# Patient Record
Sex: FEMALE | Race: WHITE | HISPANIC OR LATINO | Employment: FULL TIME | URBAN - METROPOLITAN AREA
[De-identification: names, ages, dates, MRNs, and addresses within clinical notes are randomized per-mention and may not be internally consistent; named-entity substitution may affect disease eponyms.]

---

## 2020-07-16 ENCOUNTER — ANNUAL EXAM (OUTPATIENT)
Dept: OBGYN CLINIC | Facility: CLINIC | Age: 50
End: 2020-07-16
Payer: COMMERCIAL

## 2020-07-16 VITALS
TEMPERATURE: 97.5 F | WEIGHT: 95 LBS | SYSTOLIC BLOOD PRESSURE: 98 MMHG | DIASTOLIC BLOOD PRESSURE: 58 MMHG | HEIGHT: 61 IN | BODY MASS INDEX: 17.94 KG/M2

## 2020-07-16 DIAGNOSIS — Z01.419 ROUTINE GYNECOLOGICAL EXAMINATION: Primary | ICD-10-CM

## 2020-07-16 DIAGNOSIS — Z12.11 ENCOUNTER FOR SCREENING FOR MALIGNANT NEOPLASM OF COLON: ICD-10-CM

## 2020-07-16 DIAGNOSIS — Z11.51 SCREENING FOR HUMAN PAPILLOMAVIRUS: ICD-10-CM

## 2020-07-16 DIAGNOSIS — Z12.4 SCREENING FOR MALIGNANT NEOPLASM OF THE CERVIX: ICD-10-CM

## 2020-07-16 DIAGNOSIS — Z12.31 ENCOUNTER FOR SCREENING MAMMOGRAM FOR MALIGNANT NEOPLASM OF BREAST: ICD-10-CM

## 2020-07-16 PROCEDURE — 87624 HPV HI-RISK TYP POOLED RSLT: CPT | Performed by: OBSTETRICS & GYNECOLOGY

## 2020-07-16 PROCEDURE — G0145 SCR C/V CYTO,THINLAYER,RESCR: HCPCS | Performed by: OBSTETRICS & GYNECOLOGY

## 2020-07-16 PROCEDURE — 3008F BODY MASS INDEX DOCD: CPT | Performed by: OBSTETRICS & GYNECOLOGY

## 2020-07-16 PROCEDURE — S0610 ANNUAL GYNECOLOGICAL EXAMINA: HCPCS | Performed by: OBSTETRICS & GYNECOLOGY

## 2020-07-16 NOTE — PROGRESS NOTES
Wally Laughlin is a 48 y o  female who presents for annual well woman exam    Patient has no complain      Menstrual History:  OB History        4    Para   4    Term   4            AB        Living   4       SAB        TAB        Ectopic        Multiple        Live Births                      Patient's last menstrual period was 2020 (lmp unknown)  The following portions of the patient's history were reviewed and updated as appropriate: allergies, current medications, past family history, past medical history, past social history, past surgical history and problem list     Review of Systems  Review of Systems   Constitutional: Negative for activity change, appetite change, chills, fatigue and fever  Respiratory: Negative for cough and shortness of breath  Cardiovascular: Negative for chest pain, palpitations and leg swelling  Gastrointestinal: Negative for abdominal pain, constipation, diarrhea, nausea and vomiting  Genitourinary: Negative for difficulty urinating, dysuria, flank pain, frequency, hematuria, urgency and vaginal discharge  Neurological: Negative for dizziness and headaches  Psychiatric/Behavioral: Negative for confusion            Objective      BP 98/58   Temp 97 5 °F (36 4 °C) (Tympanic)   Ht 5' 1" (1 549 m)   Wt 43 1 kg (95 lb)   LMP 2020 (LMP Unknown)   BMI 17 95 kg/m²     Physical Exam  OBGyn Exam     General:   alert and oriented, in no acute distress, alert, appears stated age and cooperative   Heart: regular rate and rhythm, S1, S2 normal, no murmur, click, rub or gallop   Lungs: clear to auscultation bilaterally   Abdomen: soft, non-tender, without masses or organomegaly   Vulva: normal   Vagina: normal mucosa, normal discharge   Cervix: no bleeding following Pap, no cervical motion tenderness and no lesions   Uterus: normal size, mobile, non-tender   Adnexa:  Breast Exam:  normal adnexa and no mass, fullness, tenderness  breasts appear normal, no suspicious masses, no skin or nipple changes or axillary nodes  Assessment      @well woman@   49 yo female   Annual exam   Prior 4 C/S    Plan   Pap/hpv  Diet/exercise  Ca/vit D  mammo  Colonoscopy  Return to office for annual exam   All questions answered  There are no Patient Instructions on file for this visit

## 2020-07-17 ENCOUNTER — HOSPITAL ENCOUNTER (OUTPATIENT)
Dept: MAMMOGRAPHY | Facility: HOSPITAL | Age: 50
Discharge: HOME | End: 2020-07-17
Attending: OBSTETRICS & GYNECOLOGY
Payer: COMMERCIAL

## 2020-07-17 VITALS — WEIGHT: 95 LBS | HEIGHT: 61 IN | BODY MASS INDEX: 17.94 KG/M2

## 2020-07-17 DIAGNOSIS — Z12.31 ENCOUNTER FOR SCREENING MAMMOGRAM FOR MALIGNANT NEOPLASM OF BREAST: ICD-10-CM

## 2020-07-17 PROCEDURE — 77067 SCR MAMMO BI INCL CAD: CPT

## 2020-07-17 PROCEDURE — 77063 BREAST TOMOSYNTHESIS BI: CPT

## 2020-07-19 LAB
HPV HR 12 DNA CVX QL NAA+PROBE: NEGATIVE
HPV16 DNA CVX QL NAA+PROBE: NEGATIVE
HPV18 DNA CVX QL NAA+PROBE: NEGATIVE

## 2020-07-23 ENCOUNTER — OFFICE VISIT (OUTPATIENT)
Dept: SURGERY | Facility: CLINIC | Age: 50
End: 2020-07-23
Payer: COMMERCIAL

## 2020-07-23 ENCOUNTER — TELEPHONE (OUTPATIENT)
Dept: SURGERY | Facility: CLINIC | Age: 50
End: 2020-07-23

## 2020-07-23 VITALS
WEIGHT: 94 LBS | HEART RATE: 77 BPM | SYSTOLIC BLOOD PRESSURE: 100 MMHG | BODY MASS INDEX: 17.76 KG/M2 | TEMPERATURE: 97 F | DIASTOLIC BLOOD PRESSURE: 62 MMHG

## 2020-07-23 DIAGNOSIS — Z12.11 SCREENING FOR MALIGNANT NEOPLASM OF COLON: Primary | ICD-10-CM

## 2020-07-23 PROCEDURE — 99242 OFF/OP CONSLTJ NEW/EST SF 20: CPT | Performed by: SURGERY

## 2020-07-23 NOTE — PROGRESS NOTES
Assessment/Plan:  The patient is due for a screening colonoscopy  She will be scheduled for the same  She wants this to be done at the [facility]  I gave her prescription for GoLYTELY  I explained the procedure to her and she verbalizes understanding  Diagnoses and all orders for this visit:    Screening for malignant neoplasm of colon          Subjective:      Patient ID: Ishmael Norton is a 48 y o  female  54-year-old female patient came to my office because she is due for a screening colonoscopy  She has no complaints of abdominal pain, constipation, change of bowel habits  No complaints of blood in stools or melenic stools  She has no significant history of colon cancer in her family  The following portions of the patient's history were reviewed and updated as appropriate: allergies, current medications, past medical history, past social history, past surgical history and problem list     Review of Systems   Constitutional: Negative  HENT: Negative  Eyes: Negative  Respiratory: Negative  Cardiovascular: Negative  Gastrointestinal: Negative  Endocrine: Negative  Genitourinary: Negative  Musculoskeletal: Negative  Skin: Negative  Allergic/Immunologic: Negative  Neurological: Negative  Hematological: Negative  Psychiatric/Behavioral: Negative  Objective:      /62 (BP Location: Left arm, Patient Position: Sitting, Cuff Size: Adult)   Pulse 77   Temp (!) 97 °F (36 1 °C) (Tympanic)   Wt 42 6 kg (94 lb)   LMP 07/16/2020 (LMP Unknown) Comment: irregular  BMI 17 76 kg/m²          Physical Exam   Constitutional: She is oriented to person, place, and time  She appears well-developed and well-nourished  HENT:   Head: Normocephalic and atraumatic  Eyes: Pupils are equal, round, and reactive to light  Conjunctivae are normal    Neck: Normal range of motion  Neck supple     Cardiovascular: Normal rate, regular rhythm and normal heart sounds  Pulmonary/Chest: Effort normal and breath sounds normal    Abdominal: Soft  Bowel sounds are normal    Neurological: She is alert and oriented to person, place, and time  Skin: Skin is warm  Psychiatric: She has a normal mood and affect   Her behavior is normal  Judgment and thought content normal

## 2020-07-24 ENCOUNTER — OFFICE VISIT (OUTPATIENT)
Dept: FAMILY MEDICINE CLINIC | Facility: CLINIC | Age: 50
End: 2020-07-24
Payer: COMMERCIAL

## 2020-07-24 VITALS
TEMPERATURE: 97.9 F | BODY MASS INDEX: 18.24 KG/M2 | HEIGHT: 61 IN | OXYGEN SATURATION: 100 % | SYSTOLIC BLOOD PRESSURE: 90 MMHG | RESPIRATION RATE: 16 BRPM | DIASTOLIC BLOOD PRESSURE: 60 MMHG | HEART RATE: 78 BPM | WEIGHT: 96.6 LBS

## 2020-07-24 DIAGNOSIS — Z00.00 ANNUAL PHYSICAL EXAM: Primary | ICD-10-CM

## 2020-07-24 LAB
LAB AP GYN PRIMARY INTERPRETATION: NORMAL
LAB AP LMP: NORMAL
Lab: NORMAL

## 2020-07-24 PROCEDURE — 99396 PREV VISIT EST AGE 40-64: CPT | Performed by: FAMILY MEDICINE

## 2020-07-24 PROCEDURE — 3008F BODY MASS INDEX DOCD: CPT | Performed by: FAMILY MEDICINE

## 2020-07-24 NOTE — PROGRESS NOTES
Assessment/Plan:         Problem List Items Addressed This Visit        Other    Annual physical exam - Primary     Check labs had mammo gyn exam and scheduled for colonosocpy         Relevant Orders    CBC and differential    Comprehensive metabolic panel    Lipid panel    Urinalysis with microscopic            Subjective:  Pt here or physical   Patient ID: Joselin Arthur is a 48 y o  female  HPI    The following portions of the patient's history were reviewed and updated as appropriate:   She has a past medical history of Abnormal Pap smear of cervix, Anxiety and depression, Breast lump, and Migraine  ,  does not have any pertinent problems on file  ,   has a past surgical history that includes Breast lumpectomy (Right);  section; Breast excisional biopsy (Right, ); Mammo (historical) (2018); Dilation and curettage, diagnostic / therapeutic (); Multiple tooth extractions; and Tubal ligation ()  ,  family history includes Aneurysm in her mother; Hypertension in her mother; No Known Problems in her brother, daughter, sister, son, son, and son; Stroke in her brother  ,   reports that she has been smoking cigarettes  She has a 33 00 pack-year smoking history  She has never used smokeless tobacco  She reports that she drank alcohol  She reports that she does not use drugs  ,  has No Known Allergies     Current Outpatient Medications   Medication Sig Dispense Refill    ibuprofen (MOTRIN) 600 mg tablet Take by mouth every 6 (six) hours as needed for mild pain      polyethylene glycol (Golytely) 4000 mL solution Take 4,000 mL by mouth once for 1 dose 4000 mL 0     No current facility-administered medications for this visit  Review of Systems   Constitutional: Negative for appetite change, chills, fatigue and fever  Respiratory: Negative for cough, chest tightness and shortness of breath  Cardiovascular: Negative for chest pain, palpitations and leg swelling     Gastrointestinal: Negative for abdominal pain, constipation, diarrhea, nausea and vomiting  Genitourinary: Negative for difficulty urinating and frequency  Musculoskeletal: Negative for arthralgias, back pain and neck pain  Skin: Negative for rash  Neurological: Negative for dizziness, weakness, light-headedness, numbness and headaches  Hematological: Does not bruise/bleed easily  Psychiatric/Behavioral: Negative for dysphoric mood and sleep disturbance  The patient is not nervous/anxious  Objective:  Vitals:    07/24/20 0854   BP: 90/60   BP Location: Right arm   Patient Position: Sitting   Cuff Size: Adult   Pulse: 78   Resp: 16   Temp: 97 9 °F (36 6 °C)   SpO2: 100%   Weight: 43 8 kg (96 lb 9 6 oz)   Height: 5' 1" (1 549 m)     Body mass index is 18 25 kg/m²  Physical Exam   Constitutional: She is oriented to person, place, and time  She appears well-developed  No distress  HENT:   Head: Normocephalic  Right Ear: Tympanic membrane, external ear and ear canal normal    Left Ear: Tympanic membrane, external ear and ear canal normal    Nose: Nose normal    Mouth/Throat: Oropharynx is clear and moist  No oropharyngeal exudate  Eyes: Pupils are equal, round, and reactive to light  Conjunctivae, EOM and lids are normal    Neck: Normal range of motion  Neck supple  Carotid bruit is not present  No thyromegaly present  Cardiovascular: Normal rate, regular rhythm, normal heart sounds, intact distal pulses and normal pulses  Exam reveals no friction rub  No murmur heard  Pulmonary/Chest: Effort normal and breath sounds normal  No stridor  No respiratory distress  She has no wheezes  She has no rhonchi  She has no rales  Abdominal: Soft  Normal appearance and bowel sounds are normal  She exhibits no distension and no mass  There is no hepatosplenomegaly  There is no tenderness  There is no guarding  No hernia  Musculoskeletal: Normal range of motion  She exhibits no edema     Lymphadenopathy:     She has no cervical adenopathy  She has no axillary adenopathy  Right: No inguinal adenopathy present  Left: No inguinal adenopathy present  Neurological: She is alert and oriented to person, place, and time  She has normal strength  She displays no tremor and normal reflexes  No cranial nerve deficit or sensory deficit  She exhibits normal muscle tone  Coordination and gait normal    Skin: Skin is warm, dry and intact  No abnormal appearing moles   Psychiatric: She has a normal mood and affect  Her speech is normal and behavior is normal  Judgment and thought content normal    Vitals reviewed

## 2020-07-25 ENCOUNTER — APPOINTMENT (OUTPATIENT)
Dept: LAB | Facility: HOSPITAL | Age: 50
End: 2020-07-25
Payer: COMMERCIAL

## 2020-07-25 DIAGNOSIS — Z00.00 ANNUAL PHYSICAL EXAM: ICD-10-CM

## 2020-07-25 LAB
ALBUMIN SERPL BCP-MCNC: 3.8 G/DL (ref 3.5–5)
ALP SERPL-CCNC: 61 U/L (ref 46–116)
ALT SERPL W P-5'-P-CCNC: 19 U/L (ref 12–78)
ANION GAP SERPL CALCULATED.3IONS-SCNC: 4 MMOL/L (ref 4–13)
AST SERPL W P-5'-P-CCNC: 11 U/L (ref 5–45)
BACTERIA UR QL AUTO: ABNORMAL /HPF
BASOPHILS # BLD AUTO: 0.03 THOUSANDS/ΜL (ref 0–0.1)
BASOPHILS NFR BLD AUTO: 0 % (ref 0–1)
BILIRUB SERPL-MCNC: 0.67 MG/DL (ref 0.2–1)
BILIRUB UR QL STRIP: NEGATIVE
BUN SERPL-MCNC: 11 MG/DL (ref 5–25)
CALCIUM SERPL-MCNC: 9 MG/DL (ref 8.3–10.1)
CHLORIDE SERPL-SCNC: 108 MMOL/L (ref 100–108)
CHOLEST SERPL-MCNC: 207 MG/DL (ref 50–200)
CLARITY UR: ABNORMAL
CO2 SERPL-SCNC: 28 MMOL/L (ref 21–32)
COLOR UR: YELLOW
CREAT SERPL-MCNC: 0.72 MG/DL (ref 0.6–1.3)
EOSINOPHIL # BLD AUTO: 0.06 THOUSAND/ΜL (ref 0–0.61)
EOSINOPHIL NFR BLD AUTO: 1 % (ref 0–6)
ERYTHROCYTE [DISTWIDTH] IN BLOOD BY AUTOMATED COUNT: 14.6 % (ref 11.6–15.1)
GFR SERPL CREATININE-BSD FRML MDRD: 98 ML/MIN/1.73SQ M
GLUCOSE P FAST SERPL-MCNC: 83 MG/DL (ref 65–99)
GLUCOSE UR STRIP-MCNC: NEGATIVE MG/DL
HCT VFR BLD AUTO: 38.2 % (ref 34.8–46.1)
HDLC SERPL-MCNC: 55 MG/DL
HGB BLD-MCNC: 12.1 G/DL (ref 11.5–15.4)
HGB UR QL STRIP.AUTO: NEGATIVE
HYALINE CASTS #/AREA URNS LPF: ABNORMAL /LPF
IMM GRANULOCYTES # BLD AUTO: 0.03 THOUSAND/UL (ref 0–0.2)
IMM GRANULOCYTES NFR BLD AUTO: 0 % (ref 0–2)
KETONES UR STRIP-MCNC: NEGATIVE MG/DL
LDLC SERPL CALC-MCNC: 131 MG/DL (ref 0–100)
LEUKOCYTE ESTERASE UR QL STRIP: NEGATIVE
LYMPHOCYTES # BLD AUTO: 2.26 THOUSANDS/ΜL (ref 0.6–4.47)
LYMPHOCYTES NFR BLD AUTO: 31 % (ref 14–44)
MCH RBC QN AUTO: 29.2 PG (ref 26.8–34.3)
MCHC RBC AUTO-ENTMCNC: 31.7 G/DL (ref 31.4–37.4)
MCV RBC AUTO: 92 FL (ref 82–98)
MONOCYTES # BLD AUTO: 0.44 THOUSAND/ΜL (ref 0.17–1.22)
MONOCYTES NFR BLD AUTO: 6 % (ref 4–12)
NEUTROPHILS # BLD AUTO: 4.53 THOUSANDS/ΜL (ref 1.85–7.62)
NEUTS SEG NFR BLD AUTO: 62 % (ref 43–75)
NITRITE UR QL STRIP: POSITIVE
NON-SQ EPI CELLS URNS QL MICRO: ABNORMAL /HPF
NONHDLC SERPL-MCNC: 152 MG/DL
NRBC BLD AUTO-RTO: 0 /100 WBCS
PH UR STRIP.AUTO: 8 [PH]
PLATELET # BLD AUTO: 296 THOUSANDS/UL (ref 149–390)
PMV BLD AUTO: 9.6 FL (ref 8.9–12.7)
POTASSIUM SERPL-SCNC: 4.7 MMOL/L (ref 3.5–5.3)
PROT SERPL-MCNC: 7.4 G/DL (ref 6.4–8.2)
PROT UR STRIP-MCNC: NEGATIVE MG/DL
RBC # BLD AUTO: 4.14 MILLION/UL (ref 3.81–5.12)
RBC #/AREA URNS AUTO: ABNORMAL /HPF
SODIUM SERPL-SCNC: 140 MMOL/L (ref 136–145)
SP GR UR STRIP.AUTO: 1.01 (ref 1–1.03)
TRIGL SERPL-MCNC: 105 MG/DL
UROBILINOGEN UR QL STRIP.AUTO: 0.2 E.U./DL
WBC # BLD AUTO: 7.35 THOUSAND/UL (ref 4.31–10.16)
WBC #/AREA URNS AUTO: ABNORMAL /HPF

## 2020-07-25 PROCEDURE — 85025 COMPLETE CBC W/AUTO DIFF WBC: CPT

## 2020-07-25 PROCEDURE — 36415 COLL VENOUS BLD VENIPUNCTURE: CPT

## 2020-07-25 PROCEDURE — 80053 COMPREHEN METABOLIC PANEL: CPT

## 2020-07-25 PROCEDURE — 80061 LIPID PANEL: CPT

## 2020-07-25 PROCEDURE — 81001 URINALYSIS AUTO W/SCOPE: CPT | Performed by: FAMILY MEDICINE

## 2020-07-27 ENCOUNTER — PREP FOR PROCEDURE (OUTPATIENT)
Dept: SURGERY | Facility: CLINIC | Age: 50
End: 2020-07-27

## 2020-07-27 DIAGNOSIS — Z12.11 SCREENING FOR MALIGNANT NEOPLASM OF COLON: Primary | ICD-10-CM

## 2020-07-29 ENCOUNTER — TELEPHONE (OUTPATIENT)
Dept: FAMILY MEDICINE CLINIC | Facility: CLINIC | Age: 50
End: 2020-07-29

## 2020-07-29 DIAGNOSIS — E78.5 HYPERLIPIDEMIA, UNSPECIFIED HYPERLIPIDEMIA TYPE: Primary | ICD-10-CM

## 2020-08-04 ENCOUNTER — HOSPITAL ENCOUNTER (OUTPATIENT)
Dept: GASTROENTEROLOGY | Facility: HOSPITAL | Age: 50
Setting detail: OUTPATIENT SURGERY
Discharge: HOME | End: 2020-08-04
Attending: SURGERY | Admitting: SURGERY
Payer: COMMERCIAL

## 2020-08-04 ENCOUNTER — ANESTHESIA (OUTPATIENT)
Dept: GASTROENTEROLOGY | Facility: HOSPITAL | Age: 50
End: 2020-08-04

## 2020-08-04 ENCOUNTER — ANESTHESIA EVENT (OUTPATIENT)
Dept: GASTROENTEROLOGY | Facility: HOSPITAL | Age: 50
End: 2020-08-04

## 2020-08-04 VITALS
HEIGHT: 61 IN | RESPIRATION RATE: 16 BRPM | SYSTOLIC BLOOD PRESSURE: 114 MMHG | OXYGEN SATURATION: 100 % | TEMPERATURE: 97.1 F | HEART RATE: 74 BPM | DIASTOLIC BLOOD PRESSURE: 68 MMHG | BODY MASS INDEX: 18.12 KG/M2 | WEIGHT: 96 LBS

## 2020-08-04 DIAGNOSIS — Z12.11 SCREENING FOR MALIGNANT NEOPLASM OF COLON: ICD-10-CM

## 2020-08-04 PROBLEM — IMO0001 SMOKING: Status: ACTIVE | Noted: 2020-08-04

## 2020-08-04 PROBLEM — F41.9 ANXIETY: Status: ACTIVE | Noted: 2020-08-04

## 2020-08-04 PROBLEM — F32.A DEPRESSION: Status: ACTIVE | Noted: 2020-08-04

## 2020-08-04 PROBLEM — F17.200 SMOKING: Status: ACTIVE | Noted: 2020-08-04

## 2020-08-04 PROCEDURE — 45380 COLONOSCOPY AND BIOPSY: CPT | Performed by: SURGERY

## 2020-08-04 PROCEDURE — 88305 TISSUE EXAM BY PATHOLOGIST: CPT | Performed by: PATHOLOGY

## 2020-08-04 RX ORDER — PROPOFOL 10 MG/ML
INJECTION, EMULSION INTRAVENOUS AS NEEDED
Status: DISCONTINUED | OUTPATIENT
Start: 2020-08-04 | End: 2020-08-04

## 2020-08-04 RX ORDER — PROPOFOL 10 MG/ML
INJECTION, EMULSION INTRAVENOUS CONTINUOUS PRN
Status: DISCONTINUED | OUTPATIENT
Start: 2020-08-04 | End: 2020-08-04

## 2020-08-04 RX ORDER — ACETAMINOPHEN 325 MG/1
650 TABLET ORAL ONCE AS NEEDED
Status: COMPLETED | OUTPATIENT
Start: 2020-08-04 | End: 2020-08-04

## 2020-08-04 RX ORDER — LIDOCAINE HYDROCHLORIDE 10 MG/ML
INJECTION, SOLUTION EPIDURAL; INFILTRATION; INTRACAUDAL; PERINEURAL AS NEEDED
Status: DISCONTINUED | OUTPATIENT
Start: 2020-08-04 | End: 2020-08-04

## 2020-08-04 RX ORDER — SODIUM CHLORIDE 9 MG/ML
INJECTION, SOLUTION INTRAVENOUS CONTINUOUS PRN
Status: DISCONTINUED | OUTPATIENT
Start: 2020-08-04 | End: 2020-08-04

## 2020-08-04 RX ADMIN — PROPOFOL 140 MCG/KG/MIN: 10 INJECTION, EMULSION INTRAVENOUS at 09:29

## 2020-08-04 RX ADMIN — PROPOFOL 30 MG: 10 INJECTION, EMULSION INTRAVENOUS at 09:36

## 2020-08-04 RX ADMIN — PROPOFOL 50 MG: 10 INJECTION, EMULSION INTRAVENOUS at 09:29

## 2020-08-04 RX ADMIN — LIDOCAINE HYDROCHLORIDE 50 MG: 10 INJECTION, SOLUTION EPIDURAL; INFILTRATION; INTRACAUDAL; PERINEURAL at 09:29

## 2020-08-04 RX ADMIN — ACETAMINOPHEN 650 MG: 325 TABLET ORAL at 10:43

## 2020-08-04 RX ADMIN — SODIUM CHLORIDE: 9 INJECTION, SOLUTION INTRAVENOUS at 09:25

## 2020-08-04 RX ADMIN — PROPOFOL 20 MG: 10 INJECTION, EMULSION INTRAVENOUS at 09:31

## 2020-08-04 NOTE — ANESTHESIA PREPROCEDURE EVALUATION
Procedure:  COLONOSCOPY    Relevant Problems   NEURO/PSYCH   (+) Anxiety   (+) Depression      PULMONARY   (+) Smoking        Physical Exam    Airway    Mallampati score: II  TM Distance: >3 FB  Neck ROM: full     Dental   lower dentures and upper dentures,     Cardiovascular  Rhythm: regular, Rate: normal, Cardiovascular exam normal    Pulmonary  Pulmonary exam normal     Other Findings        Anesthesia Plan  ASA Score- 2     Anesthesia Type- IV sedation with anesthesia with ASA Monitors  Additional Monitors:   Airway Plan:           Plan Factors-Exercise tolerance (METS): >4 METS  Chart reviewed  Patient summary reviewed  Patient is a current smoker  Patient smoked on day of surgery  Induction- intravenous  Postoperative Plan-     Informed Consent- Anesthetic plan and risks discussed with patient  I personally reviewed this patient with the CRNA  Discussed and agreed on the Anesthesia Plan with the CRNA  Ludy Guerrero

## 2020-08-04 NOTE — ANESTHESIA POSTPROCEDURE EVALUATION
Post-Op Assessment Note    CV Status:  Stable  Pain Score: 0    Pain management: adequate     Mental Status:  Alert, sleepy and arousable   Hydration Status:  Euvolemic and stable   PONV Controlled:  Controlled   Airway Patency:  Patent      Post Op Vitals Reviewed: Yes      Staff: Anesthesiologist, CRNA         No complications documented      /61 (08/04/20 1011)    Temp     Pulse 78 (08/04/20 1011)   Resp 20 (08/04/20 1011)    SpO2 100 % (08/04/20 1011)

## 2020-08-11 ENCOUNTER — TELEPHONE (OUTPATIENT)
Dept: MAMMOGRAPHY | Facility: CLINIC | Age: 50
End: 2020-08-11

## 2020-08-17 ENCOUNTER — TELEPHONE (OUTPATIENT)
Dept: SURGERY | Facility: CLINIC | Age: 50
End: 2020-08-17

## 2020-08-17 NOTE — TELEPHONE ENCOUNTER
Left a message for the patient to call back   Dr Mercedez Munguia said he removed a polyp during the colonoscopy and that she will need to repeat a colonoscopy in 2 years

## 2020-08-20 ENCOUNTER — HOSPITAL ENCOUNTER (OUTPATIENT)
Dept: MAMMOGRAPHY | Facility: CLINIC | Age: 50
Discharge: HOME | End: 2020-08-20
Payer: COMMERCIAL

## 2020-08-20 VITALS — HEIGHT: 61 IN | WEIGHT: 96 LBS | BODY MASS INDEX: 18.12 KG/M2 | TEMPERATURE: 97.1 F

## 2020-08-20 DIAGNOSIS — R92.8 ABNORMAL MAMMOGRAM: ICD-10-CM

## 2020-08-20 PROCEDURE — 77065 DX MAMMO INCL CAD UNI: CPT

## 2020-08-21 NOTE — RESULT ENCOUNTER NOTE
Please notify patient  Dense breast with calcification   recommend patient to be seen by breast surgeon for evlalution as well Declined

## 2020-08-27 ENCOUNTER — TELEPHONE (OUTPATIENT)
Dept: MAMMOGRAPHY | Facility: CLINIC | Age: 50
End: 2020-08-27

## 2020-10-13 DIAGNOSIS — R92.8 ABNORMAL FINDING ON MAMMOGRAPHY: Primary | ICD-10-CM

## 2020-10-27 ENCOUNTER — TELEPHONE (OUTPATIENT)
Dept: OBGYN CLINIC | Facility: CLINIC | Age: 50
End: 2020-10-27

## 2020-10-28 DIAGNOSIS — N39.0 URINARY TRACT INFECTION WITHOUT HEMATURIA, SITE UNSPECIFIED: Primary | ICD-10-CM

## 2020-10-28 RX ORDER — NITROFURANTOIN 25; 75 MG/1; MG/1
100 CAPSULE ORAL 2 TIMES DAILY
Qty: 14 CAPSULE | Refills: 0 | Status: SHIPPED | OUTPATIENT
Start: 2020-10-28 | End: 2020-11-04

## 2021-03-18 ENCOUNTER — HOSPITAL ENCOUNTER (OUTPATIENT)
Dept: MAMMOGRAPHY | Facility: CLINIC | Age: 51
Discharge: HOME | End: 2021-03-18
Payer: COMMERCIAL

## 2021-03-18 VITALS — BODY MASS INDEX: 18.12 KG/M2 | WEIGHT: 96 LBS | HEIGHT: 61 IN

## 2021-03-18 DIAGNOSIS — R92.1 BREAST CALCIFICATIONS: ICD-10-CM

## 2021-03-18 DIAGNOSIS — R92.8 ABNORMAL FINDING ON MAMMOGRAPHY: ICD-10-CM

## 2021-03-18 PROCEDURE — 77065 DX MAMMO INCL CAD UNI: CPT

## 2021-03-24 DIAGNOSIS — R92.8 ABNORMAL MAMMOGRAM: Primary | ICD-10-CM

## 2021-03-31 ENCOUNTER — OFFICE VISIT (OUTPATIENT)
Dept: INTERNAL MEDICINE CLINIC | Facility: CLINIC | Age: 51
End: 2021-03-31

## 2021-03-31 VITALS
HEIGHT: 62 IN | HEART RATE: 83 BPM | DIASTOLIC BLOOD PRESSURE: 68 MMHG | SYSTOLIC BLOOD PRESSURE: 115 MMHG | TEMPERATURE: 97.3 F | WEIGHT: 93.8 LBS | BODY MASS INDEX: 17.26 KG/M2 | OXYGEN SATURATION: 96 %

## 2021-03-31 DIAGNOSIS — Z12.2 ENCOUNTER FOR SCREENING FOR LUNG CANCER: ICD-10-CM

## 2021-03-31 DIAGNOSIS — F17.219 CIGARETTE NICOTINE DEPENDENCE WITH NICOTINE-INDUCED DISORDER: ICD-10-CM

## 2021-03-31 DIAGNOSIS — Z13.31 DEPRESSION SCREENING: Primary | ICD-10-CM

## 2021-03-31 DIAGNOSIS — N39.0 URINARY TRACT INFECTION WITHOUT HEMATURIA, SITE UNSPECIFIED: ICD-10-CM

## 2021-03-31 DIAGNOSIS — Z00.00 ENCOUNTER FOR MEDICAL EXAMINATION TO ESTABLISH CARE: ICD-10-CM

## 2021-03-31 DIAGNOSIS — R63.4 WEIGHT LOSS: ICD-10-CM

## 2021-03-31 DIAGNOSIS — R92.8 ABNORMAL MAMMOGRAM OF RIGHT BREAST: ICD-10-CM

## 2021-03-31 DIAGNOSIS — Z00.00 HEALTHCARE MAINTENANCE: ICD-10-CM

## 2021-03-31 DIAGNOSIS — Z11.4 SCREENING FOR HIV (HUMAN IMMUNODEFICIENCY VIRUS): ICD-10-CM

## 2021-03-31 PROBLEM — D12.6 TUBULAR ADENOMA OF COLON: Status: ACTIVE | Noted: 2021-03-31

## 2021-03-31 PROCEDURE — 99244 OFF/OP CNSLTJ NEW/EST MOD 40: CPT | Performed by: INTERNAL MEDICINE

## 2021-03-31 PROCEDURE — 3725F SCREEN DEPRESSION PERFORMED: CPT | Performed by: INTERNAL MEDICINE

## 2021-03-31 PROCEDURE — 3008F BODY MASS INDEX DOCD: CPT | Performed by: INTERNAL MEDICINE

## 2021-03-31 RX ORDER — POLYETHYLENE GLYCOL 3350 17 G
2 POWDER IN PACKET (EA) ORAL AS NEEDED
Qty: 100 EACH | Refills: 1 | Status: SHIPPED | OUTPATIENT
Start: 2021-03-31 | End: 2021-04-16

## 2021-03-31 RX ORDER — SULFAMETHOXAZOLE AND TRIMETHOPRIM 800; 160 MG/1; MG/1
1 TABLET ORAL 2 TIMES DAILY
Qty: 6 TABLET | Refills: 0 | Status: SHIPPED | OUTPATIENT
Start: 2021-03-31 | End: 2021-04-03

## 2021-03-31 RX ORDER — BUPROPION HYDROCHLORIDE 150 MG/1
TABLET ORAL
Qty: 30 TABLET | Refills: 5 | Status: SHIPPED | OUTPATIENT
Start: 2021-03-31 | End: 2021-04-16

## 2021-03-31 NOTE — PROGRESS NOTES
INTERNAL MEDICINE OFFICE VISIT  [facility]  [address] [facility]    NAME: Cass Fulton  AGE: 46 y o  SEX: female    DATE OF ENCOUNTER: 3/31/2021    Assessment and Plan     1  Depression screening  PHQ-9 today is 7 ; patient denies any suicidal thoughts/plans, notes feeling good overall and decline need for antidepressant medication / psych therapy which was offered and explained to her  To follow up and readdress next visit  2  Encounter for medical examination to establish care  Physical exam completed - see below  3  Healthcare maintenance  Colonoscopy done 08/04/2020 remarkable for Tubular Adenoma Sigmoid Polyp, due in 5-10 years  HPV and PAP smear 07/16/2020 normal   Received information on COVID vaccine signup     4  Screening for HIV (human immunodeficiency virus)  - HIV 1/2 Antigen/Antibody (4th Generation) w Reflex [facility]; Future    5  Encounter for screening for lung cancer  Patient has unintentional weight loss about 16 lbs in past two years and reports two months night sweating; menopause symptoms are possible given noted irregular menses but need to rule out lung cancer given smoking hx, weight loss and night sweats    - CT lung low dose wo contrast ordered; patient to call and schedule     6  Urinary tract infection without hematuria, site unspecified  2 weeks foul smelling urine and right flank discomfort / mild pain noted; positive hx of UTIs  - sulfamethoxazole-trimethoprim (BACTRIM DS) 800-160 mg per tablet; Take 1 tablet by mouth 2 (two) times a day for 3 days  Dispense: 6 tablet; Refill: 0  - Patient to call and schedule earlier f/u appointment if no improvement     7  Abnormal mammogram of right breast  Right lower breast calcifications noted on recent mammogram 03/18/2021   - Mammogram ordered and to be scheduled in September 2021     8   Cigarette nicotine dependence with nicotine-induced disorder  33 pack year hx of smoking; current 1 pack / day, willing to smoke , notes trying Chantix in the past with side effect of nausea and vomiting; and tried Nicotine patches & gums before as well on another time trying to quit but was unsuccessful      - buPROPion (WELLBUTRIN XL) 150 mg 24 hr tablet; Take one tablet by mouth daily for the first three days followed by twice a day afterward  Dispense: 30 tablet; Refill: 5  - nicotine polacrilex (COMMIT) 2 MG lozenge; Apply 1 lozenge (2 mg total) to the mouth or throat as needed for smoking cessation  Dispense: 100 each; Refill: 1    9  Weight loss  Unintentional weight loss 16 lbs in 2 years, no change in diet or exercise, up to date for cervical cancer and breast cancer screening; had colorectal cancer screening 08/04/2020, due in 5-10 years  - CT lung low dose ordered   - TSH, 3rd generation with Free T4 reflex; Future  - HIV 1/2 Antigen/Antibody (4th Generation) w Reflex [facility]; Future    Orders Placed This Encounter   Procedures    HIV 1/2 Antigen/Antibody (4th Generation) w Reflex [facility]    TSH, 3rd generation with Free T4 reflex       Chief Complaint     Chief Complaint   Patient presents with   [facility] Patient Visit     Establish care       History of Present Illness     HPI   Kaley Matamoros is a 45 yo female w PMH remarkable for Nicotine dependence, Migraine, Breast lump s/p lumpectomy 2014, Anxiety and Depression, hx abnormal pap smear who presents today to establish care mainly and with concerns of unintended weight loss and foul smelling urine       Patient notes unintentional weight loss, no major change in diet or exercise, lost about 16 lbs in 2 years, noting poor dentition for which she went to the dentist and working on having dental fillings / treatments but also noting "eating well" as usual  The concern of need to rule out cancer and work up for the etiology and management of her unintentional weight loss explained to the patient as above, including CT lung and repeating mammogram in 6 months  Patient express interest in quitting smoking, failed quitting trials in the past, the above assessment and plan discussed with her in details and to follow up in 5 weeks for further assessment and management  Patient notes 2 weeks of foul smelling urine and right flank discomfort with positive hx of UTI past year; agree for empirical treatment with Bactrim and to call and update if no improvement for further management  The following portions of the patient's history were reviewed and updated as appropriate: allergies, current medications, past family history, past medical history, past social history, past surgical history and problem list     Review of Systems     Review of Systems   Constitutional: Positive for diaphoresis and unexpected weight change  Negative for appetite change, chills and fatigue  HENT: Positive for dental problem  Negative for mouth sores, rhinorrhea, sore throat and trouble swallowing  Eyes: Negative for visual disturbance  Wear prescription glasses    Respiratory: Negative for cough, chest tightness, shortness of breath and wheezing  Cardiovascular: Negative for chest pain, palpitations and leg swelling  Gastrointestinal: Negative for abdominal pain, blood in stool, diarrhea and nausea  Endocrine: Negative for cold intolerance, heat intolerance, polydipsia, polyphagia and polyuria  Genitourinary: Positive for dysuria and flank pain  Negative for difficulty urinating, dyspareunia and hematuria  Neurological: Positive for headaches  Negative for dizziness, seizures, weakness and numbness  Hx of migraine   Psychiatric/Behavioral: Negative for behavioral problems, dysphoric mood, hallucinations, self-injury and suicidal ideas  The patient is not nervous/anxious           Depression hx ; but denies suicidal / ideation thoughts        Active Problem List     Patient Active Problem List   Diagnosis    Annual physical exam    Smoking    Anxiety  Depression    Tubular adenoma of colon       Objective     /68 (BP Location: Left arm, Patient Position: Sitting, Cuff Size: Standard)   Pulse 83   Temp (!) 97 3 °F (36 3 °C) (Temporal)   Ht 5' 1 5" (1 562 m)   Wt 42 5 kg (93 lb 12 8 oz)   SpO2 96%   BMI 17 44 kg/m²     Physical Exam  Constitutional:       General: She is not in acute distress  Comments: Cachectic , underweight    HENT:      Head: Normocephalic and atraumatic  Nose: Nose normal       Mouth/Throat:      Mouth: Mucous membranes are moist    Eyes:      General: No scleral icterus  Extraocular Movements: Extraocular movements intact  Pupils: Pupils are equal, round, and reactive to light  Cardiovascular:      Rate and Rhythm: Normal rate and regular rhythm  Pulses: Normal pulses  Heart sounds: Normal heart sounds  No murmur  No gallop  Pulmonary:      Effort: Pulmonary effort is normal  No respiratory distress  Breath sounds: No wheezing  Abdominal:      General: Abdomen is flat  Bowel sounds are normal       Palpations: Abdomen is soft  Musculoskeletal:      Right lower leg: No edema  Left lower leg: No edema  Skin:     Capillary Refill: Capillary refill takes less than 2 seconds  Coloration: Skin is not jaundiced  Neurological:      General: No focal deficit present  Mental Status: She is alert and oriented to person, place, and time  Cranial Nerves: No cranial nerve deficit  Sensory: No sensory deficit  Motor: No weakness  Psychiatric:         Mood and Affect: Mood normal          Behavior: Behavior normal          Thought Content:  Thought content normal          Judgment: Judgment normal          Pertinent Laboratory/Diagnostic Studies:  CBC:   Lab Results   Component Value Date/Time    WBC 7 35 07/25/2020 09:09 AM    RBC 4 14 07/25/2020 09:09 AM    HGB 12 1 07/25/2020 09:09 AM    HGB 12 2 01/16/2015 12:45 AM    HCT 38 2 07/25/2020 09:09 AM    MCV 92 07/25/2020 09:09 AM    MCH 29 2 07/25/2020 09:09 AM    MCHC 31 7 07/25/2020 09:09 AM    RDW 14 6 07/25/2020 09:09 AM    MPV 9 6 07/25/2020 09:09 AM     07/25/2020 09:09 AM    NRBC 0 07/25/2020 09:09 AM    NEUTOPHILPCT 62 07/25/2020 09:09 AM    LYMPHOPCT 31 07/25/2020 09:09 AM    MONOPCT 6 07/25/2020 09:09 AM    EOSPCT 1 07/25/2020 09:09 AM    BASOPCT 0 07/25/2020 09:09 AM    NEUTROABS 4 53 07/25/2020 09:09 AM    LYMPHSABS 2 26 07/25/2020 09:09 AM    MONOSABS 0 44 07/25/2020 09:09 AM    EOSABS 0 06 07/25/2020 09:09 AM     Chemistry Profile:   Lab Results   Component Value Date/Time    K 4 7 07/25/2020 09:09 AM     07/25/2020 09:09 AM    CO2 28 07/25/2020 09:09 AM    ANIONGAP 19 01/16/2015 12:45 AM    BUN 11 07/25/2020 09:09 AM    CREATININE 0 72 07/25/2020 09:09 AM    GLUF 83 07/25/2020 09:09 AM    CALCIUM 9 0 07/25/2020 09:09 AM    AST 11 07/25/2020 09:09 AM    ALT 19 07/25/2020 09:09 AM    ALKPHOS 61 07/25/2020 09:09 AM    EGFR 98 07/25/2020 09:09 AM     Endocrine Studies:   Lab Results   Component Value Date/Time    TRIG 105 07/25/2020 09:09 AM    CHOLESTEROL 207 (H) 07/25/2020 09:09 AM    HDL 55 07/25/2020 09:09 AM    LDLCALC 131 (H) 07/25/2020 09:09 AM    NONHDLC 152 07/25/2020 09:09 AM     Health Maintenance: No results found for: PSA, HEPCAB    Current Medications     Current Outpatient Medications:     ibuprofen (MOTRIN) 600 mg tablet, Take by mouth every 6 (six) hours as needed for mild pain, Disp: , Rfl:     buPROPion (WELLBUTRIN XL) 150 mg 24 hr tablet, Take one tablet by mouth daily for the first three days followed by twice a day afterward, Disp: 30 tablet, Rfl: 5    nicotine polacrilex (COMMIT) 2 MG lozenge, Apply 1 lozenge (2 mg total) to the mouth or throat as needed for smoking cessation, Disp: 100 each, Rfl: 1    polyethylene glycol (Golytely) 4000 mL solution, Take 4,000 mL by mouth once for 1 dose (Patient not taking: Reported on 3/31/2021), Disp: 4000 mL, Rfl: 0   sulfamethoxazole-trimethoprim (BACTRIM DS) 800-160 mg per tablet, Take 1 tablet by mouth 2 (two) times a day for 3 days, Disp: 6 tablet, Rfl: 0    Health Maintenance     Health Maintenance   Topic Date Due    Depression Remission PHQ  Never done    HIV Screening  Never done    COVID-19 Vaccine (1) Never done    BMI: Followup Plan  Never done    Influenza Vaccine (1) 09/01/2020    Annual Physical  07/24/2021    BMI: Adult  03/18/2022    MAMMOGRAM  03/18/2022    Cervical Cancer Screening  07/16/2025    DTaP,Tdap,and Td Vaccines (2 - Td) 02/01/2029    Colorectal Cancer Screening  08/04/2030    Pneumococcal Vaccine: Pediatrics (0 to 5 Years) and At-Risk Patients (6 to 59 Years)  Completed    HIB Vaccine  Aged Out    Hepatitis B Vaccine  Aged Out    IPV Vaccine  Aged Out    Hepatitis A Vaccine  Aged Out    Meningococcal ACWY Vaccine  Aged Out    HPV Vaccine  Aged [employer] History   Administered Date(s) Administered    INFLUENZA 10/19/2015, 09/07/2018    Influenza, injectable, quadrivalent, preservative free 0 5 mL 09/07/2018    Pneumococcal Polysaccharide PPV23 09/07/2018    Tdap 02/01/2019       Ramon Schumacher, DO  Internal Medicine  PGY-1  3/31/2021 1:22 PM      BMI Counseling: Body mass index is 17 44 kg/m²  The BMI is below normal  Patient was advised to gain weight

## 2021-04-06 ENCOUNTER — NURSE TRIAGE (OUTPATIENT)
Dept: OTHER | Facility: OTHER | Age: 51
End: 2021-04-06

## 2021-04-06 ENCOUNTER — OFFICE VISIT (OUTPATIENT)
Dept: URGENT CARE | Age: 51
End: 2021-04-06
Payer: COMMERCIAL

## 2021-04-06 VITALS
RESPIRATION RATE: 18 BRPM | TEMPERATURE: 99.7 F | WEIGHT: 92 LBS | OXYGEN SATURATION: 97 % | BODY MASS INDEX: 17.37 KG/M2 | HEART RATE: 112 BPM | HEIGHT: 61 IN

## 2021-04-06 DIAGNOSIS — R51.9 ACUTE NONINTRACTABLE HEADACHE, UNSPECIFIED HEADACHE TYPE: ICD-10-CM

## 2021-04-06 DIAGNOSIS — R05.9 COUGH: ICD-10-CM

## 2021-04-06 DIAGNOSIS — R52 BODY ACHES: Primary | ICD-10-CM

## 2021-04-06 DIAGNOSIS — J02.9 SORE THROAT: ICD-10-CM

## 2021-04-06 PROCEDURE — U0003 INFECTIOUS AGENT DETECTION BY NUCLEIC ACID (DNA OR RNA); SEVERE ACUTE RESPIRATORY SYNDROME CORONAVIRUS 2 (SARS-COV-2) (CORONAVIRUS DISEASE [COVID-19]), AMPLIFIED PROBE TECHNIQUE, MAKING USE OF HIGH THROUGHPUT TECHNOLOGIES AS DESCRIBED BY CMS-2020-01-R: HCPCS | Performed by: PHYSICIAN ASSISTANT

## 2021-04-06 PROCEDURE — U0005 INFEC AGEN DETEC AMPLI PROBE: HCPCS | Performed by: PHYSICIAN ASSISTANT

## 2021-04-06 PROCEDURE — 99213 OFFICE O/P EST LOW 20 MIN: CPT | Performed by: PHYSICIAN ASSISTANT

## 2021-04-06 NOTE — PATIENT INSTRUCTIONS

## 2021-04-06 NOTE — TELEPHONE ENCOUNTER
Regarding:  Body aches and Sore Throat    ----- Message from Cynthia Mars sent at 4/6/2021  6:16 PM EDT -----  " I was fine this morning and around 1:00pm I started feeling very sick, I feel chilled and my whole body hurts, Coughing, Stuffy Nose and my Throat hurts a lot  "

## 2021-04-06 NOTE — TELEPHONE ENCOUNTER
Reason for Disposition   MILD difficulty breathing (e g , minimal/no SOB at rest, SOB with walking, pulse <100)    Answer Assessment - Initial Assessment Questions  Were you within 6 feet or less, for up to 15 minutes or more with a person that has a confirmed COVID-19 test?        Denies         What was the date of your exposure?        unknown         Are you experiencing any symptoms attributed to the virus?  (Assess for SOB, cough, fever, difficulty breathing)        Yes   Shortness of breath,  Cough, congestion, stuffy nose, sore throat, chilled and whole body hurts          HIGH RISK: Do you have any history heart or lung conditions, weakened immune system, diabetes, Asthma, CHF, HIV, COPD, Chemo, renal failure, sickle cell, etc?        Denies    Protocols used: CORONAVIRUS (COVID-19) DIAGNOSED OR SUSPECTED-ADULT-

## 2021-04-06 NOTE — LETTER
April 6, 2021     Patient: Trinidad Valdez   YOB: 1970   Date of Visit: 4/6/2021       To Whom It May Concern: It is my medical opinion that Trinidad Valdez should remain out of work until results of testing are available and negative             Sincerely,        Temo Boston PA-C    CC: No Recipients

## 2021-04-07 LAB — SARS-COV-2 RNA RESP QL NAA+PROBE: POSITIVE

## 2021-04-07 NOTE — PROGRESS NOTES
[address]        NAME: Edith Michael is a 46 y o  female  : 1970    MRN: [id number]  DATE: 2021  TIME: 8:48 PM    Assessment and Plan   Body aches [R52]  1  Body aches  [facility]ov[facility]   2  Sore throat  Novel Coronavirus (Covid-19),PCR [facility] - Office Collection   3  Acute nonintractable headache, unspecified headache type  Novel Coronavirus (Covid-19),PCR [facility] - Office Collection   4  Cough  Novel Coronavirus (Covid-19),PCR [facility] - Office Collection         Patient Instructions       Follow up with PCP in 3-5 days  Proceed to  ER if symptoms worsen  Chief Complaint     Chief Complaint   Patient presents with    Generalized Body Aches     since this afternoon   Sore Throat    Headache    Shortness of Breath         History of Present Illness         Patient for evaluation sore throat, body aches, cough, headache which started earlier today  Patient denies any known COVID positive exposure  She is not taking anything yet for her symptoms  Review of Systems   Review of Systems   Constitutional: Positive for chills  Negative for activity change, appetite change, fatigue and fever  HENT: Positive for sore throat  Negative for congestion, ear discharge, ear pain, rhinorrhea, sinus pressure, sinus pain and trouble swallowing  Eyes: Negative  Respiratory: Positive for cough and shortness of breath  Negative for chest tightness and wheezing  Cardiovascular: Negative  Gastrointestinal: Negative  Neurological: Positive for headaches  Negative for dizziness, tremors, seizures, syncope, facial asymmetry, speech difficulty, weakness, light-headedness and numbness           Current Medications       Current Outpatient Medications:     buPROPion (WELLBUTRIN XL) 150 mg 24 hr tablet, Take one tablet by mouth daily for the first three days followed by twice a day afterward, Disp: 30 tablet, Rfl: 5    ibuprofen (MOTRIN) 600 mg tablet, Take by mouth every 6 (six) hours as needed for mild pain, Disp: , Rfl:     nicotine polacrilex (COMMIT) 2 MG lozenge, Apply 1 lozenge (2 mg total) to the mouth or throat as needed for smoking cessation, Disp: 100 each, Rfl: 1    polyethylene glycol (Golytely) 4000 mL solution, Take 4,000 mL by mouth once for 1 dose (Patient not taking: Reported on 3/31/2021), Disp: 4000 mL, Rfl: 0    Current Allergies     Allergies as of 2021    (No Known Allergies)            The following portions of the patient's history were reviewed and updated as appropriate: allergies, current medications, past family history, past medical history, past social history, past surgical history and problem list      Past Medical History:   Diagnosis Date    Abnormal Pap smear of cervix     HPV and PAP smear 2020 normal     Anxiety     Anxiety and depression     no longer on meds    Breast lump     hx of right breast lump in lumpectomy in     Depression     Migraine     Nicotine dependence     currently smoker, 33 pack year hx       Past Surgical History:   Procedure Laterality Date    BREAST EXCISIONAL BIOPSY Right     excisional- done in [address] [place] [address] LUMPECTOMY Right     benign     SECTION      x's 4    DILATION AND CURETTAGE, DIAGNOSTIC / THERAPEUTIC      pre cancerous cells?     MAMMO (HISTORICAL)  2018    MULTIPLE TOOTH EXTRACTIONS      X2    TUBAL LIGATION         Family History   Problem Relation Age of Onset    No Known Problems Daughter     No Known Problems Son     No Known Problems Son     No Known Problems Son     Hypertension Mother     Aneurysm Mother     No Known Problems Father     No Known Problems Sister     No Known Problems Maternal Grandmother     No Known Problems Maternal Grandfather     No Known Problems Paternal Grandmother     No Known Problems Paternal Grandfather     Stroke Brother     No Known Problems Brother     No Known Problems Maternal Aunt          Medications have been verified  Objective   Pulse (!) 112   Temp 99 7 °F (37 6 °C) (Temporal)   Resp 18   Ht 5' 1" (1 549 m)   Wt 41 7 kg (92 lb)   SpO2 97%   BMI 17 38 kg/m²   No LMP recorded  Patient is premenopausal        Physical Exam     Physical Exam  Vitals signs and nursing note reviewed  Constitutional:       General: She is not in acute distress  Appearance: Normal appearance  She is well-developed  She is not ill-appearing, toxic-appearing or diaphoretic  HENT:      Head: Normocephalic and atraumatic  Right Ear: Tympanic membrane and ear canal normal       Left Ear: Tympanic membrane and ear canal normal       Nose: Nose normal  No congestion or rhinorrhea  Mouth/Throat:      Mouth: Mucous membranes are moist       Pharynx: Posterior oropharyngeal erythema present  No pharyngeal swelling, oropharyngeal exudate or uvula swelling  Tonsils: No tonsillar exudate or tonsillar abscesses  0 on the right  0 on the left  Eyes:      General:         Right eye: No discharge  Left eye: No discharge  Extraocular Movements: Extraocular movements intact  Conjunctiva/sclera: Conjunctivae normal       Pupils: Pupils are equal, round, and reactive to light  Neck:      Musculoskeletal: Normal range of motion  Cardiovascular:      Rate and Rhythm: Normal rate and regular rhythm  Heart sounds: Normal heart sounds  No murmur  Pulmonary:      Effort: Pulmonary effort is normal  No respiratory distress  Breath sounds: Normal breath sounds  No stridor  No wheezing, rhonchi or rales  Lymphadenopathy:      Cervical: No cervical adenopathy  Skin:     General: Skin is warm and dry  Findings: No rash  Neurological:      General: No focal deficit present  Mental Status: She is alert and oriented to person, place, and time     Psychiatric:         Mood and Affect: Mood normal          Behavior: Behavior normal          Thought Content:  Thought content normal          Judgment: Judgment normal

## 2021-04-08 ENCOUNTER — TELEMEDICINE (OUTPATIENT)
Dept: INTERNAL MEDICINE CLINIC | Facility: CLINIC | Age: 51
End: 2021-04-08

## 2021-04-08 DIAGNOSIS — R05.8 DRY COUGH: ICD-10-CM

## 2021-04-08 DIAGNOSIS — J02.8 VIRAL SORE THROAT: ICD-10-CM

## 2021-04-08 DIAGNOSIS — R52 BODY ACHES: ICD-10-CM

## 2021-04-08 DIAGNOSIS — U07.1 COVID-19 VIRUS INFECTION: Primary | ICD-10-CM

## 2021-04-08 DIAGNOSIS — R53.83 TIRED: ICD-10-CM

## 2021-04-08 DIAGNOSIS — B97.89 VIRAL SORE THROAT: ICD-10-CM

## 2021-04-08 PROCEDURE — 99212 OFFICE O/P EST SF 10 MIN: CPT | Performed by: PHYSICIAN ASSISTANT

## 2021-04-08 NOTE — PROGRESS NOTES
COVID-19 Outpatient Progress Note    Assessment/Plan:    Problem List Items Addressed This Visit        Other    COVID-19 virus infection - Primary      Other Visit Diagnoses     Dry cough        Body aches        Tired        Viral sore throat             Disposition:     I recommended continued isolation until at least 24 hours have passed since recovery defined as resolution of fever without the use of fever-reducing medications AND improvement in COVID symptoms AND 10 days have passed since onset of symptoms (or 10 days have passed since date of first positive viral diagnostic test for asymptomatic patients)  On your 1st virtual follow-up visit you are aware your test was positive for COVID-19  We did discuss symptoms and while you do note some improvement over the past 2 days you are still experiencing a mild dry cough, sore throat, body aches and admit feeling a bit more tired than usual   We did discuss you do not have a thermometer but you are not experiencing chills today but you did have them the previous 2 days  You confirm drinking lots of liquids  We also discussed that you may take Tylenol or Advil or ibuprofen as needed for headache, body aches and sore throat  Also encourage food as tolerated including soup or toast if that is all you are hungry for  We did discuss that you are to remain quarantine from others in the household as much as possible but if you are in common areas crossing pads to make sure everyone is wearing a mask  Please make sure to wipe down all common surfaces especially bathroom and kitchen after using  At this time you are reporting her  does have some symptoms and he has asthma and would ask that he contact his PCP today for further guidance  You report currently your adult sons do not have any symptoms but do live in the household and are remaining     Option is that if they are not tested 5 days after exposure they will need to quarantine for an additional 10 days after you are released or 7 days if they test negative  We reviewed guidelines of 10 days after onset of symptoms but also by day 10 as long as you do not have fever, severe cough or diarrhea for at least 24 hours we will then be able to release you from quarantine  We discussed based on onset of her symptoms this will be through Friday April 16th  We will get you scheduled for ongoing virtual visits to monitor your symptoms and make sure you are improving  We did discuss however that if at any time you feel you are getting worse, coughing fever shortness of breath or difficulty breathing to contact our office immediately however if it is overnight or on the weekend to go directly to the emergency room  Also report that you will be able to contact your daughter who does not live in her household if you need any medications food or other supplies she can pick them up and drop them off outside of your home  [address]    Your healthcare provider and/or public health staff have evaluated you and have determined that you do not need to remain in the hospital at this time   At this time you can be isolated at home where you will be monitored by staff from your local or state health department  You should carefully follow the prevention and isolation steps below until a healthcare provider or local or state health department says that you can return to your normal activities  Stay home except to get medical care    People who are mildly ill with COVID-19 are able to isolate at home during their illness  You should restrict activities outside your home, except for getting medical care  Do not go to work, school, or public areas  Avoid using public transportation, ride-sharing, or taxis  Separate yourself from other people and animals in your home    People: As much as possible, you should stay in a specific room and away from other people in your home   Also, you should use a separate bathroom, if available  Animals: You should restrict contact with pets and other animals while you are sick with COVID-19, just like you would around other people  Although there have not been reports of pets or other animals becoming sick with COVID-19, it is still recommended that people sick with COVID-19 limit contact with animals until more information is known about the virus  When possible, have another member of your household care for your animals while you are sick  If you are sick with COVID-19, avoid contact with your pet, including petting, snuggling, being kissed or licked, and sharing food  If you must care for your pet or be around animals while you are sick, wash your hands before and after you interact with pets and wear a facemask  See COVID-19 and Animals for more information  Call ahead before visiting your doctor    If you have a medical appointment, call the healthcare provider and tell them that you have or may have COVID-19  This will help the healthcare providers office take steps to keep other people from getting infected or exposed  Wear a facemask    You should wear a facemask when you are around other people (e g , sharing a room or vehicle) or pets and before you enter a healthcare providers office  If you are not able to wear a facemask (for example, because it causes trouble breathing), then people who live with you should not stay in the same room with you, or they should wear a facemask if they enter your room  Cover your coughs and sneezes    Cover your mouth and nose with a tissue when you cough or sneeze  Throw used tissues in a lined trash can  Immediately wash your hands with soap and water for at least 20 seconds or, if soap and water are not available, clean your hands with an alcohol-based hand  that contains at least 60% alcohol      Clean your hands often    Wash your hands often with soap and water for at least 20 seconds, especially after blowing your nose, coughing, or sneezing; going to the bathroom; and before eating or preparing food  If soap and water are not readily available, use an alcohol-based hand  with at least 60% alcohol, covering all surfaces of your hands and rubbing them together until they feel dry  Soap and water are the best option if hands are visibly dirty  Avoid touching your eyes, nose, and mouth with unwashed hands  Avoid sharing personal household items    You should not share dishes, drinking glasses, cups, eating utensils, towels, or bedding with other people or pets in your home  After using these items, they should be washed thoroughly with soap and water  Clean all high-touch surfaces everyday    High touch surfaces include counters, tabletops, doorknobs, bathroom fixtures, toilets, phones, keyboards, tablets, and bedside tables  Also, clean any surfaces that may have blood, stool, or body fluids on them  Use a household cleaning spray or wipe, according to the label instructions  Labels contain instructions for safe and effective use of the cleaning product including precautions you should take when applying the product, such as wearing gloves and making sure you have good ventilation during use of the product  Monitor your symptoms    Seek prompt medical attention if your illness is worsening (e g , difficulty breathing)  Before seeking care, call your healthcare provider and tell them that you have, or are being evaluated for, COVID-19  Put on a facemask before you enter the facility  These steps will help the healthcare providers office to keep other people in the office or waiting room from getting infected or exposed  Ask your healthcare provider to call the local or [facility] health department   Persons who are placed under active monitoring or facilitated self-monitoring should follow instructions provided by their local health department or occupational health professionals, as appropriate  If you have a medical emergency and need to call 911, notify the dispatch personnel that you have, or are being evaluated for COVID-19  If possible, put on a facemask before emergency medical services arrive  Discontinuing home isolation    Patients with confirmed COVID-19 should remain under home isolation precautions until the following conditions are met: They have had no fever for at least 24 hours (that is one full day of no fever without the use medicine that reduces fevers)  AND  other symptoms have improved (for example, when their cough or shortness of breath have improved)  AND  If had mild or moderate illness, at least 10 days have passed since their symptoms first appeared or if severe illness (needed oxygen) or immunosuppressed, at least 20 days have passed since symptoms first appeared  Patients with confirmed COVID-19 should also notify close contacts (including their workplace) and ask that they self-quarantine  Currently, close contact is defined as being within 6 feet for 15 minutes or more from the period 24 hours starting 48 hours before symptom onset to the time at which the patient went into isolation   Close contacts of patients diagnosed with COVID-19 should be instructed by the patient to self-quarantine for 14 days from the last time of their last contact with the patient  Source: [employer]    Discussed plasma donation program with patient and they are interested  [facility]ification of eligibility [facility] to [facility]  I have spent 17 minutes directly with the patient  Greater than 50% of this time was spent in counseling/coordination of care regarding: diagnostic results, risks and benefits of treatment options, instructions for management, patient and family education, importance of treatment compliance and risk factor reductions          Encounter provider Malia Zheng ARIELLE    Provider located at [address] [place] [address] [address]  [place] [id number] [phone]    Recent Visits  No visits were found meeting these conditions  Showing recent visits within past 7 days and meeting all other requirements     Today's Visits  Date Type Provider Dept   04/08/21 Telemedicine Florinda Yousif PA-C  [address] [facility] [address] today's visits and meeting all other requirements     Future Appointments  No visits were found meeting these conditions  Showing future appointments within next 150 days and meeting all other requirements      This virtual check-in was done via [employer] and patient was informed that this is a secure, HIPAA-compliant platform  She agrees to proceed  Patient agrees to participate in a virtual check in via telephone or video visit instead of presenting to the office to address urgent/immediate medical needs  Patient is aware this is a billable service  After connecting through [facility], the patient was identified by name and date of birth  Jeff Guidry was informed that this was a telemedicine visit and that the exam was being conducted confidentially over secure lines  My office door was closed  No one else was in the room  Jeff Guidry acknowledged consent and understanding of privacy and security of the telemedicine visit  I informed the patient that I have reviewed her record in Epic and presented the opportunity for her to ask any questions regarding the visit today  The patient agreed to participate  Subjective:   Jeff Guidry is a 46 y o  female who has been screened for COVID-19  Symptom change since last report: improving  Patient's symptoms include fatigue, sore throat, cough (improving and dry) and myalgias  Patient denies chills, anosmia, loss of taste, shortness of breath, chest tightness, nausea, vomiting and diarrhea       Pranayjavi Judson has been staying home and has isolated themselves in her home  She is taking care to not share personal items and is cleaning all surfaces that are touched often, like counters, tabletops, and doorknobs using household cleaning sprays or wipes  She is wearing a mask when she leaves her room  Date of symptom onset: 4/6/2021  Date of positive COVID-19 PCR: 4/6/2021    Patient is scheduled for 1st virtual follow-up after positive COVID test   Patient had presented to urgent care on April 6 with complaint of body aches, cough, sore throat and headache  Patient was tested for COVID and result is positive  Patient confirms symptoms started that day  Today mild dry cough  Chills yesterday none today    States general body aches less mostly legs achy and some neck pain  Lives with  and 2 children 29 and 24  States children no symptoms  does have symptoms but has not been tested  Boys work in same location as mother and reports that they are HR department knows that all 3 of them are under quarantine  Patient states that the option for her adult sons is that if they do not get tested 5 days after exposure then their 10 days of quarantine will start once she is released from quarantine  At this time based on her onset of symptoms she will be under quarantine through Friday April 16th and as long as meats guidelines which will be 10 days and discuss no fever no severe cough or diarrhea for at least 24 hours without medication she will be able to be released from isolation  That is the day that the adult sons will need to start there quarantine  Did discuss with patient however that she reports her  is having some symptoms and that he has underlying asthma and urged importance of contacting his PCP today for evaluation                    Lab Results   Component Value Date    SARSCOV2 Positive (A) 04/06/2021     Past Medical History:   Diagnosis Date    Abnormal Pap smear of cervix     HPV and PAP smear 07/16/2020 normal     Anxiety  Anxiety and depression     no longer on meds    Breast lump     hx of right breast lump in lumpectomy in     Depression     Migraine     Nicotine dependence     currently smoker, 33 pack year hx     Past Surgical History:   Procedure Laterality Date    BREAST EXCISIONAL BIOPSY Right 2014    excisional- done in [address] [place] [address] LUMPECTOMY Right 2014    benign     SECTION      x's 4    DILATION AND CURETTAGE, DIAGNOSTIC / THERAPEUTIC      pre cancerous cells?  MAMMO (HISTORICAL)  2018    MULTIPLE TOOTH EXTRACTIONS      X2    TUBAL LIGATION       Current Outpatient Medications   Medication Sig Dispense Refill    ibuprofen (MOTRIN) 600 mg tablet Take by mouth every 6 (six) hours as needed for mild pain      buPROPion (WELLBUTRIN XL) 150 mg 24 hr tablet Take one tablet by mouth daily for the first three days followed by twice a day afterward (Patient not taking: Reported on 2021) 30 tablet 5    nicotine polacrilex (COMMIT) 2 MG lozenge Apply 1 lozenge (2 mg total) to the mouth or throat as needed for smoking cessation (Patient not taking: Reported on 2021) 100 each 1    polyethylene glycol (Golytely) 4000 mL solution Take 4,000 mL by mouth once for 1 dose (Patient not taking: Reported on 3/31/2021) 4000 mL 0     No current facility-administered medications for this visit  No Known Allergies    Review of Systems   Constitutional: Positive for fatigue  Negative for chills  HENT: Positive for sore throat  Respiratory: Positive for cough (improving and dry)  Negative for chest tightness and shortness of breath  Gastrointestinal: Negative for diarrhea, nausea and vomiting  Musculoskeletal: Positive for myalgias  Objective:    Vitals:       Physical Exam  Nursing note reviewed  Constitutional:       General: She is not in acute distress  Appearance: She is not ill-appearing  HENT:      Head: Normocephalic     Eyes:      Conjunctiva/sclera: Conjunctivae normal    Neck:      Musculoskeletal: Neck supple  Pulmonary:      Effort: Pulmonary effort is normal  No respiratory distress  Neurological:      Mental Status: She is alert  Psychiatric:         Mood and Affect: Mood normal        VIRTUAL VISIT DISCLAIMER    Ashok Hussein acknowledges that she has consented to an online visit or consultation  She understands that the online visit is based solely on information provided by her, and that, in the absence of a face-to-face physical evaluation by the physician, the diagnosis she receives is both limited and provisional in terms of accuracy and completeness  This is not intended to replace a full medical face-to-face evaluation by the physician  Ashok Hussein understands and accepts these terms

## 2021-04-09 ENCOUNTER — TELEPHONE (OUTPATIENT)
Dept: INTERNAL MEDICINE CLINIC | Facility: CLINIC | Age: 51
End: 2021-04-09

## 2021-04-12 ENCOUNTER — TELEMEDICINE (OUTPATIENT)
Dept: INTERNAL MEDICINE CLINIC | Facility: CLINIC | Age: 51
End: 2021-04-12

## 2021-04-12 DIAGNOSIS — U07.1 COVID-19: Primary | ICD-10-CM

## 2021-04-12 PROCEDURE — 99212 OFFICE O/P EST SF 10 MIN: CPT | Performed by: INTERNAL MEDICINE

## 2021-04-12 NOTE — PROGRESS NOTES
COVID-19 Outpatient Progress Note    Assessment/Plan:    Problem List Items Addressed This Visit     None         Disposition:     I recommended continued isolation until at least 24 hours have passed since recovery defined as resolution of fever without the use of fever-reducing medications AND improvement in COVID symptoms AND 10 days have passed since onset of symptoms (or 10 days have passed since date of first positive viral diagnostic test for asymptomatic patients)  We discussed that pt can take additional tylenol daily up to 1000mg every 8 hours for the short term as well as use advil or other antiinflammatory  Return precautions were discussed in detail  Pt was advised that she should stop any nsaid or tylenol usage prior to discontinuing isolation as they may mask fevers and she should be without fevers for at least one day before she goes back to work  Work note provided to [address] for patient to return to work on 4/19 as long as she is feeling better and without fevers  I have spent 20 minutes directly with the patient  Greater than 50% of this time was spent in counseling/coordination of care regarding: diagnostic results, instructions for management and patient and family education  Encounter provider Aylin Steiner DO    Provider located at [id number] [place] [address] [id number] [phone]    Recent Visits  Date Type Provider Dept   04/09/21 Telephone [facility], [address] [facility] [address]   04/08/21 [facility], [address] [facility] recent visits within past 7 days and meeting all other requirements     Today's Visits  Date Type Provider Dept   04/12/21 Telemedicine [facility], [address] [place] [address] [facility] today's visits and meeting all other requirements     Future Appointments  No visits were found meeting these conditions     Showing future appointments within next 150 days and meeting all other requirements      This virtual check-in was done via [employer] and patient was informed that this is a secure, HIPAA-compliant platform  She agrees to proceed  Patient agrees to participate in a virtual check in via telephone or video visit instead of presenting to the office to address urgent/immediate medical needs  Patient is aware this is a billable service  After connecting through [place], the patient was identified by name and date of birth  Mari Cutler was informed that this was a telemedicine visit and that the exam was being conducted confidentially over secure lines  My office door was closed  No one else was in the room  Mari Cutler acknowledged consent and understanding of privacy and security of the telemedicine visit  I informed the patient that I have reviewed her record in Epic and presented the opportunity for her to ask any questions regarding the visit today  The patient agreed to participate  Subjective:   Mari Cutler is a 46 y o  female who has been screened for COVID-19  Symptom change since last report: improving  Patient's symptoms include loss of taste, cough, shortness of breath (slight SOB, improving) and myalgias  Patient denies fever, chills, congestion, anosmia, diarrhea and headaches  Prem Olguin has been staying home and has isolated themselves in her home  She is taking care to not share personal items and is cleaning all surfaces that are touched often, like counters, tabletops, and doorknobs using household cleaning sprays or wipes  She is wearing a mask when she leaves her room  Date of symptom onset: 4/6/2021  Date of positive COVID-19 PCR: 4/6/2021    Pt overall feels better but feels left neck pain going down to shoulder  Feels a little better but still hurts  Pt is taking tylenol 500mg two tablets once per day  Productive cough but getting better  Reports some SOB but getting better  Today sore throat seems much better  Reports she can smell a little bit  Visit was initially via video but with difficulty with sound, was switched after physical exam to telephone visit via [employer]  Lab Results   Component Value Date    SARSCOV2 Positive (A) 2021     Past Medical History:   Diagnosis Date    Abnormal Pap smear of cervix     HPV and PAP smear 2020 normal     Anxiety     Anxiety and depression     no longer on meds    Breast lump     hx of right breast lump in lumpectomy in     Depression     Migraine     Nicotine dependence     currently smoker, 33 pack year hx     Past Surgical History:   Procedure Laterality Date    BREAST EXCISIONAL BIOPSY Right     excisional- done in [address] [place] [address] LUMPECTOMY Right     benign     SECTION      x's 4    DILATION AND CURETTAGE, DIAGNOSTIC / THERAPEUTIC      pre cancerous cells?  MAMMO (HISTORICAL)  2018    MULTIPLE TOOTH EXTRACTIONS      X2    TUBAL LIGATION       Current Outpatient Medications   Medication Sig Dispense Refill    ibuprofen (MOTRIN) 600 mg tablet Take by mouth every 6 (six) hours as needed for mild pain      buPROPion (WELLBUTRIN XL) 150 mg 24 hr tablet Take one tablet by mouth daily for the first three days followed by twice a day afterward (Patient not taking: Reported on 2021) 30 tablet 5    nicotine polacrilex (COMMIT) 2 MG lozenge Apply 1 lozenge (2 mg total) to the mouth or throat as needed for smoking cessation (Patient not taking: Reported on 2021) 100 each 1    polyethylene glycol (Golytely) 4000 mL solution Take 4,000 mL by mouth once for 1 dose (Patient not taking: Reported on 3/31/2021) 4000 mL 0     No current facility-administered medications for this visit  No Known Allergies    Review of Systems   Constitutional: Negative for chills and fever  HENT: Negative for congestion and ear pain  Respiratory: Positive for cough and shortness of breath (slight SOB, improving)      Cardiovascular: Negative for chest pain, palpitations and leg swelling  Gastrointestinal: Negative for diarrhea  Genitourinary: Negative for difficulty urinating and dysuria  Musculoskeletal: Positive for myalgias  Neurological: Negative for dizziness, light-headedness and headaches  Objective:    Vitals:       Physical Exam  Constitutional:       Appearance: Normal appearance  She is well-developed  HENT:      Head: Normocephalic and atraumatic  Nose: Nose normal    Eyes:      General:         Right eye: No discharge  Left eye: No discharge  Neck:      Musculoskeletal: Normal range of motion  Pulmonary:      Effort: Pulmonary effort is normal    Skin:     General: Skin is dry  Neurological:      Mental Status: She is alert and oriented to person, place, and time  Psychiatric:         Mood and Affect: Mood normal          Speech: Speech normal          Behavior: Behavior normal        VIRTUAL VISIT DISCLAIMER    Jennifer Covarrubias acknowledges that she has consented to an online visit or consultation  She understands that the online visit is based solely on information provided by her, and that, in the absence of a face-to-face physical evaluation by the physician, the diagnosis she receives is both limited and provisional in terms of accuracy and completeness  This is not intended to replace a full medical face-to-face evaluation by the physician  Jennifer Covarrubias understands and accepts these terms

## 2021-04-12 NOTE — LETTER
April 12, 2021     Patient: Niya Baker   YOB: 1970   Date of Visit: 4/12/2021       To Whom it May Concern:    Niya Baker is under my professional care  She was seen in my office virtually on 4/12/2021  She may return to work on 4/19/2021  She does not need to repeat a COVID test prior to returning to work  If you have any questions or concerns, please don't hesitate to call           Sincerely,          Winnie Caban DO        CC: No Recipients

## 2021-04-13 ENCOUNTER — TELEMEDICINE (OUTPATIENT)
Dept: INTERNAL MEDICINE CLINIC | Facility: CLINIC | Age: 51
End: 2021-04-13

## 2021-04-13 DIAGNOSIS — R10.33 PERIUMBILICAL ABDOMINAL PAIN: Primary | ICD-10-CM

## 2021-04-13 DIAGNOSIS — Z23 ENCOUNTER FOR IMMUNIZATION: ICD-10-CM

## 2021-04-13 DIAGNOSIS — U07.1 COVID-19 VIRUS INFECTION: ICD-10-CM

## 2021-04-13 DIAGNOSIS — R11.2 NAUSEA AND VOMITING, INTRACTABILITY OF VOMITING NOT SPECIFIED, UNSPECIFIED VOMITING TYPE: ICD-10-CM

## 2021-04-13 DIAGNOSIS — G43.709 CHRONIC MIGRAINE WITHOUT AURA WITHOUT STATUS MIGRAINOSUS, NOT INTRACTABLE: ICD-10-CM

## 2021-04-13 PROCEDURE — 99214 OFFICE O/P EST MOD 30 MIN: CPT | Performed by: PHYSICIAN ASSISTANT

## 2021-04-13 RX ORDER — ONDANSETRON 8 MG/1
8 TABLET, ORALLY DISINTEGRATING ORAL EVERY 8 HOURS PRN
Qty: 30 TABLET | Refills: 0 | Status: SHIPPED | OUTPATIENT
Start: 2021-04-13 | End: 2021-04-16

## 2021-04-13 RX ORDER — DICYCLOMINE HCL 20 MG
20 TABLET ORAL EVERY 6 HOURS PRN
Qty: 30 TABLET | Refills: 0 | Status: SHIPPED | OUTPATIENT
Start: 2021-04-13 | End: 2021-04-16

## 2021-04-13 RX ORDER — ACETAMINOPHEN AND CODEINE PHOSPHATE 300; 30 MG/1; MG/1
1 TABLET ORAL EVERY 6 HOURS PRN
Qty: 8 TABLET | Refills: 0 | Status: SHIPPED | OUTPATIENT
Start: 2021-04-13 | End: 2021-04-16

## 2021-04-14 ENCOUNTER — TELEPHONE (OUTPATIENT)
Dept: INTERNAL MEDICINE CLINIC | Facility: CLINIC | Age: 51
End: 2021-04-14

## 2021-04-14 DIAGNOSIS — G43.719 INTRACTABLE CHRONIC MIGRAINE WITHOUT AURA AND WITHOUT STATUS MIGRAINOSUS: Primary | ICD-10-CM

## 2021-04-14 RX ORDER — KETOROLAC TROMETHAMINE 10 MG/1
10 TABLET, FILM COATED ORAL EVERY 6 HOURS PRN
Qty: 10 TABLET | Refills: 0 | Status: SHIPPED | OUTPATIENT
Start: 2021-04-14 | End: 2021-04-16

## 2021-04-14 NOTE — TELEPHONE ENCOUNTER
Patient called requesting another option as far as medication  Patient states her migraines are severe & the Tylenol with Codeine isn't working   Patient has a virtual appt tomorrow 04/15/2021

## 2021-04-14 NOTE — TELEPHONE ENCOUNTER
Please tell patient that I will add Toradol 10 mg oral pill that she can take every 6 hours as needed for the migraine headache  She can alternate between that and the Tylenol with codeine every 4-6 hours if needed  I did send it to pharmacy already  Please inform patient that to take the Toradol which is an anti-inflammatory, she needs to take it with food so it does not cause more stomach issues  I know she has a follow-up appointment with me virtually tomorrow however because of her COVID infection,  I cannot see her in person making it very difficult to treat further  If she feels as though her GI symptoms and her migraines are getting worse and severe in nature I would recommend she go to the emergency room for further treatment  She may benefit from a Toradol injection, IV fluids and IV medication more than what I could provide her

## 2021-04-15 ENCOUNTER — TELEMEDICINE (OUTPATIENT)
Dept: INTERNAL MEDICINE CLINIC | Facility: CLINIC | Age: 51
End: 2021-04-15

## 2021-04-15 DIAGNOSIS — U07.1 COVID-19 VIRUS INFECTION: Primary | ICD-10-CM

## 2021-04-15 DIAGNOSIS — IMO0002 CHRONIC MIGRAINE: ICD-10-CM

## 2021-04-15 DIAGNOSIS — M43.6 NECK STIFFNESS: ICD-10-CM

## 2021-04-15 PROCEDURE — 99214 OFFICE O/P EST MOD 30 MIN: CPT | Performed by: PHYSICIAN ASSISTANT

## 2021-04-15 RX ORDER — CYCLOBENZAPRINE HCL 5 MG
5 TABLET ORAL 3 TIMES DAILY PRN
Qty: 30 TABLET | Refills: 0 | Status: SHIPPED | OUTPATIENT
Start: 2021-04-15 | End: 2021-04-16

## 2021-04-15 NOTE — TELEPHONE ENCOUNTER
Inform patient about the medication ,per patient she try the medication and she feel is not working

## 2021-04-15 NOTE — PROGRESS NOTES
COVID-19 Outpatient Progress Note    Assessment/Plan:    Problem List Items Addressed This Visit        Other    COVID-19 virus infection - Primary      Other Visit Diagnoses     Neck stiffness        Relevant Medications    cyclobenzaprine (FLEXERIL) 5 mg tablet    Chronic migraine        Relevant Medications    cyclobenzaprine (FLEXERIL) 5 mg tablet         Disposition:     I recommended continued isolation until at least 24 hours have passed since recovery defined as resolution of fever without the use of fever-reducing medications AND improvement in COVID symptoms AND 10 days have passed since onset of symptoms (or 10 days have passed since date of first positive viral diagnostic test for asymptomatic patients)  Patient is a 22-year-old female presenting today for another COVID follow-up  Last telemedicine visit we were concerned regarding a persistent migraine which patient states that she has had migraine issues for years but I would did not see a diagnosis in her problem list and she states that she has not really talked about it with her resident PCP  Unfortunately I feel as though her COVID infection may have exacerbated her migraines which is making it very difficult to control  Patient states that she did not get any relief with the Tylenol with codeine I gave her for the migraine, which I prescribed to avoid NSAIDs secondary to her nausea and abdominal pain that she had as well  Fortunately patient states that the abdominal pain has significantly improved, has some minimal nausea though no vomiting  She states overall her headache has slightly lessened in severity but is persistent  I did prescribe Toradol yesterday after patient had called stating the Tylenol with codeine was not helping however patient states that she did not know this and her daughter never picked it up from the pharmacy      It does seem that she has some neck pain and stiffness as well so I do question a tension component so I will trial her in addition to the Toradol on Flexeril 5 mg t i d  P r n   I advised he continue warm compresses to her neck and should perform some gentle neck stretches to loosen the neck muscles as well  I did advised patient caution with Toradol use as it may cause GI side effects and encouraged her to take with food  From an abortive treatment standpoint I doubt Triptan work at this point being that she has had this persistent migraine headache for while now through the course of her COVID infection but can consider NSAIDs and the Flexeril are not beneficial   Also can consider steroid  Otherwise patient seems to be doing well  Patient with a positive COVID test on 04/06 and I believe it should be fine to complete a 10 day isolation with last day of isolation expected tomorrow, 4/16/21  We will plan a follow-up virtually with patient on Monday to check in regarding her headaches and discuss a plan to return back to work  Patient agrees with plan  She understands any worsening symptoms she needs to go to the ED especially over the weekend when our office is closed  She understands that she should start the 2 new medications for her migraines, plenty of rest, fluids for hydration, Zofran may be continued for nausea control if needed, moist heat or heating pad and stretches for her neck  I have spent 20 minutes directly with the patient  Greater than 50% of this time was spent in counseling/coordination of care regarding: diagnostic results, prognosis, risks and benefits of treatment options, instructions for management, patient and family education, importance of treatment compliance and impressions          Encounter provider Ranjan Roy PA-C    Provider located at [address] [place] [address] [address]  [address] [place] [id number] [phone]    Recent Visits  Date Type Provider Dept   04/14/21 Telephone [facility], DO [address]   04/13/21 Telemedicine Kriss Osullivan, [address] [place]   04/12/21 Telemedicine Winnie Levy, DO [address] [place]   04/09/21 Telephone [facility], [address]   04/08/21 Telemedicine Krysten Starr, [address] [facility] recent visits within past 7 days and meeting all other requirements     Today's Visits  Date Type Provider Dept   04/15/21 [address], [address] [facility] today's visits and meeting all other requirements     Future Appointments  No visits were found meeting these conditions  Showing future appointments within next 150 days and meeting all other requirements      This virtual check-in was done via [employer] and patient was informed that this is a secure, HIPAA-compliant platform  She agrees to proceed  Patient agrees to participate in a virtual check in via telephone or video visit instead of presenting to the office to address urgent/immediate medical needs  Patient is aware this is a billable service  After connecting through [facility], the patient was identified by name and date of birth  Shaila Frias was informed that this was a telemedicine visit and that the exam was being conducted confidentially over secure lines  My office door was closed  No one else was in the room  Shaila Frias acknowledged consent and understanding of privacy and security of the telemedicine visit  I informed the patient that I have reviewed her record in Epic and presented the opportunity for her to ask any questions regarding the visit today  The patient agreed to participate  Subjective:   Shaila Frias is a 46 y o  female who has been screened for COVID-19  Symptom change since last report: improving   Patient's symptoms include nasal congestion (slight, improved), cough (slight, dry), abdominal pain, nausea, myalgias (neck discomfort) and headache (states less severe than before but still has headache  )  Patient denies fever, chills, fatigue, rhinorrhea, sore throat, anosmia, loss of taste, shortness of breath, chest tightness, vomiting and diarrhea  Hannah Sanchez has been staying home and has isolated themselves in her home  She is taking care to not share personal items and is cleaning all surfaces that are touched often, like counters, tabletops, and doorknobs using household cleaning sprays or wipes  She is wearing a mask when she leaves her room  Date of symptom onset: 2021  Date of positive COVID-19 PCR: 2021    States H/A is constant  states "it is driving me crazy"  She tried tylenol with codeine for the headache pain and "it didn't do anything"  States last night headache got a little better  Does also have some neck pain, reports pain coming up from neck into her entire top of head  Stiff neck with bending cervical spine  Abdominal better is improved but not resolved  Still periumbilical   Still with some nausea, but no vomiting  Denies diarrhea or blood in stool  States she is a little constipated, but she dd not start bentyl for the abd pain/cramping  Lab Results   Component Value Date    SARSCOV2 Positive (A) 2021     Past Medical History:   Diagnosis Date    Abnormal Pap smear of cervix     HPV and PAP smear 2020 normal     Anxiety     Anxiety and depression     no longer on meds    Breast lump     hx of right breast lump in lumpectomy in     Depression     Migraine     Nicotine dependence     currently smoker, 33 pack year hx     Past Surgical History:   Procedure Laterality Date    BREAST EXCISIONAL BIOPSY Right     excisional- done in [address] [place] [address] LUMPECTOMY Right     benign     SECTION      x's 4    DILATION AND CURETTAGE, DIAGNOSTIC / THERAPEUTIC      pre cancerous cells?     MAMMO (HISTORICAL)  2018    MULTIPLE TOOTH EXTRACTIONS      X2    TUBAL LIGATION       Current Outpatient Medications Medication Sig Dispense Refill    acetaminophen-codeine (TYLENOL with CODEINE #3) 300-30 MG per tablet Take 1 tablet by mouth every 6 (six) hours as needed (migraine) 8 tablet 0    dicyclomine (BENTYL) 20 mg tablet Take 1 tablet (20 mg total) by mouth every 6 (six) hours as needed (abdominal pain) 30 tablet 0    ketorolac (TORADOL) 10 mg tablet Take 1 tablet (10 mg total) by mouth every 6 (six) hours as needed for severe pain (headache) 10 tablet 0    ondansetron (ZOFRAN-ODT) 8 mg disintegrating tablet Take 1 tablet (8 mg total) by mouth every 8 (eight) hours as needed for nausea or vomiting 30 tablet 0    buPROPion (WELLBUTRIN XL) 150 mg 24 hr tablet Take one tablet by mouth daily for the first three days followed by twice a day afterward (Patient not taking: Reported on 4/8/2021) 30 tablet 5    cyclobenzaprine (FLEXERIL) 5 mg tablet Take 1 tablet (5 mg total) by mouth 3 (three) times a day as needed for muscle spasms (neck stiffness, headache) 30 tablet 0    nicotine polacrilex (COMMIT) 2 MG lozenge Apply 1 lozenge (2 mg total) to the mouth or throat as needed for smoking cessation (Patient not taking: Reported on 4/8/2021) 100 each 1    polyethylene glycol (Golytely) 4000 mL solution Take 4,000 mL by mouth once for 1 dose (Patient not taking: Reported on 3/31/2021) 4000 mL 0     No current facility-administered medications for this visit  No Known Allergies    Review of Systems   Constitutional: Negative for chills, fatigue and fever  HENT: Positive for congestion (slight, improved)  Negative for rhinorrhea and sore throat  Respiratory: Positive for cough (slight, dry)  Negative for chest tightness and shortness of breath  Gastrointestinal: Positive for abdominal pain and nausea  Negative for diarrhea and vomiting  Musculoskeletal: Positive for myalgias (neck discomfort)  Neurological: Positive for headaches (states less severe than before but still has headache  )  Objective:    Vitals:       Physical Exam  Vitals signs reviewed  Constitutional:       General: She is not in acute distress  Appearance: She is ill-appearing (mild)  She is not toxic-appearing  HENT:      Head: Normocephalic and atraumatic  Right Ear: External ear normal       Left Ear: External ear normal    Eyes:      Conjunctiva/sclera: Conjunctivae normal    Neck:      Musculoskeletal: Pain with movement (mild with flexion and extension) present  Pulmonary:      Effort: Pulmonary effort is normal    Abdominal:      Comments: Unable to examine   Neurological:      Mental Status: She is alert and oriented to person, place, and time  Psychiatric:         Mood and Affect: Mood normal          Speech: Speech normal          Behavior: Behavior normal  Behavior is cooperative  VIRTUAL VISIT DISCLAIMER    Jorge Garcia acknowledges that she has consented to an online visit or consultation  She understands that the online visit is based solely on information provided by her, and that, in the absence of a face-to-face physical evaluation by the physician, the diagnosis she receives is both limited and provisional in terms of accuracy and completeness  This is not intended to replace a full medical face-to-face evaluation by the physician  Jorge Garcia understands and accepts these terms

## 2021-04-16 ENCOUNTER — HOSPITAL ENCOUNTER (EMERGENCY)
Facility: HOSPITAL | Age: 51
Discharge: HOME | End: 2021-04-16
Attending: EMERGENCY MEDICINE | Admitting: EMERGENCY MEDICINE
Payer: COMMERCIAL

## 2021-04-16 ENCOUNTER — TELEPHONE (OUTPATIENT)
Dept: INTERNAL MEDICINE CLINIC | Facility: CLINIC | Age: 51
End: 2021-04-16

## 2021-04-16 VITALS
OXYGEN SATURATION: 99 % | RESPIRATION RATE: 18 BRPM | BODY MASS INDEX: 17.37 KG/M2 | HEIGHT: 61 IN | SYSTOLIC BLOOD PRESSURE: 111 MMHG | WEIGHT: 92 LBS | HEART RATE: 86 BPM | TEMPERATURE: 98 F | DIASTOLIC BLOOD PRESSURE: 59 MMHG

## 2021-04-16 DIAGNOSIS — U07.1 COVID-19: ICD-10-CM

## 2021-04-16 DIAGNOSIS — R51.9 HEADACHE: Primary | ICD-10-CM

## 2021-04-16 PROCEDURE — 99284 EMERGENCY DEPT VISIT MOD MDM: CPT

## 2021-04-16 PROCEDURE — 99284 EMERGENCY DEPT VISIT MOD MDM: CPT | Performed by: EMERGENCY MEDICINE

## 2021-04-16 PROCEDURE — 96375 TX/PRO/DX INJ NEW DRUG ADDON: CPT

## 2021-04-16 PROCEDURE — 96374 THER/PROPH/DIAG INJ IV PUSH: CPT

## 2021-04-16 PROCEDURE — 96361 HYDRATE IV INFUSION ADD-ON: CPT

## 2021-04-16 RX ORDER — METOCLOPRAMIDE 10 MG/1
10 TABLET ORAL EVERY 6 HOURS PRN
Qty: 10 TABLET | Refills: 0 | Status: SHIPPED | OUTPATIENT
Start: 2021-04-16 | End: 2021-04-18

## 2021-04-16 RX ORDER — METOCLOPRAMIDE HYDROCHLORIDE 5 MG/ML
10 INJECTION INTRAMUSCULAR; INTRAVENOUS ONCE
Status: COMPLETED | OUTPATIENT
Start: 2021-04-16 | End: 2021-04-16

## 2021-04-16 RX ORDER — KETOROLAC TROMETHAMINE 30 MG/ML
15 INJECTION, SOLUTION INTRAMUSCULAR; INTRAVENOUS ONCE
Status: COMPLETED | OUTPATIENT
Start: 2021-04-16 | End: 2021-04-16

## 2021-04-16 RX ADMIN — SODIUM CHLORIDE 1000 ML: 0.9 INJECTION, SOLUTION INTRAVENOUS at 17:56

## 2021-04-16 RX ADMIN — KETOROLAC TROMETHAMINE 15 MG: 30 INJECTION, SOLUTION INTRAMUSCULAR at 17:56

## 2021-04-16 RX ADMIN — METOCLOPRAMIDE HYDROCHLORIDE 10 MG: 5 INJECTION INTRAMUSCULAR; INTRAVENOUS at 17:56

## 2021-04-16 NOTE — TELEPHONE ENCOUNTER
Patient had a virtual appt & is due "Return in about 4 days (around 4/19/2021) for Recheck - migraine H/A, neck pain and GI sxs, post covid " I called patient to schedule appt I left a voicemail for a call back

## 2021-04-16 NOTE — DISCHARGE INSTRUCTIONS
Tylenol and ibuprofen as needed for headache  You may also take prescribed reglan  Return to the emergency department for severe worsening of pain, persistent vomiting with inability to drink, fainting, numbness or weakness in your arms or legs, changes in your vision, persistent high fever, any other new or concerning symptoms

## 2021-04-17 NOTE — ED PROVIDER NOTES
History  Chief Complaint   Patient presents with    Headache     Patient is COVID positive since   Has been having headache since Monday (5 days ago)  HPI  47 yo female with PMH migraines presents to the ED with concern for headache x 5 days  Pt describes left sided posterior headache that has been constant and is associated with nausea, vomiting, photophobia, occasional dizziness/lightheadedness  Pt states these sxs are similar to her previous migraines, but in the past her migraines usually only last a few hours  No relief with tylenol or tylenol with codeine at home  Pt reports she has had to come to the ED a few times for headaches in the past and typically has relief with IV medications  Pt notes she recently tested positive for COVID on , but reports her symptoms have been improving and she only has some mild cough and congestion now  No recent fevers, chills, chest pain, SOB,numbness, weakness, neck pain/stiffness, abdominal pain  None       Past Medical History:   Diagnosis Date    Abnormal Pap smear of cervix     HPV and PAP smear 2020 normal     Anxiety     Anxiety and depression     no longer on meds    Breast lump     hx of right breast lump in lumpectomy in     Depression     Migraine     Nicotine dependence     currently smoker, 33 pack year hx       Past Surgical History:   Procedure Laterality Date    BREAST EXCISIONAL BIOPSY Right     excisional- done in [address] [place] [address] LUMPECTOMY Right     benign     SECTION      x's 4    DILATION AND CURETTAGE, DIAGNOSTIC / THERAPEUTIC      pre cancerous cells?     MAMMO (HISTORICAL)  2018    MULTIPLE TOOTH EXTRACTIONS      X2    TUBAL LIGATION         Family History   Problem Relation Age of Onset    No Known Problems Daughter     No Known Problems Son     No Known Problems Son     No Known Problems Son     Hypertension Mother     Aneurysm Mother     No Known Problems Father     No Known Problems Sister     No Known Problems Maternal Grandmother     No Known Problems Maternal Grandfather     No Known Problems Paternal Grandmother     No Known Problems Paternal Grandfather     Stroke Brother     No Known Problems Brother     No Known Problems Maternal Aunt      I have reviewed and agree with the history as documented  E-Cigarette/Vaping    E-Cigarette Use Current Some Day User      E-Cigarette/Vaping Substances    Nicotine Yes     THC No     CBD No     Flavoring No     Other No     Unknown No      Social History     Tobacco Use    Smoking status: Current Every Day Smoker     Packs/day: 1 00     Years: 33 00     Pack years: 33 00     Types: Cigarettes    Smokeless tobacco: Never Used   Substance Use Topics    Alcohol use: Not Currently     Frequency: Never    Drug use: Never        Review of Systems   Constitutional: Negative for chills and fever  HENT: Positive for congestion  Negative for rhinorrhea and sore throat  Eyes: Positive for photophobia  Respiratory: Positive for cough  Negative for chest tightness and shortness of breath  Cardiovascular: Negative for chest pain  Gastrointestinal: Positive for nausea and vomiting  Negative for abdominal pain and diarrhea  Genitourinary: Negative for dysuria and hematuria  Musculoskeletal: Negative for arthralgias and myalgias  Skin: Negative for rash  Neurological: Positive for dizziness, light-headedness and headaches  Negative for syncope, weakness and numbness  All other systems reviewed and are negative        Physical Exam  ED Triage Vitals   Temperature Pulse Respirations Blood Pressure SpO2   04/16/21 1731 04/16/21 1736 04/16/21 1731 04/16/21 1731 04/16/21 1736   98 °F (36 7 °C) 60 16 123/62 99 %      Temp Source Heart Rate Source Patient Position - Orthostatic VS BP Location FiO2 (%)   04/16/21 1731 04/16/21 1731 04/16/21 1731 04/16/21 1731 --   Oral Monitor Lying Right arm       Pain Score       04/16/21 1731 Worst Possible Pain             Orthostatic Vital Signs  Vitals:    04/16/21 1731 04/16/21 1736 04/16/21 1902   BP: 123/62  111/59   Pulse:  60 86   Patient Position - Orthostatic VS: Lying  Lying       Physical Exam  Vitals signs and nursing note reviewed  Constitutional:       General: She is not in acute distress  Appearance: She is well-developed  She is not diaphoretic  HENT:      Head: Normocephalic and atraumatic  Right Ear: External ear normal       Left Ear: External ear normal       Nose: Nose normal       Mouth/Throat:      Mouth: Mucous membranes are moist    Eyes:      Conjunctiva/sclera: Conjunctivae normal       Pupils: Pupils are equal, round, and reactive to light  Neck:      Musculoskeletal: Full passive range of motion without pain, normal range of motion and neck supple  No neck rigidity  Trachea: Trachea normal    Cardiovascular:      Rate and Rhythm: Normal rate and regular rhythm  Heart sounds: Normal heart sounds  No murmur  No friction rub  No gallop  Pulmonary:      Effort: Pulmonary effort is normal  No respiratory distress  Breath sounds: Normal breath sounds  No wheezing, rhonchi or rales  Abdominal:      General: Bowel sounds are normal  There is no distension  Palpations: Abdomen is soft  There is no mass  Tenderness: There is no abdominal tenderness  There is no guarding or rebound  Musculoskeletal: Normal range of motion  Lymphadenopathy:      Cervical: No cervical adenopathy  Skin:     General: Skin is warm and dry  Neurological:      General: No focal deficit present  Mental Status: She is alert and oriented to person, place, and time  Cranial Nerves: Cranial nerves are intact  No cranial nerve deficit  Sensory: Sensation is intact  No sensory deficit  Motor: Motor function is intact  No weakness  Coordination: Coordination is intact  Finger-Nose-Finger Test normal       Gait: Gait is intact           ED Medications  Medications   sodium chloride 0 9 % bolus 1,000 mL (0 mL Intravenous Stopped 4/16/21 1901)   ketorolac (TORADOL) injection 15 mg (15 mg Intravenous Given 4/16/21 1756)   metoclopramide (REGLAN) injection 10 mg (10 mg Intravenous Given 4/16/21 1756)       Diagnostic Studies  Results Reviewed     None                 No orders to display         Procedures  Procedures      ED Course  ED Course as of Apr 17 1059   Fri Apr 16, 2021   1925 Headache improved, pt ready to go home  Will discharge with strict return precautions and PCP follow up  SBIRT 20yo+      Most Recent Value   SBIRT (24 yo +)   In order to provide better care to our patients, we are screening all of our patients for alcohol and drug use  Would it be okay to ask you these screening questions? No Filed at: 04/16/2021 1755                MDM  45 yo female with PMH migraines presenting with headache similar to her previous migraines but lasting longer than usual  No focal neuro deficits or concerning features  Will treat with IV toradol, reglan, IV fluids, and reassess  If sxs improved pt can likely be discharged with strict return precautions and PCP follow up  Disposition  Final diagnoses:   Headache   COVID-19     Time reflects when diagnosis was documented in both MDM as applicable and the Disposition within this note     Time User Action Codes Description Comment    4/16/2021  7:26 PM Gisel Camejo [R51 9] Headache     4/16/2021  7:26 PM Cuong Rocha Add [U07 1] COVID-19       ED Disposition     ED Disposition Condition Date/Time Comment    Discharge Stable Fri Apr 16, 2021  7:26 PM Joselin Arthur discharge to home/self care              Follow-up Information     Follow up With Specialties Details Why [address] [facility] Schedule an appointment as soon as possible for a visit   Wendy  [address] [address] [place] [address]  [facility] [address], [address] [address], [place], [place], [place], [address] [phone]          Discharge Medication List as of 4/16/2021  7:29 PM      START taking these medications    Details   metoclopramide (REGLAN) 10 mg tablet Take 1 tablet (10 mg total) by mouth every 6 (six) hours as needed (nausea and vomiting), Starting Fri 4/16/2021, Normal           No discharge procedures on file  PDMP Review     None           ED Provider  Attending physically available and evaluated Jeff Guidry  MICHAEL managed the patient along with the ED Attending      Electronically Signed by         Luis Ramon MD  04/17/21 6851

## 2021-04-18 ENCOUNTER — APPOINTMENT (EMERGENCY)
Dept: RADIOLOGY | Facility: HOSPITAL | Age: 51
End: 2021-04-18
Payer: COMMERCIAL

## 2021-04-18 ENCOUNTER — HOSPITAL ENCOUNTER (EMERGENCY)
Facility: HOSPITAL | Age: 51
Discharge: HOME | End: 2021-04-18
Attending: EMERGENCY MEDICINE
Payer: COMMERCIAL

## 2021-04-18 VITALS
SYSTOLIC BLOOD PRESSURE: 144 MMHG | TEMPERATURE: 97.9 F | HEIGHT: 61 IN | HEART RATE: 60 BPM | BODY MASS INDEX: 17.38 KG/M2 | DIASTOLIC BLOOD PRESSURE: 80 MMHG | RESPIRATION RATE: 18 BRPM | OXYGEN SATURATION: 98 %

## 2021-04-18 DIAGNOSIS — R93.0 ABNORMAL CT SCAN OF HEAD: ICD-10-CM

## 2021-04-18 DIAGNOSIS — R51.9 HEADACHE: Primary | ICD-10-CM

## 2021-04-18 LAB
ANION GAP SERPL CALCULATED.3IONS-SCNC: 2 MMOL/L (ref 4–13)
BUN SERPL-MCNC: 9 MG/DL (ref 5–25)
CALCIUM SERPL-MCNC: 9.2 MG/DL (ref 8.3–10.1)
CHLORIDE SERPL-SCNC: 109 MMOL/L (ref 100–108)
CO2 SERPL-SCNC: 30 MMOL/L (ref 21–32)
CREAT SERPL-MCNC: 0.67 MG/DL (ref 0.6–1.3)
GFR SERPL CREATININE-BSD FRML MDRD: 102 ML/MIN/1.73SQ M
GLUCOSE SERPL-MCNC: 91 MG/DL (ref 65–140)
POTASSIUM SERPL-SCNC: 4 MMOL/L (ref 3.5–5.3)
SODIUM SERPL-SCNC: 141 MMOL/L (ref 136–145)

## 2021-04-18 PROCEDURE — 80048 BASIC METABOLIC PNL TOTAL CA: CPT | Performed by: EMERGENCY MEDICINE

## 2021-04-18 PROCEDURE — 70496 CT ANGIOGRAPHY HEAD: CPT

## 2021-04-18 PROCEDURE — 96374 THER/PROPH/DIAG INJ IV PUSH: CPT

## 2021-04-18 PROCEDURE — 99284 EMERGENCY DEPT VISIT MOD MDM: CPT

## 2021-04-18 PROCEDURE — 99284 EMERGENCY DEPT VISIT MOD MDM: CPT | Performed by: EMERGENCY MEDICINE

## 2021-04-18 PROCEDURE — 96375 TX/PRO/DX INJ NEW DRUG ADDON: CPT

## 2021-04-18 PROCEDURE — 36415 COLL VENOUS BLD VENIPUNCTURE: CPT | Performed by: EMERGENCY MEDICINE

## 2021-04-18 PROCEDURE — 96361 HYDRATE IV INFUSION ADD-ON: CPT

## 2021-04-18 RX ORDER — METOCLOPRAMIDE HYDROCHLORIDE 5 MG/ML
10 INJECTION INTRAMUSCULAR; INTRAVENOUS ONCE
Status: COMPLETED | OUTPATIENT
Start: 2021-04-18 | End: 2021-04-18

## 2021-04-18 RX ORDER — OLANZAPINE 5 MG/1
5 TABLET, ORALLY DISINTEGRATING ORAL ONCE
Status: COMPLETED | OUTPATIENT
Start: 2021-04-18 | End: 2021-04-18

## 2021-04-18 RX ORDER — KETOROLAC TROMETHAMINE 30 MG/ML
15 INJECTION, SOLUTION INTRAMUSCULAR; INTRAVENOUS ONCE
Status: COMPLETED | OUTPATIENT
Start: 2021-04-18 | End: 2021-04-18

## 2021-04-18 RX ADMIN — METOCLOPRAMIDE HYDROCHLORIDE 10 MG: 5 INJECTION INTRAMUSCULAR; INTRAVENOUS at 15:10

## 2021-04-18 RX ADMIN — SODIUM CHLORIDE 1000 ML: 0.9 INJECTION, SOLUTION INTRAVENOUS at 15:07

## 2021-04-18 RX ADMIN — KETOROLAC TROMETHAMINE 15 MG: 30 INJECTION, SOLUTION INTRAMUSCULAR at 15:07

## 2021-04-18 RX ADMIN — IOHEXOL 85 ML: 350 INJECTION, SOLUTION INTRAVENOUS at 16:26

## 2021-04-18 RX ADMIN — OLANZAPINE 5 MG: 5 TABLET, ORALLY DISINTEGRATING ORAL at 15:06

## 2021-04-18 NOTE — ED PROVIDER NOTES
History  Chief Complaint   Patient presents with    Headache - Recurrent or Known Dx Migraines     Pt is covid +, reports she has had an intermittient headache on the right side of her head since two Mondays ago  Patient was just seen for same headache, reports it initially went away but is now back  Patient reports sensitivty to light, reports nausea  Denies falls/head trauma      The patient is a 55-year-old female with past medical history of migraine headaches who presents the ED for evaluation of headache  She was seen in the emergency department 2 days ago for headache, states that after heavy medications she felt improved and her headache resolved  Headache at that time had started on Monday, gradual in onset  She states that after treatment on Friday, the headache returned yesterday  She states it was gradual in onset, right frontal in location, associated with nausea and photophobia  She states he feels similar to prior headaches but is worse  It was not maximal in onset, onset did not coincide with exertion  She denies any neck pain or stiffness, no vision changes, no weakness, numbness, no paresthesia  She denies any fever chills  COVID-19 infection earlier this month but denies any cough or shortness of breath, no other URI symptoms  She has tried Tylenol and Tylenol 3 at home, did not alleviate her symptoms        History provided by:  Patient   used: No    Headache - Recurrent or Known Dx Migraines  Pain location:  Frontal  Radiates to:  Does not radiate  Onset quality:  Gradual  Timing:  Constant  Progression:  Worsening  Chronicity:  Recurrent  Similar to prior headaches: yes    Relieved by:  Nothing  Worsened by:  Light  Ineffective treatments:  Acetaminophen  Associated symptoms: nausea and photophobia    Associated symptoms: no abdominal pain, no blurred vision, no cough, no dizziness, no eye pain, no fever, no focal weakness, no neck pain, no neck stiffness, no numbness, no paresthesias, no syncope, no vomiting and no weakness        None       Past Medical History:   Diagnosis Date    Abnormal Pap smear of cervix     HPV and PAP smear 2020 normal     Anxiety     Anxiety and depression     no longer on meds    Breast lump     hx of right breast lump in lumpectomy in     Depression     Migraine     Nicotine dependence     currently smoker, 33 pack year hx       Past Surgical History:   Procedure Laterality Date    BREAST EXCISIONAL BIOPSY Right 2014    excisional- done in [address] [place] [address] LUMPECTOMY Right 2014    benign     SECTION      x's 4    DILATION AND CURETTAGE, DIAGNOSTIC / THERAPEUTIC      pre cancerous cells?  MAMMO (HISTORICAL)  2018    MULTIPLE TOOTH EXTRACTIONS      X2    TUBAL LIGATION         Family History   Problem Relation Age of Onset    No Known Problems Daughter     No Known Problems Son     No Known Problems Son     No Known Problems Son     Hypertension Mother     Aneurysm Mother     No Known Problems Father     No Known Problems Sister     No Known Problems Maternal Grandmother     No Known Problems Maternal Grandfather     No Known Problems Paternal Grandmother     No Known Problems Paternal Grandfather     Stroke Brother     No Known Problems Brother     No Known Problems Maternal Aunt      I have reviewed and agree with the history as documented  E-Cigarette/Vaping    E-Cigarette Use Current Some Day User      E-Cigarette/Vaping Substances    Nicotine Yes     THC No     CBD No     Flavoring No     Other No     Unknown No      Social History     Tobacco Use    Smoking status: Current Every Day Smoker     Packs/day: 1 00     Years: 33 00     Pack years: 33 00     Types: Cigarettes    Smokeless tobacco: Never Used   Substance Use Topics    Alcohol use: Not Currently     Frequency: Never    Drug use: Never        Review of Systems   Constitutional: Negative    Negative for chills and fever    HENT: Negative  Eyes: Positive for photophobia  Negative for blurred vision, pain and visual disturbance  Respiratory: Negative  Negative for cough and shortness of breath  Cardiovascular: Negative  Negative for chest pain and syncope  Gastrointestinal: Positive for nausea  Negative for abdominal pain and vomiting  Endocrine: Negative  Genitourinary: Negative  Musculoskeletal: Negative  Negative for neck pain and neck stiffness  Skin: Negative  Allergic/Immunologic: Negative  Neurological: Positive for headaches  Negative for dizziness, focal weakness, syncope, facial asymmetry, weakness, numbness and paresthesias  Hematological: Negative  Psychiatric/Behavioral: Negative  Physical Exam  ED Triage Vitals [04/18/21 1234]   Temperature Pulse Respirations Blood Pressure SpO2   97 9 °F (36 6 °C) 75 18 111/78 98 %      Temp Source Heart Rate Source Patient Position - Orthostatic VS BP Location FiO2 (%)   Oral Monitor Sitting Right arm --      Pain Score       Worst Possible Pain             Orthostatic Vital Signs  Vitals:    04/18/21 1234 04/18/21 1510 04/18/21 1730   BP: 111/78 151/76 144/80   Pulse: 75 67 60   Patient Position - Orthostatic VS: Sitting Lying Lying       Physical Exam  Vitals signs reviewed  Constitutional:       Appearance: She is well-developed  She is not diaphoretic  Comments: Patient crying   HENT:      Head: Normocephalic and atraumatic  Right Ear: External ear normal       Left Ear: External ear normal       Nose: Nose normal    Eyes:      General: No scleral icterus  Extraocular Movements: Extraocular movements intact  Conjunctiva/sclera: Conjunctivae normal       Pupils: Pupils are equal, round, and reactive to light  Neck:      Musculoskeletal: Normal range of motion and neck supple  No neck rigidity  Vascular: No JVD  Trachea: No tracheal deviation     Cardiovascular:      Rate and Rhythm: Normal rate and regular rhythm  Heart sounds: Normal heart sounds  No murmur  Pulmonary:      Effort: Pulmonary effort is normal  No respiratory distress  Breath sounds: Normal breath sounds  Abdominal:      General: Abdomen is flat  Bowel sounds are normal  There is no distension  Palpations: Abdomen is soft  Tenderness: There is no abdominal tenderness  There is no guarding  Musculoskeletal: Normal range of motion  Skin:     General: Skin is warm and dry  Capillary Refill: Capillary refill takes less than 2 seconds  Neurological:      General: No focal deficit present  Mental Status: She is alert and oriented to person, place, and time  Motor: No abnormal muscle tone  Comments: Patient is alert and oriented to person, place, time, and situation  Speech is normal with no dysarthria, no aphasia  Cranial nerves II-XII intact  Sensation is intact bilaterally upper and lower extremities  Normal muscle tone, no clonus, no atrophy  5/5 muscle strength bilaterally upper extremities, 5/5 muscle strength bilaterally lower extremities  Psychiatric:         Mood and Affect: Affect is tearful           Behavior: Behavior normal          ED Medications  Medications   sodium chloride 0 9 % bolus 1,000 mL (0 mL Intravenous Stopped 4/18/21 1604)   OLANZapine (ZyPREXA ZYDIS) dispersible tablet 5 mg (5 mg Oral Given 4/18/21 1506)   ketorolac (TORADOL) injection 15 mg (15 mg Intravenous Given 4/18/21 1507)   metoclopramide (REGLAN) injection 10 mg (10 mg Intravenous Given 4/18/21 1510)   iohexol (OMNIPAQUE) 350 MG/ML injection (SINGLE-DOSE) 85 mL (85 mL Intravenous Given 4/18/21 1626)       Diagnostic Studies  Results Reviewed     Procedure Component Value Units Date/Time    Basic metabolic panel [[id number]]  (Abnormal) Collected: 04/18/21 1506    Lab Status: Final result Specimen: Blood from Arm, Right Updated: 04/18/21 1535     Sodium 141 mmol/L      Potassium 4 0 mmol/L      Chloride 109 mmol/L CO2 30 mmol/L      ANION GAP 2 mmol/L      BUN 9 mg/dL      Creatinine 0 67 mg/dL      Glucose 91 mg/dL      Calcium 9 2 mg/dL      eGFR 102 ml/min/1 73sq m     Narrative:      [place] guidelines for Chronic Kidney Disease (CKD):     Stage 1 with normal or high GFR (GFR > 90 mL/min/1 73 square meters)    Stage 2 Mild CKD (GFR = 60-89 mL/min/1 73 square meters)    Stage 3A Moderate CKD (GFR = 45-59 mL/min/1 73 square meters)    Stage 3B Moderate CKD (GFR = 30-44 mL/min/1 73 square meters)    Stage 4 Severe CKD (GFR = 15-29 mL/min/1 73 square meters)    Stage 5 End Stage CKD (GFR <15 mL/min/1 73 square meters)  Note: GFR calculation is accurate only with a steady state creatinine                 CTA head with and without contrast   Final Result by Dorothy Nicole DO (04/18 1710)      1  No evidence of cerebral venous thrombosis  2   Limited evaluation of the arterial structures on this CT venogram demonstrates a possible 2 mm focal superiorly directed outpouching from the anterior communicating artery  Nonemergent outpatient CT angiography is recommended to further evaluate  The study was marked in [facility] for immediate notification  Workstation performed: [id number]               Procedures  Procedures      ED Course  ED Course as of Apr 18 1759   Sun Apr 18, 2021   1535 Creatinine: 0 67                             SBIRT 22yo+      Most Recent Value   SBIRT (25 yo +)   In order to provide better care to our patients, we are screening all of our patients for alcohol and drug use  Would it be okay to ask you these screening questions? No Filed at: 04/18/2021 1443                [facility]  Number of Diagnoses or Management Options  Abnormal CT scan of head: new and requires workup  Headache: new and requires workup  Diagnosis management comments: 54-year-old female presenting to the ED for 2nd time this for headache    She has a normal exam, no meningismus, is afebrile  Unlikely to be cerebral venous thrombosis, however will obtain a CTA w/ venous delays   CT no venous thrombosis, outpouching anterior comm artery, needs neurosurgery follow up  Patient reevaluated, headache resolved  Will d/c home, f/u neurosurgery and neurology        Amount and/or Complexity of Data Reviewed  Clinical lab tests: ordered and reviewed  Tests in the radiology section of CPT®: ordered and reviewed  Review and summarize past medical records: yes  Independent visualization of images, tracings, or specimens: yes        Disposition  Final diagnoses:   Abnormal CT scan of head   Headache     Time reflects when diagnosis was documented in both MDM as applicable and the Disposition within this note     Time User Action Codes Description Comment    4/18/2021  5:30 PM Caterina Luis Add [R93 0] Abnormal CT scan of head     4/18/2021  5:30 PM Aisha Rim [R51 9] Headache     4/18/2021  5:30 PM Alina Rosalinda [R93 0] Abnormal CT scan of head     4/18/2021  5:30 PM Caterina Luis Modify [R51 9] Headache       ED Disposition     ED Disposition Condition Date/Time Comment    Discharge Stable Sun Apr 18, 2021  5:30 PM Brice Erps discharge to home/self care              Follow-up Information     Follow up With Specialties Details Why Contact Info Additional [address] Neurosurgery Schedule an appointment as soon as possible for a visit in 1 week  [address]  [facility] [address]  [facility], [address], [place], [address] Emergency Department Emergency Medicine Go to  As needed, If symptoms worsen [address]  [address] [facility] Emergency Department, [address], [facility]    [facility]   [address]  [address] [facility] [address], [address] [facility] [place], [place], [place], [address] [facility]          There are no discharge medications for this patient  No discharge procedures on file  PDMP Review     None           ED Provider  Attending physically available and evaluated Avila Baker I managed the patient along with the ED Attending      Electronically Signed by         Norma Westbrook DO  04/18/21 2252

## 2021-04-18 NOTE — ED ATTENDING ATTESTATION
4/18/2021  I, Roz Bhakta MD, saw and evaluated the patient  I have discussed the patient with the resident/non-physician practitioner and agree with the resident's/non-physician practitioner's findings, Plan of Care, and MDM as documented in the resident's/non-physician practitioner's note, except where noted  All available labs and Radiology studies were reviewed  I was present for key portions of any procedure(s) performed by the resident/non-physician practitioner and I was immediately available to provide assistance  At this point I agree with the current assessment done in the Emergency Department  I have conducted an independent evaluation of this patient a history and physical is as follows:    ED Course         Critical Care Time  Procedures    45 yo female with hx of migraine headaches, here for a headache since yesterday  Pt seen in ed two day ago for same and improved with meds  Pt had covid early April  Pt with no head trauma, no falls, no injury  Pt took tylenol with codeine with no relief  Vss, afebrile, lungs cta, rrr, abdomen soft nontender, no neuro deficits, anxious appearing  Ctv, migraine meds

## 2021-04-19 ENCOUNTER — TELEMEDICINE (OUTPATIENT)
Dept: INTERNAL MEDICINE CLINIC | Facility: CLINIC | Age: 51
End: 2021-04-19

## 2021-04-19 DIAGNOSIS — G43.001 MIGRAINE WITHOUT AURA AND WITH STATUS MIGRAINOSUS, NOT INTRACTABLE: Primary | ICD-10-CM

## 2021-04-19 DIAGNOSIS — M62.838 MUSCLE SPASM: ICD-10-CM

## 2021-04-19 PROCEDURE — 99214 OFFICE O/P EST MOD 30 MIN: CPT | Performed by: INTERNAL MEDICINE

## 2021-04-19 RX ORDER — CYCLOBENZAPRINE HCL 5 MG
5 TABLET ORAL 3 TIMES DAILY PRN
Qty: 30 TABLET | Refills: 0 | Status: SHIPPED | OUTPATIENT
Start: 2021-04-19 | End: 2021-07-22

## 2021-04-19 RX ORDER — KETOROLAC TROMETHAMINE 10 MG/1
10 TABLET, FILM COATED ORAL EVERY 6 HOURS PRN
Qty: 20 TABLET | Refills: 0 | Status: SHIPPED | OUTPATIENT
Start: 2021-04-19 | End: 2021-07-22

## 2021-04-19 NOTE — PROGRESS NOTES
Virtual Regular Visit    Assessment/Plan:  Migraine headache   - Patient seen on 04/15 for headaches and neck stiffness was given Flexeril and NSAID, Zofran for nausea  She presented to the ED on 04/18 with complain of frontal headaches nonradiating, progressively worsening, previously relieved with ibuprofen,  - CTA head that was significant for 2 mm focal superiorly directed outpouching from the anterior communicating artery  She was given a follow-up visit with Neurosurgery, also given Toradol and Reglan which relieved her symptoms   - She is complaining of frontal headaches and muscle spasm on her neck  No paresthesia, no numbness  no fever, chills, viral URI symptom  - Patient encouraged to restart her ibuprofen for migraine headaches but she insisted on Toradol  - Will order Toradol and Flexeril p r n   For muscle spasm  - Follow-up outpatient with Neurosurgery and Neurology        Problem List Items Addressed This Visit     None      Visit Diagnoses     Migraine without aura and with status migrainosus, not intractable    -  Primary    Relevant Medications    ketorolac (TORADOL) 10 mg tablet    cyclobenzaprine (FLEXERIL) 5 mg tablet    Other Relevant Orders    Ambulatory referral to Neurology    Muscle spasm        Relevant Medications    cyclobenzaprine (FLEXERIL) 5 mg tablet               Reason for visit is   Chief Complaint   Patient presents with    Virtual Regular Visit        Encounter provider Drake Carter DO    Provider located at [id number] [place] [address] Road  [address] [place] [id number] [phone]      Recent Visits  Date Type Provider Dept   04/16/21 Telephone Scooter, [address]   04/15/21 [address] [place] [address], [address] [facility]   04/14/21 Telephone Scooter, [facility]   04/13/21 Telemedicine Joann Steiner, [address] [facility]   04/12/21 Telemedicine Winnie Arsh, [address] [facility] recent visits within past 7 days and meeting all other requirements     Future Appointments  No visits were found meeting these conditions  Showing future appointments within next 150 days and meeting all other requirements        The patient was identified by name and date of birth  Avila Baker was informed that this is a telemedicine visit and that the visit is being conducted through [facility] and patient was informed that this is a secure, HIPAA-compliant platform  She agrees to proceed     My office door was closed  No one else was in the room  She acknowledged consent and understanding of privacy and security of the video platform  The patient has agreed to participate and understands they can discontinue the visit at any time  Patient is aware this is a billable service  Subjective  Avila Baker is a 46 y o  female with a past medical history of migraine headaches, depression, COVID-19, nicotine abuse presents for virtual visit with complaint of neck muscle spasms and progressively worsening frontal headache, nonradiating, continuous, associated with nausea and photophobia, relieved by Tylenol and Reglan she received in the ED yesterday 4/18  Patient reports she was previously taking ibuprofen for her migraines which maximally helped  she ran out of ibuprofen and started taking Tylenol which seems to not have relieved her migraine headaches  She states she would like an order for Toradol which relieved her symptoms in the ED since she can get ibuprofen over-the-counter  She had a CTA in the  ED was significant for 2 mm focal superiorly directed outpouching from the anterior communicating artery, she was given referral to Neurosurgery in the ED  Patient was seen on camera, she looks stable on room air, she admits to resolution of her COVID symptoms and has no complaint other than the migraine headache she is having        HPI     Past Medical History:   Diagnosis Date    Abnormal Pap smear of cervix     HPV and PAP smear 2020 normal     Anxiety     Anxiety and depression     no longer on meds    Breast lump     hx of right breast lump in lumpectomy in     Depression     Migraine     Nicotine dependence     currently smoker, 33 pack year hx       Past Surgical History:   Procedure Laterality Date    BREAST EXCISIONAL BIOPSY Right     excisional- done in [address] [place] [address] LUMPECTOMY Right     benign     SECTION      x's 4    DILATION AND CURETTAGE, DIAGNOSTIC / THERAPEUTIC      pre cancerous cells?  MAMMO (HISTORICAL)  2018    MULTIPLE TOOTH EXTRACTIONS      X2    TUBAL LIGATION         Current Outpatient Medications   Medication Sig Dispense Refill    cyclobenzaprine (FLEXERIL) 5 mg tablet Take 1 tablet (5 mg total) by mouth 3 (three) times a day as needed for muscle spasms for up to 20 days 30 tablet 0    ketorolac (TORADOL) 10 mg tablet Take 1 tablet (10 mg total) by mouth every 6 (six) hours as needed for moderate pain for up to 20 days 20 tablet 0     No current facility-administered medications for this visit  No Known Allergies    Review of Systems 12 point review of system unremarkable except that listed in my HPI above    Video Exam    There were no vitals filed for this visit  Physical Exam  Constitutional:       Appearance: Normal appearance  Eyes:      Extraocular Movements: Extraocular movements intact  Neck:      Musculoskeletal: Normal range of motion  Skin:     Capillary Refill: Capillary refill takes less than 2 seconds  Neurological:      Mental Status: She is alert and oriented to person, place, and time  VIRTUAL VISIT DISCLAIMER    Eder Gonzalez acknowledges that she has consented to an online visit or consultation   She understands that the online visit is based solely on information provided by her, and that, in the absence of a face-to-face physical evaluation by the physician, the diagnosis she receives is both limited and provisional in terms of accuracy and completeness  This is not intended to replace a full medical face-to-face evaluation by the physician  Lor Frances understands and accepts these terms

## 2021-04-20 ENCOUNTER — TRANSCRIBE ORDERS (OUTPATIENT)
Dept: NEUROSURGERY | Facility: CLINIC | Age: 51
End: 2021-04-20

## 2021-04-20 DIAGNOSIS — R93.89 ABNORMAL COMPUTED TOMOGRAPHY ANGIOGRAPHY (CTA): Primary | ICD-10-CM

## 2021-04-20 NOTE — ED ATTENDING ATTESTATION
4/16/2021  IJason MD, saw and evaluated the patient  I have discussed the patient with the resident/non-physician practitioner and agree with the resident's/non-physician practitioner's findings, Plan of Care, and MDM as documented in the resident's/non-physician practitioner's note, except where noted  All available labs and Radiology studies were reviewed  I was present for key portions of any procedure(s) performed by the resident/non-physician practitioner and I was immediately available to provide assistance  At this point I agree with the current assessment done in the Emergency Department  I have conducted an independent evaluation of this patient a history and physical is as follows:    ED Course    patient is a 20-year-old female recently diagnosed with COVID-19 infection has had persistent headache for 5 days  Patient states that the symptoms she is currently experiencing are consistent with previous migraines she describes headache as left-sided posterior headache has been constant associated with nausea vomiting photophobia occasional dizziness lightheadedness  She has attempted to treat her symptoms at home with Tylenol and Tylenol 3 without any relief  She does state that she has had to come to the hospital in the past for IV medications to abort her migraine headaches  She denies any additional symptoms at this time period headache was gradual in onset not thunderclap  Vitals reviewed  No papilledema, neck is supple no meningismus  Cranial nerves grossly intact strength 5/5 normal speech normal gait no limb or truncal ataxia    Impression: Headache may be due to migraine exacerbated by concurrent viral illness or COVID-19 infection  I do not suspect a secondary headache disorder at this time  Will give patient trial of migraine cocktail  Reassess anticipate discharge home close follow-up PCP if patient's symptoms controlled        Critical Care Time  Procedures

## 2021-04-22 ENCOUNTER — TELEMEDICINE (OUTPATIENT)
Dept: INTERNAL MEDICINE CLINIC | Facility: CLINIC | Age: 51
End: 2021-04-22

## 2021-04-22 DIAGNOSIS — G43.809 OTHER MIGRAINE WITHOUT STATUS MIGRAINOSUS, NOT INTRACTABLE: Primary | ICD-10-CM

## 2021-04-22 PROBLEM — R51.9 HEADACHE: Status: RESOLVED | Noted: 2021-04-22 | Resolved: 2021-04-22

## 2021-04-22 PROBLEM — R51.9 HEADACHE: Status: ACTIVE | Noted: 2021-04-22

## 2021-04-22 PROBLEM — G43.909 MIGRAINE: Status: ACTIVE | Noted: 2021-04-22

## 2021-04-22 PROCEDURE — 99213 OFFICE O/P EST LOW 20 MIN: CPT | Performed by: INTERNAL MEDICINE

## 2021-04-22 NOTE — PROGRESS NOTES
Virtual Regular Visit      Assessment/Plan:    Problem List Items Addressed This Visit        Cardiovascular and Mediastinum    Migraine - Primary     · Patient states that headaches have significantly improved at this time  · Currently taking Flexeril and Toradol  · Has appoint with neuro surgery coming up on a tense  · No change in management at this time                    Reason for visit is   Chief Complaint   Patient presents with    COVID-19     headache, neck pain, loss of smell        Encounter provider Leyda Montaño DO    Provider located at [address] [place] [address] Road  [id number] [place] [address] 30  [address]  [address] [facility] [id number] [phone]      Recent Visits  Date Type Provider Dept   04/19/21 Telemedicine [facility], [address]   04/16/21 Telephone Scooter, [address]   04/15/21 [address] [facility] [place], [address] [place] [address] recent visits within past 7 days and meeting all other requirements     Today's Visits  Date Type Provider Dept   04/22/21 [address] [place], [address] [facility] today's visits and meeting all other requirements     Future Appointments  No visits were found meeting these conditions  Showing future appointments within next 150 days and meeting all other requirements        The patient was identified by name and date of birth  Lor Frances was informed that this is a telemedicine visit and that the visit is being conducted through [facility] and patient was informed that this is a secure, HIPAA-compliant platform  She agrees to proceed     My office door was closed  No one else was in the room  She acknowledged consent and understanding of privacy and security of the video platform  The patient has agreed to participate and understands they can discontinue the visit at any time  Patient is aware this is a billable service       Subjective  Lor Frances is a 46 y o  female for same day visit for headaches  Patient states that her headaches have significantly improved since her last visit  She has been taking the medications she was prescribed including Flexeril and Toradol  Patient also states she has an appoint with neuro surgery on 05/10 given her CT findings while in the emergency department  Patient had no other complaints on examination  Past Medical History:   Diagnosis Date    Abnormal Pap smear of cervix     HPV and PAP smear 2020 normal     Anxiety     Anxiety and depression     no longer on meds    Breast lump     hx of right breast lump in lumpectomy in     Depression     Migraine     Nicotine dependence     currently smoker, 33 pack year hx       Past Surgical History:   Procedure Laterality Date    BREAST EXCISIONAL BIOPSY Right     excisional- done in [address] [place] [address] LUMPECTOMY Right     benign     SECTION      x's 4    DILATION AND CURETTAGE, DIAGNOSTIC / THERAPEUTIC      pre cancerous cells?  MAMMO (HISTORICAL)  2018    MULTIPLE TOOTH EXTRACTIONS      X2    TUBAL LIGATION         Current Outpatient Medications   Medication Sig Dispense Refill    cyclobenzaprine (FLEXERIL) 5 mg tablet Take 1 tablet (5 mg total) by mouth 3 (three) times a day as needed for muscle spasms for up to 20 days 30 tablet 0    ketorolac (TORADOL) 10 mg tablet Take 1 tablet (10 mg total) by mouth every 6 (six) hours as needed for moderate pain for up to 20 days 20 tablet 0     No current facility-administered medications for this visit  No Known Allergies    Review of Systems   All other systems reviewed and are negative  Video Exam    Vitals:       Physical Exam  Constitutional:       General: She is not in acute distress  Neurological:      Mental Status: She is alert and oriented to person, place, and time            I spent 15 minutes directly with the patient during this visit      VIRTUAL VISIT DISCLAIMER    Juan C Jennifer acknowledges that she has consented to an online visit or consultation  She understands that the online visit is based solely on information provided by her, and that, in the absence of a face-to-face physical evaluation by the physician, the diagnosis she receives is both limited and provisional in terms of accuracy and completeness  This is not intended to replace a full medical face-to-face evaluation by the physician  Juan C Rodriguez understands and accepts these terms

## 2021-04-22 NOTE — ASSESSMENT & PLAN NOTE
· Patient states that headaches have significantly improved at this time  · Currently taking Flexeril and Toradol  · Has appoint with neuro surgery coming up on a tense  · No change in management at this time

## 2021-04-23 ENCOUNTER — APPOINTMENT (OUTPATIENT)
Dept: LAB | Facility: HOSPITAL | Age: 51
End: 2021-04-23
Payer: COMMERCIAL

## 2021-04-23 ENCOUNTER — HOSPITAL ENCOUNTER (OUTPATIENT)
Dept: RADIOLOGY | Facility: HOSPITAL | Age: 51
Discharge: HOME | End: 2021-04-23
Payer: COMMERCIAL

## 2021-04-23 DIAGNOSIS — Z11.4 SCREENING FOR HIV (HUMAN IMMUNODEFICIENCY VIRUS): ICD-10-CM

## 2021-04-23 DIAGNOSIS — R63.4 WEIGHT LOSS: ICD-10-CM

## 2021-04-23 DIAGNOSIS — F17.219 CIGARETTE NICOTINE DEPENDENCE WITH NICOTINE-INDUCED DISORDER: ICD-10-CM

## 2021-04-23 LAB — TSH SERPL DL<=0.05 MIU/L-ACNC: 1.14 UIU/ML (ref 0.36–3.74)

## 2021-04-23 PROCEDURE — 87389 HIV-1 AG W/HIV-1&-2 AB AG IA: CPT

## 2021-04-23 PROCEDURE — 36415 COLL VENOUS BLD VENIPUNCTURE: CPT

## 2021-04-23 PROCEDURE — 84443 ASSAY THYROID STIM HORMONE: CPT

## 2021-04-23 PROCEDURE — 71271 CT THORAX LUNG CANCER SCR C-: CPT

## 2021-04-25 LAB — HIV 1+2 AB+HIV1 P24 AG SERPL QL IA: NORMAL

## 2021-05-06 PROBLEM — F17.219 CIGARETTE NICOTINE DEPENDENCE WITH NICOTINE-INDUCED DISORDER: Status: ACTIVE | Noted: 2021-05-06

## 2021-05-06 PROBLEM — R63.4 WEIGHT LOSS: Status: ACTIVE | Noted: 2021-05-06

## 2021-05-07 ENCOUNTER — OFFICE VISIT (OUTPATIENT)
Dept: INTERNAL MEDICINE CLINIC | Facility: CLINIC | Age: 51
End: 2021-05-07

## 2021-05-07 VITALS
SYSTOLIC BLOOD PRESSURE: 104 MMHG | WEIGHT: 92 LBS | TEMPERATURE: 98.1 F | DIASTOLIC BLOOD PRESSURE: 52 MMHG | HEART RATE: 79 BPM | HEIGHT: 61 IN | BODY MASS INDEX: 17.37 KG/M2

## 2021-05-07 DIAGNOSIS — G43.719 INTRACTABLE CHRONIC MIGRAINE WITHOUT AURA AND WITHOUT STATUS MIGRAINOSUS: ICD-10-CM

## 2021-05-07 DIAGNOSIS — U07.1 COVID-19 VIRUS INFECTION: Primary | ICD-10-CM

## 2021-05-07 DIAGNOSIS — R22.2 NODULE OF SKIN OF BACK: ICD-10-CM

## 2021-05-07 DIAGNOSIS — F17.219 CIGARETTE NICOTINE DEPENDENCE WITH NICOTINE-INDUCED DISORDER: ICD-10-CM

## 2021-05-07 DIAGNOSIS — N95.1 HOT FLASHES DUE TO MENOPAUSE: ICD-10-CM

## 2021-05-07 DIAGNOSIS — Z13.31 DEPRESSION SCREENING: ICD-10-CM

## 2021-05-07 DIAGNOSIS — I67.1 ANTERIOR CEREBRAL ARTERY ANEURYSM: ICD-10-CM

## 2021-05-07 DIAGNOSIS — N39.0 URINARY TRACT INFECTION WITHOUT HEMATURIA, SITE UNSPECIFIED: ICD-10-CM

## 2021-05-07 DIAGNOSIS — R63.4 WEIGHT LOSS: ICD-10-CM

## 2021-05-07 PROCEDURE — 99215 OFFICE O/P EST HI 40 MIN: CPT | Performed by: INTERNAL MEDICINE

## 2021-05-07 RX ORDER — GABAPENTIN 300 MG/1
300 CAPSULE ORAL 2 TIMES DAILY
Qty: 120 CAPSULE | Refills: 2 | Status: SHIPPED | OUTPATIENT
Start: 2021-05-07 | End: 2021-07-22

## 2021-05-07 NOTE — PROGRESS NOTES
INTERNAL MEDICINE OFFICE VISIT  [facility]  [address]    NAME: Ciera Pham  AGE: 46 y o  SEX: female    DATE OF ENCOUNTER: 2021    Assessment and Plan     1  COVID-19 virus infection  Resolved, out of the isolation period, Positive 2021 ; ED Visit , no hospitalization,  No anti monoclonal  Therapy , did not require O2 supplement  2  Intractable chronic migraine without aura and without status migrainosus  Toradol PRN, Tylenol with Codeine   ED  Visit  L Posterior headache;  Resolved,  Usually gets migraine once or twice a year     3  Urinary tract infection without hematuria, site unspecified   was prescribed Bactrim x3 days last visit, resolved denies any residual urinary symptoms    4  Weight loss  Weight is stable this visit from March visit despite having Diego recently complicated with decreased oral intake /appetite   TSH normal,  CT chest unremarkable  Abnormal Rt Lower breast calcifications on Mammo 2021, will redo 2021   Will continue to  monitor    5  Depression screening   PHQ 2- patient is on Wellbutrin for smoking cessation    6  Cigarette nicotine dependence with nicotine-induced disorder  33 PY Hx, 1 P/d ; Wellbutrin and N lozenges ordered  visit   per patient Wellbutrin Plan was interrupted by the COVID infection patient confirms her interest in quitting smoking and restarting Wellbutrin   will follow-up on smoking cessation progress and plan next visit    7  Anterior cerebral artery aneurysm  Incidental findings ; ED  CTA H w Venogram  2mm outpouching LYN  Family Hx mother and maternal uncle and maternal grand mother had cerebral aneurysm,  of it all three   Neuro Surg f/u scheduled already next week     8  Hot flashes due to menopause  Night sweating Most likely Pre-menopausal symptoms along with irregular menstrual cycles   - gabapentin (NEURONTIN) 300 mg capsule;  Take 1 capsule (300 mg total) by mouth 2 (two) times a day  Dispense: 120 capsule; Refill: 2  oral contraceptives  Deferred due to current smoker on high risk of blood clots;  SSRI deferred due to currently on Wellbutrin a high risk of Serotonin syndrome    9  Nodule of skin of back   he has patient risk concern about a growing skin nodule in the right thoracic  Back,  Irritants and painful,  3 x 3 mm pedunculated pink nodule on exam right thoracic back T9-10 level   - Ambulatory referral to Dermatology; Future    No orders of the defined types were placed in this encounter       - Counseling Documentation: patient was counseled regarding: risks and benefits of treatment options    Chief Complaint    hot flashes, follow-up on chronic conditions    History of Present Illness     HPI  patient is a 46years old female with past medical history significant for migraine, smoking dependency, recent COVID infection who presents to follow-up on her conditions  Migraines, UTI are resolved, COVID resolved as well patient denies any residual symptoms of shortness of breath  Patient still complains of hot flashes at night and reports irregular menstrual periods the above plan explained to the patient with gabapentin and not using oral contraceptives or SSRI  Patient reports that the Wellbutrin and some of consistent cessation plan was interrupted by her COVID infection and she is willing to restart Wellbutrin and work on decreasing or quitting smoking as she still smoke 1 pack a day  Patient raise a concern about skin nodule in her back examined, and given the accelerating growing patient referred to Dermatology and to follow-up next visit in 15 weeks or sooner if needed     The following portions of the patient's history were reviewed and updated as appropriate: allergies, current medications, past family history, past medical history, past social history, past surgical history and problem list     Review of Systems     Review of Systems   Review of system times 12 reviewed with the patient pertinent positives and negatives noted above otherwise unremarkable  Active Problem List     Patient Active Problem List   Diagnosis    Annual physical exam    Smoking    Anxiety    Depression    Tubular adenoma of colon    COVID-19 virus infection    Migraine       Objective     There were no vitals taken for this visit  Physical Exam  - GEN: Appears well, alert and oriented x 3, pleasant and cooperative, in no acute distress  - HEENT: Anicteric, mucous membranes moist, PERRL and EOMI   - NECK: No lymphadenopathy, JVD or carotid bruits   - HEART: RRR, normal S1 and S2, no murmurs, clicks, gallops or rubs   - LUNGS: Clear to auscultation bilaterally; no wheezes, rales, or rhonchi  - ABDOMEN: Normal bowel sounds, soft, no tenderness, no distention, no organomegaly or masses felt on exam    - EXTREMITIES: Peripheral pulses normal; no clubbing, cyanosis, or edema  - NEURO: No focal findings, CN II-XII are grossly intact  - Musculoskeletal: 5/5 strength, normal ROM, no swollen or erythematous joints     - SKIN:  3 x 3 mm pedunculated pink nodule on exam right thoracic back T9-10 level   Pertinent Laboratory/Diagnostic Studies:    CBC:   Results from Last 12 Months   Lab Units 07/25/20  0909   WBC Thousand/uL 7 35   RBC Million/uL 4 14   HEMOGLOBIN g/dL 12 1   HEMATOCRIT % 38 2   MCV fL 92   MCH pg 29 2   MCHC g/dL 31 7   RDW % 14 6   MPV fL 9 6   PLATELETS Thousands/uL 296   NRBC AUTO /100 WBCs 0   NEUTROS PCT % 62   LYMPHS PCT % 31   MONOS PCT % 6   EOS PCT % 1   BASOS PCT % 0   NEUTROS ABS Thousands/µL 4 53   LYMPHS ABS Thousands/µL 2 26   MONOS ABS Thousand/µL 0 44   EOS ABS Thousand/µL 0 06     Chemistry Profile:   Results from Last 12 Months   Lab Units 04/18/21  1506 07/25/20  0909   POTASSIUM mmol/L 4 0 4 7   CHLORIDE mmol/L 109* 108   CO2 mmol/L 30 28   BUN mg/dL 9 11   CREATININE mg/dL 0 67 0 72   GLUCOSE FASTING mg/dL  --  83   GLUCOSE RANDOM mg/dL 91 --    CALCIUM mg/dL 9 2 9 0   AST U/L  --  11   ALT U/L  --  19   ALK PHOS U/L  --  61   EGFR ml/min/1 73sq m 102 98       Current Medications     Current Outpatient Medications:     cyclobenzaprine (FLEXERIL) 5 mg tablet, Take 1 tablet (5 mg total) by mouth 3 (three) times a day as needed for muscle spasms for up to 20 days, Disp: 30 tablet, Rfl: 0    ketorolac (TORADOL) 10 mg tablet, Take 1 tablet (10 mg total) by mouth every 6 (six) hours as needed for moderate pain for up to 20 days, Disp: 20 tablet, Rfl: 0    Health Maintenance     Health Maintenance   Topic Date Due    COVID-19 Vaccine (1) Never done    Annual Physical  07/24/2021    Influenza Vaccine (Season Ended) 09/01/2021    MAMMOGRAM  03/18/2022    BMI: Followup Plan  03/31/2022    Depression Remission PHQ  03/31/2022    BMI: Adult  04/16/2022    Cervical Cancer Screening  07/16/2025    DTaP,Tdap,and Td Vaccines (2 - Td) 02/01/2029    Colorectal Cancer Screening  08/04/2030    HIV Screening  Completed    Pneumococcal Vaccine: Pediatrics (0 to 5 Years) and At-Risk Patients (6 to 59 Years)  Completed    HIB Vaccine  Aged Out    Hepatitis B Vaccine  Aged Out    IPV Vaccine  Aged Out    Hepatitis A Vaccine  Aged Out    Meningococcal ACWY Vaccine  Aged Out    HPV Vaccine  Aged [employer] History   Administered Date(s) Administered    INFLUENZA 10/19/2015, 09/07/2018    Influenza, injectable, quadrivalent, preservative free 0 5 mL 09/07/2018    Pneumococcal Polysaccharide PPV23 09/07/2018    Tdap 02/01/2019       Peg Alcazar DO  Internal Medicine  PGY-1  5/6/2021 8:18 PM

## 2021-05-10 ENCOUNTER — OFFICE VISIT (OUTPATIENT)
Dept: NEUROSURGERY | Facility: CLINIC | Age: 51
End: 2021-05-10
Payer: COMMERCIAL

## 2021-05-10 VITALS
HEIGHT: 61 IN | DIASTOLIC BLOOD PRESSURE: 60 MMHG | BODY MASS INDEX: 17.56 KG/M2 | WEIGHT: 93 LBS | RESPIRATION RATE: 16 BRPM | TEMPERATURE: 98 F | SYSTOLIC BLOOD PRESSURE: 100 MMHG

## 2021-05-10 DIAGNOSIS — I67.1 ANTERIOR CEREBRAL ARTERY ANEURYSM: Primary | ICD-10-CM

## 2021-05-10 DIAGNOSIS — R93.89 ABNORMAL COMPUTED TOMOGRAPHY ANGIOGRAPHY (CTA): ICD-10-CM

## 2021-05-10 PROCEDURE — 3008F BODY MASS INDEX DOCD: CPT | Performed by: RADIOLOGY

## 2021-05-10 PROCEDURE — 3008F BODY MASS INDEX DOCD: CPT | Performed by: INTERNAL MEDICINE

## 2021-05-10 PROCEDURE — 99204 OFFICE O/P NEW MOD 45 MIN: CPT | Performed by: RADIOLOGY

## 2021-05-10 NOTE — PROGRESS NOTES
Assessment/Plan:     Diagnoses and all orders for this visit:    Anterior cerebral artery aneurysm  -     CTA head w wo contrast; Future    Abnormal computed tomography angiography (CTA)  -     Ambulatory referral to Neurosurgery        Discussion Summary:   Yasmeen Flores has a 2 mm ACOM aneurysm vs infundibulum  Since we cannot distinguish between the 2 on CTA and due to her significant family history, we will consider this to be an aneurysm  She will return in 6 months with a follow CTA  I have recommended that her family members be screened for cerebral aneurysms as well with an MRA  A letter was provided  Chief Complaint: Follow-up      Patient ID: Rin Benton is a 46 y o  female    HPI   Ms  Cuate Johansen presents for evaluation of an incidental aneurysm  Recent diagnosis of COVID-19 and presented to the ER with severe headaches  Due to concern for CVT, a CTV was performed which identified a possible 2 mm ACOM aneurysm  Patient is , 4 children ages 30,22,19, and 24  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[place] [employer]  Currently smokes about 1 pack a day is trying to cut down  Denies alcohol and drug usage  Mother passed from brain aneurysm hemorrhage at the age of 54  Maternal grandmother had a brain aneurysm however passed from a heart attack in her [de-identified]  Maternal uncle passed had spontaneously from a brain hemorrhage in his 52's        Review of Systems   Constitutional: Negative  HENT: Positive for tinnitus (sometimes, bilateral ears)  Eyes: Positive for visual disturbance (very blurry)  Respiratory: Negative  Cardiovascular: Negative  Gastrointestinal: Negative  Endocrine: Negative  Genitourinary: Negative  Musculoskeletal: Positive for back pain (sometimes lower right back pain), gait problem (from dizziness), myalgias (left side of abdomen) and neck pain (a little pain in the back of her neck)  Skin: Negative  Allergic/Immunologic: Negative  Neurological: Positive for dizziness (had a few dizzy spells within the past year), numbness (sometimes on fingers and legs will feel heavy) and headaches (always, suffers from migraines)  Negative for tremors, seizures, syncope, weakness and light-headedness  Hematological: Bruises/bleeds easily  Psychiatric/Behavioral: Negative  I have personally reviewed the MA's review of systems and made changes as necessary  The following portions of the patient's history were reviewed and updated as appropriate: allergies, current medications, past family history, past medical history, past social history, past surgical history and problem list       Active Ambulatory Problems     Diagnosis Date Noted    Annual physical exam 2020    Smoking 2020    Anxiety 2020    Depression 2020    Tubular adenoma of colon 2021    COVID-19 virus infection 2021    Migraine 2021    Weight loss 2021    Cigarette nicotine dependence with nicotine-induced disorder 2021    Anterior cerebral artery aneurysm 2021     Resolved Ambulatory Problems     Diagnosis Date Noted    Headache 2021     Past Medical History:   Diagnosis Date    Abnormal Pap smear of cervix     Anxiety and depression     Breast lump     Nicotine dependence        Past Surgical History:   Procedure Laterality Date    BREAST EXCISIONAL BIOPSY Right 2014    excisional- done in [address] [place] [address] LUMPECTOMY Right 2014    benign     SECTION      x's 4    DILATION AND CURETTAGE, DIAGNOSTIC / THERAPEUTIC      pre cancerous cells?  MAMMO (HISTORICAL)  2018    MULTIPLE TOOTH EXTRACTIONS      X2    TUBAL LIGATION               Vitals:    05/10/21 1403   BP: 100/60   Resp: 16   Temp: 98 °F (36 7 °C)         Objective:    Physical Exam  Neurologic Exam    Results/Data:  I have reviewed the results and images from the CTA in detail with the patient

## 2021-05-10 NOTE — LETTER
May 10, 2021     Patient: Rin Benton   YOB: 1970   Date of Visit: 5/10/2021       To Whom it May Concern:    Rin Benton is under my professional care  She was seen in my office on 5/10/2021  She has a significant family history for cerebral aneurysms and as such her family members should be screened  Specifically, all maternal aunts, uncles, cousins, and her own children should be screened with an MRA brain once every 5 years  If you have any questions or concerns, please don't hesitate to call           Sincerely,          Sameer Dave MD        CC: No Recipients

## 2021-05-13 ENCOUNTER — IMMUNIZATIONS (OUTPATIENT)
Dept: FAMILY MEDICINE CLINIC | Facility: HOSPITAL | Age: 51
End: 2021-05-13

## 2021-05-13 DIAGNOSIS — Z23 ENCOUNTER FOR IMMUNIZATION: Primary | ICD-10-CM

## 2021-05-13 PROBLEM — Z01.419 ENCOUNTER FOR GYNECOLOGICAL EXAMINATION (GENERAL) (ROUTINE) WITHOUT ABNORMAL FINDINGS: Status: ACTIVE | Noted: 2021-05-13

## 2021-05-13 PROCEDURE — 0001A SARS-COV-2 / COVID-19 MRNA VACCINE (PFIZER-BIONTECH) 30 MCG: CPT

## 2021-05-13 PROCEDURE — 91300 SARS-COV-2 / COVID-19 MRNA VACCINE (PFIZER-BIONTECH) 30 MCG: CPT

## 2021-06-12 ENCOUNTER — APPOINTMENT (OUTPATIENT)
Dept: FAMILY MEDICINE CLINIC | Facility: HOSPITAL | Age: 51
End: 2021-06-12

## 2021-07-22 ENCOUNTER — OFFICE VISIT (OUTPATIENT)
Dept: OBGYN CLINIC | Facility: CLINIC | Age: 51
End: 2021-07-22
Payer: COMMERCIAL

## 2021-07-22 VITALS
DIASTOLIC BLOOD PRESSURE: 64 MMHG | BODY MASS INDEX: 17.22 KG/M2 | HEIGHT: 61 IN | SYSTOLIC BLOOD PRESSURE: 110 MMHG | WEIGHT: 91.2 LBS

## 2021-07-22 DIAGNOSIS — Z01.419 ENCOUNTER FOR GYNECOLOGICAL EXAMINATION (GENERAL) (ROUTINE) WITHOUT ABNORMAL FINDINGS: ICD-10-CM

## 2021-07-22 DIAGNOSIS — R92.8 ABNORMAL MAMMOGRAM: Primary | ICD-10-CM

## 2021-07-22 DIAGNOSIS — N95.1 HOT FLASHES DUE TO MENOPAUSE: ICD-10-CM

## 2021-07-22 PROCEDURE — S0610 ANNUAL GYNECOLOGICAL EXAMINA: HCPCS | Performed by: NURSE PRACTITIONER

## 2021-07-22 NOTE — PROGRESS NOTES
Encounter for gynecological examination (general) (routine) without abnormal findings  Normal findings on routine gyn exam  Advised monthly SBE, annual CBE and annual screening mammo  Reviewed ASCCP guidelines and recommended pap with cotesting q3 yrs for this low risk patient-up to date  Colonoscopy up to date  STI testing was offered and the patient declines; she reports low risk    Diet/activity recommendations:  Encouraged daily Ca++ and vitamin D intake as well as daily weight bearing exercise for promotion of bone health  RTO in one year or sooner PRN  Abnormal mammogram  3/21 FADI 3 with right calcifications  Recommend dx mammo bilateral in 6 months  Orders placed  Hot flashes due to menopause  Not affecting QOL  Advised OTC supplements and call if getting worse  Diagnoses and all orders for this visit:    Abnormal mammogram  -     Mammo diagnostic bilateral w 3d & cad; Future    Encounter for gynecological examination (general) (routine) without abnormal findings    Hot flashes due to menopause         Shaun Doss  1970      CC:  Yearly exam     S:  Shaun Doss is a 46 y o  female here for NP yearly exam  She denies breast concerns, abdominal/pelvic pain, abnormal vaginal discharge or bladder/bowel dysfunction  Her cycles are irregular-q 3-6 weeks  Light bleeding without clots or pain  Hx of right breast bx/lumpectomy in 2014  Gets mammo q 6 months due to calcifications  Due in September  Having hot flashes that are not affecting QOL  She is a smoker  She had 4 C/S    Sexual activity: She is not currently sexually active     STD testing:  She does not want STD testing today  Last Pap 7/20 neg/neg   Last Mammo 3/21 right calcifications  Bilateral dx recommended in Sept   Last Colonoscopy 7/20 normal   SBE no       Family hx of breast cancer: deneis  Family hx of ovarian cancer:denies  Family hx of colon cancer: denies    No current outpatient medications on file    Social History     Socioeconomic History    Marital status: /Civil Union     Spouse name: Not on file    Number of children: Not on file    Years of education: 2 yr college    Highest education level: Not on file   Occupational History    Occupation: supervisor    Tobacco Use    Smoking status: Current Every Day Smoker     Packs/day: 1 00     Years: 33 00     Pack years: 33 00     Types: Cigarettes    Smokeless tobacco: Never Used   Vaping Use    Vaping Use: Some days    Substances: Nicotine   Substance and Sexual Activity    Alcohol use: Not Currently    Drug use: Never    Sexual activity: Not Currently     Partners: Male   Other Topics Concern    Not on file   Social History Narrative    Most recent tobacco use screenin2019    Do you currently or have you served in the [employer]: No    Were you activated, into active duty, as a member of the [employer] or as a Reservist: No    Occupation: supervisor    Education: [address] stress level: Medium    Exercise level: None    Diet: Regular    Marital status:     Sexual orientation: Heterosexual    Alcohol intake: None    Live alone or with others: with others    Caffeine intake: Moderate    coffee daily    Illicit drugs: Denies    Sexually active: Yes    Seat belts used routinely: Yes    Sunscreen used routinely: No     Social Determinants of Health     Financial Resource Strain: Low Risk     Difficulty of Paying Living Expenses: Not hard at all   Food Insecurity: No Food Insecurity    Worried About Running Out of Food in the Last Year: Never true    Xiomy of Food in the Last Year: Never true   Transportation Needs: No Transportation Needs    Lack of Transportation (Medical): No    Lack of Transportation (Non-Medical):  No   Physical Activity: Sufficiently Active    Days of Exercise per Week: 4 days    Minutes of Exercise per Session: 150+ min   Stress:     Feeling of Stress :    Social Connections:     Frequency of Communication with Friends and Family:     Frequency of Social Gatherings with Friends and Family:     Attends Holiness Services:     Active Member of Clubs or Organizations:     Attends Club or Organization Meetings:     Marital Status:    Intimate Partner Violence:     Fear of Current or Ex-Partner:     Emotionally Abused:     Physically Abused:     Sexually Abused:      Family History   Problem Relation Age of Onset    No Known Problems Daughter     No Known Problems Son     No Known Problems Son     No Known Problems Son     Hypertension Mother     Aneurysm Mother     No Known Problems Father     No Known Problems Sister     No Known Problems Maternal Grandmother     No Known Problems Maternal Grandfather     No Known Problems Paternal Grandmother     No Known Problems Paternal Grandfather     Stroke Brother     No Known Problems Brother     No Known Problems Maternal Aunt       Past Medical History:   Diagnosis Date    Abnormal Pap smear of cervix     HPV and PAP smear 07/16/2020 normal     Anxiety     Anxiety and depression     no longer on meds    Breast lump     hx of right breast lump in lumpectomy in 2014    Depression     Migraine     Nicotine dependence     currently smoker, 33 pack year hx        Review of Systems   Constitutional: Negative for appetite change, fatigue and unexpected weight change  Respiratory: Negative for shortness of breath  Cardiovascular: Negative for chest pain and leg swelling  Gastrointestinal: Negative for abdominal pain  Endocrine: Negative for cold intolerance  + for hot flashes    Breasts:  Negative for tenderness, pain or masses  Genitourinary: Negative for dysuria, flank pain, frequency, genital sores, hematuria,and pelvic pain  + for irregular menses   Musculoskeletal: Negative for back pain  Neurological: Negative for headaches       O:  Blood pressure 110/64, height 5' 0 75" (1 543 m), weight 41 4 kg (91 lb 3 2 oz), last menstrual period 07/11/2021  Patient appears well and is not in distress  ROM normal   Neck is supple without masses  Normal thyroid  Heart regular rate and rhythm  Lungs CTA bilaterally   Breasts are symmetrical without mass, tenderness, nipple discharge, skin changes or adenopathy  Abdomen is soft and nontender without masses  External genitals are normal without lesions or rashes  Urethral meatus and urethra are normal  Bladder is normal to palpation  Vagina is normal without discharge-small amount of menstrual blood present   Cervix is normal without discharge or lesion  Uterus is normal, mobile, nontender without palpable mass  Adnexa are normal, nontender, without palpable mass     Skin warm and dry   Capillary refill < 2 seconds  Alert and oriented x 3 with normal affect

## 2021-07-22 NOTE — ASSESSMENT & PLAN NOTE
Normal findings on routine gyn exam  Advised monthly SBE, annual CBE and annual screening mammo  Reviewed ASCCP guidelines and recommended pap with cotesting q3 yrs for this low risk patient-up to date  Colonoscopy up to date  STI testing was offered and the patient declines; she reports low risk    Diet/activity recommendations:  Encouraged daily Ca++ and vitamin D intake as well as daily weight bearing exercise for promotion of bone health  RTO in one year or sooner PRN

## 2021-08-17 ENCOUNTER — TELEPHONE (OUTPATIENT)
Dept: INTERNAL MEDICINE CLINIC | Facility: CLINIC | Age: 51
End: 2021-08-17

## 2021-08-17 NOTE — TELEPHONE ENCOUNTER
CONFIDENTIAL             Dear Annabel Ceja have had 1 no-show appointments at [facility]  An appointment is considered a no-show when a patient has not called the practice at least two (2) hours before their scheduled appointment to either reschedule or cancel the appointment  In the future, please call in advance to cancel or reschedule appointments  If you continue to not show for scheduled appointments, you may be discharged from [address] [place] [address]  after three, consecutive no-shows  Thank you         Respectfully,   [facility] Staff        CONFIDENCIAL

## 2021-11-10 ENCOUNTER — CONSULT (OUTPATIENT)
Dept: MULTI SPECIALTY CLINIC | Facility: CLINIC | Age: 51
End: 2021-11-10

## 2021-11-10 ENCOUNTER — HOSPITAL ENCOUNTER (OUTPATIENT)
Dept: RADIOLOGY | Facility: HOSPITAL | Age: 51
Discharge: HOME | End: 2021-11-10
Attending: RADIOLOGY
Payer: COMMERCIAL

## 2021-11-10 VITALS — HEIGHT: 61 IN | BODY MASS INDEX: 18.88 KG/M2 | WEIGHT: 100 LBS

## 2021-11-10 DIAGNOSIS — I67.1 ANTERIOR CEREBRAL ARTERY ANEURYSM: ICD-10-CM

## 2021-11-10 DIAGNOSIS — L81.8 IDIOPATHIC GUTTATE HYPOMELANOSIS: ICD-10-CM

## 2021-11-10 DIAGNOSIS — R20.2 NOTALGIA PARESTHETICA: Primary | ICD-10-CM

## 2021-11-10 PROCEDURE — 99243 OFF/OP CNSLTJ NEW/EST LOW 30: CPT | Performed by: DERMATOLOGY

## 2021-11-10 PROCEDURE — 70496 CT ANGIOGRAPHY HEAD: CPT

## 2021-11-10 RX ADMIN — IOHEXOL 85 ML: 350 INJECTION, SOLUTION INTRAVENOUS at 08:48

## 2021-11-15 ENCOUNTER — OFFICE VISIT (OUTPATIENT)
Dept: NEUROSURGERY | Facility: CLINIC | Age: 51
End: 2021-11-15
Payer: COMMERCIAL

## 2021-11-15 VITALS
HEART RATE: 78 BPM | SYSTOLIC BLOOD PRESSURE: 100 MMHG | HEIGHT: 61 IN | WEIGHT: 98.6 LBS | BODY MASS INDEX: 18.61 KG/M2 | TEMPERATURE: 97.5 F | DIASTOLIC BLOOD PRESSURE: 62 MMHG | RESPIRATION RATE: 16 BRPM

## 2021-11-15 DIAGNOSIS — Z82.49 FAMILY HISTORY OF CEREBRAL ANEURYSM: Primary | ICD-10-CM

## 2021-11-15 DIAGNOSIS — I67.1 ANTERIOR CEREBRAL ARTERY ANEURYSM: ICD-10-CM

## 2021-11-15 PROCEDURE — 3008F BODY MASS INDEX DOCD: CPT | Performed by: RADIOLOGY

## 2021-11-15 PROCEDURE — 99214 OFFICE O/P EST MOD 30 MIN: CPT | Performed by: RADIOLOGY

## 2021-11-15 PROCEDURE — 3008F BODY MASS INDEX DOCD: CPT | Performed by: DERMATOLOGY

## 2021-11-17 ENCOUNTER — HOSPITAL ENCOUNTER (OUTPATIENT)
Dept: MAMMOGRAPHY | Facility: CLINIC | Age: 51
Discharge: HOME | End: 2021-11-17
Payer: COMMERCIAL

## 2021-11-17 DIAGNOSIS — R92.8 ABNORMAL MAMMOGRAM: ICD-10-CM

## 2021-11-17 PROCEDURE — 77066 DX MAMMO INCL CAD BI: CPT

## 2021-11-17 PROCEDURE — G0279 TOMOSYNTHESIS, MAMMO: HCPCS

## 2021-12-01 ENCOUNTER — TELEPHONE (OUTPATIENT)
Dept: OBGYN CLINIC | Facility: CLINIC | Age: 51
End: 2021-12-01

## 2021-12-01 DIAGNOSIS — R92.8 ABNORMAL MAMMOGRAM: Primary | ICD-10-CM

## 2022-01-18 ENCOUNTER — OFFICE VISIT (OUTPATIENT)
Dept: PODIATRY | Facility: CLINIC | Age: 52
End: 2022-01-18
Payer: COMMERCIAL

## 2022-01-18 VITALS
DIASTOLIC BLOOD PRESSURE: 69 MMHG | BODY MASS INDEX: 18.12 KG/M2 | SYSTOLIC BLOOD PRESSURE: 101 MMHG | HEART RATE: 83 BPM | WEIGHT: 96 LBS | HEIGHT: 61 IN

## 2022-01-18 DIAGNOSIS — R22.41 LOCALIZED SWELLING, MASS AND LUMP, LOWER LIMB, RIGHT: ICD-10-CM

## 2022-01-18 DIAGNOSIS — M72.2 PLANTAR FASCIAL FIBROMATOSIS OF BOTH FEET: Primary | ICD-10-CM

## 2022-01-18 PROCEDURE — 99243 OFF/OP CNSLTJ NEW/EST LOW 30: CPT | Performed by: PODIATRIST

## 2022-01-18 PROCEDURE — 3008F BODY MASS INDEX DOCD: CPT | Performed by: PODIATRIST

## 2022-01-18 NOTE — PROGRESS NOTES
PATIENT:  Shante Ruiz  1970       ASSESSMENT:     1  Plantar fascial fibromatosis of both feet     2  Localized swelling, mass and lump, lower limb, right  XR foot 3+ vw right           PLAN:  1  Patient was counseled and educated on the condition and the diagnosis  2  X-ray was obtained and personally reviewed  The radiological findings were discussed with the patient  3  The exam and symptoms are consistent with plantar fibroma  The diagnosis, treatment options and prognosis were discussed with the patient  4  Instructed supportive care, home exercise/ stretching, icing, and proper footwear/ arch support  Discussed possible injection depending on the progress  Discussed possible surgical treatment if conservative care fails  Subjective:       HPI  The patient presents with chief complaint of lump on right foot  She noticed it last week  Mild tenderness / dull aches at the worse  She denied any injury to her right foot  No dysfunction or numbness in her feet  Denied any swelling  No redness  The following portions of the patient's history were reviewed and updated as appropriate: allergies, current medications, past family history, past medical history, past social history, past surgical history and problem list   All pertinent labs and images were reviewed        Past Medical History  Past Medical History:   Diagnosis Date    Abnormal Pap smear of cervix     HPV and PAP smear 2020 normal     Anxiety     Anxiety and depression     no longer on meds    Breast lump     hx of right breast lump in lumpectomy in     Depression     Migraine     Nicotine dependence     currently smoker, 33 pack year hx       Past Surgical History  Past Surgical History:   Procedure Laterality Date    BREAST EXCISIONAL BIOPSY Right     excisional- done in [address] [place] [address] LUMPECTOMY Right     benign     SECTION      x's 4    COLONOSCOPY  2020    DILATION AND CURETTAGE, DIAGNOSTIC / THERAPEUTIC      pre cancerous cells?  MAMMO (HISTORICAL)  2018    MULTIPLE TOOTH EXTRACTIONS      X2    TUBAL LIGATION          Allergies:  Patient has no known allergies  Medications:  No current outpatient medications on file  No current facility-administered medications for this visit  Social History:  Social History     Socioeconomic History    Marital status: /Civil Union     Spouse name: None    Number of children: None    Years of education: 2 yr college    Highest education level: None   Occupational History    Occupation: supervisor    Tobacco Use    Smoking status: Current Every Day Smoker     Packs/day: 1 00     Years: 33 00     Pack years: 33 00     Types: Cigarettes    Smokeless tobacco: Never Used   Vaping Use    Vaping Use: Some days    Substances: Nicotine   Substance and Sexual Activity    Alcohol use: Not Currently    Drug use: Never    Sexual activity: Not Currently     Partners: Male   Other Topics Concern    None   Social History Narrative    Most recent tobacco use screenin2019    Do you currently or have you served in the [employer]: No    Were you activated, into active duty, as a member of the [employer] or as a Reservist: No    Occupation: supervisor    Education: [address] stress level: Medium    Exercise level: None    Diet: Regular    Marital status:     Sexual orientation: Heterosexual    Alcohol intake: None    Live alone or with others: with others    Caffeine intake:  Moderate    coffee daily    Illicit drugs: Denies    Sexually active: Yes    Seat belts used routinely: Yes    Sunscreen used routinely: No     Social Determinants of Health     Financial Resource Strain: Low Risk     Difficulty of Paying Living Expenses: Not hard at all   Food Insecurity: No Food Insecurity    Worried About Running Out of Food in the Last Year: Never true   [employer] in the Last Year: Never true   Transportation Needs: No Transportation Needs    Lack of Transportation (Medical): No    Lack of Transportation (Non-Medical): No   Physical Activity: Sufficiently Active    Days of Exercise per Week: 4 days    Minutes of Exercise per Session: 150+ min   Stress: Not on file   Social Connections: Not on file   Intimate Partner Violence: Not on file   Housing Stability: Not on file          Review of Systems   Constitutional: Negative for appetite change, chills and fever  HENT: Negative for sore throat  Respiratory: Negative for cough and shortness of breath  Cardiovascular: Negative for chest pain and leg swelling  Gastrointestinal: Negative for diarrhea, nausea and vomiting  Musculoskeletal: Negative for gait problem  Skin: Negative for rash and wound  Allergic/Immunologic: Negative for immunocompromised state  Neurological: Negative for weakness and numbness  Hematological: Negative  Psychiatric/Behavioral: Negative for behavioral problems and confusion  Objective:      /69   Pulse 83   Ht 5' 1" (1 549 m) Comment: verbal  Wt 43 5 kg (96 lb)   BMI 18 14 kg/m²          Physical Exam  Vitals reviewed  Constitutional:       General: She is not in acute distress  Appearance: She is not toxic-appearing or diaphoretic  HENT:      Head: Normocephalic and atraumatic  Cardiovascular:      Rate and Rhythm: Normal rate and regular rhythm  Pulses: Normal pulses  Dorsalis pedis pulses are 2+ on the right side and 2+ on the left side  Posterior tibial pulses are 2+ on the right side and 2+ on the left side  Pulmonary:      Effort: Pulmonary effort is normal  No respiratory distress  Musculoskeletal:         General: No swelling or signs of injury  Cervical back: Normal range of motion and neck supple  Right lower leg: No edema  Left lower leg: No edema  Right foot: No foot drop        Left foot: No foot drop  Comments: Firm mass right arch  It is about 1 7 X 0 8 cm  Slight firm mass on left arch  Pain is minimal on palpation bilaterally  Feet:      Right foot:      Protective Sensation: 10 sites tested  10 sites sensed  Left foot:      Protective Sensation: 10 sites tested  10 sites sensed  Skin:     General: Skin is warm and dry  Capillary Refill: Capillary refill takes less than 2 seconds  Coloration: Skin is not cyanotic or mottled  Findings: No abscess, ecchymosis, erythema, rash or wound  Nails: There is no clubbing  Neurological:      General: No focal deficit present  Mental Status: She is alert and oriented to person, place, and time  Cranial Nerves: No cranial nerve deficit  Sensory: No sensory deficit  Motor: No weakness  Coordination: Coordination normal    Psychiatric:         Mood and Affect: Mood normal          Behavior: Behavior normal          Thought Content:  Thought content normal          Judgment: Judgment normal

## 2022-01-18 NOTE — LETTER
January 19, 2022     Peg Alcazar, [address] [place] [id number]    Patient: Beni Goldberg   YOB: 1970   Date of Visit: 1/18/2022       Dear Dr Erin Rosas: Thank you for referring Beni Goldberg to me for evaluation  Below are my notes for this consultation  If you have questions, please do not hesitate to call me  I look forward to following your patient along with you  Sincerely,        Concetta Masters DPM        CC: No Recipients  Didi Rodriguez  1/18/2022  5:27 PM  Signed                 PATIENT:  Beni Goldberg  1970       ASSESSMENT:     1  Plantar fascial fibromatosis of both feet     2  Localized swelling, mass and lump, lower limb, right  XR foot 3+ vw right           PLAN:  1  Patient was counseled and educated on the condition and the diagnosis  2  X-ray was obtained and personally reviewed  The radiological findings were discussed with the patient  3  The exam and symptoms are consistent with plantar fibroma  The diagnosis, treatment options and prognosis were discussed with the patient  4  Instructed supportive care, home exercise/ stretching, icing, and proper footwear/ arch support  Discussed possible injection depending on the progress  Discussed possible surgical treatment if conservative care fails  Subjective:       HPI  The patient presents with chief complaint of lump on right foot  She noticed it last week  Mild tenderness / dull aches at the worse  She denied any injury to her right foot  No dysfunction or numbness in her feet  Denied any swelling  No redness  The following portions of the patient's history were reviewed and updated as appropriate: allergies, current medications, past family history, past medical history, past social history, past surgical history and problem list   All pertinent labs and images were reviewed        Past Medical History  Past Medical History:   Diagnosis Date    Abnormal Pap smear of cervix     HPV and PAP smear 2020 normal     Anxiety     Anxiety and depression     no longer on meds    Breast lump     hx of right breast lump in lumpectomy in     Depression     Migraine     Nicotine dependence     currently smoker, 33 pack year hx       Past Surgical History  Past Surgical History:   Procedure Laterality Date    BREAST EXCISIONAL BIOPSY Right     excisional- done in [address] [place] [address] LUMPECTOMY Right     benign     SECTION      x's 4    COLONOSCOPY  2020    DILATION AND CURETTAGE, DIAGNOSTIC / THERAPEUTIC      pre cancerous cells?  MAMMO (HISTORICAL)  2018    MULTIPLE TOOTH EXTRACTIONS      X2    TUBAL LIGATION          Allergies:  Patient has no known allergies  Medications:  No current outpatient medications on file  No current facility-administered medications for this visit         Social History:  Social History     Socioeconomic History    Marital status: /Civil Union     Spouse name: None    Number of children: None    Years of education: 2 yr college    Highest education level: None   Occupational History    Occupation: supervisor    Tobacco Use    Smoking status: Current Every Day Smoker     Packs/day: 1 00     Years: 33 00     Pack years: 33 00     Types: Cigarettes    Smokeless tobacco: Never Used   Vaping Use    Vaping Use: Some days    Substances: Nicotine   Substance and Sexual Activity    Alcohol use: Not Currently    Drug use: Never    Sexual activity: Not Currently     Partners: Male   Other Topics Concern    None   Social History Narrative    Most recent tobacco use screenin2019    Do you currently or have you served in the [employer]: No    Were you activated, into active duty, as a member of the [employer] or as a Reservist: No    Occupation: supervisor    Education: [address] stress level: Medium    Exercise level: None    Diet: Regular    Marital status:     Sexual orientation: Heterosexual    Alcohol intake: None    Live alone or with others: with others    Caffeine intake: Moderate    coffee daily    Illicit drugs: Denies    Sexually active: Yes    Seat belts used routinely: Yes    Sunscreen used routinely: No     Social Determinants of Health     Financial Resource Strain: Low Risk     Difficulty of Paying Living Expenses: Not hard at all   Food Insecurity: No Food Insecurity    Worried About Running Out of Food in the Last Year: Never true    Xi[employer] of Food in the Last Year: Never true   Transportation Needs: No Transportation Needs    Lack of Transportation (Medical): No    Lack of Transportation (Non-Medical): No   Physical Activity: Sufficiently Active    Days of Exercise per Week: 4 days    Minutes of Exercise per Session: 150+ min   Stress: Not on file   Social Connections: Not on file   Intimate Partner Violence: Not on file   Housing Stability: Not on file          Review of Systems   Constitutional: Negative for appetite change, chills and fever  HENT: Negative for sore throat  Respiratory: Negative for cough and shortness of breath  Cardiovascular: Negative for chest pain and leg swelling  Gastrointestinal: Negative for diarrhea, nausea and vomiting  Musculoskeletal: Negative for gait problem  Skin: Negative for rash and wound  Allergic/Immunologic: Negative for immunocompromised state  Neurological: Negative for weakness and numbness  Hematological: Negative  Psychiatric/Behavioral: Negative for behavioral problems and confusion  Objective:      /69   Pulse 83   Ht 5' 1" (1 549 m) Comment: verbal  Wt 43 5 kg (96 lb)   BMI 18 14 kg/m²          Physical Exam  Vitals reviewed  Constitutional:       General: She is not in acute distress  Appearance: She is not toxic-appearing or diaphoretic  HENT:      Head: Normocephalic and atraumatic  Cardiovascular:      Rate and Rhythm: Normal rate and regular rhythm        Pulses: Normal pulses  Dorsalis pedis pulses are 2+ on the right side and 2+ on the left side  Posterior tibial pulses are 2+ on the right side and 2+ on the left side  Pulmonary:      Effort: Pulmonary effort is normal  No respiratory distress  Musculoskeletal:         General: No swelling or signs of injury  Cervical back: Normal range of motion and neck supple  Right lower leg: No edema  Left lower leg: No edema  Right foot: No foot drop  Left foot: No foot drop  Comments: Firm mass right arch  It is about 1 7 X 0 8 cm  Slight firm mass on left arch  Pain is minimal on palpation bilaterally  Feet:      Right foot:      Protective Sensation: 10 sites tested  10 sites sensed  Left foot:      Protective Sensation: 10 sites tested  10 sites sensed  Skin:     General: Skin is warm and dry  Capillary Refill: Capillary refill takes less than 2 seconds  Coloration: Skin is not cyanotic or mottled  Findings: No abscess, ecchymosis, erythema, rash or wound  Nails: There is no clubbing  Neurological:      General: No focal deficit present  Mental Status: She is alert and oriented to person, place, and time  Cranial Nerves: No cranial nerve deficit  Sensory: No sensory deficit  Motor: No weakness  Coordination: Coordination normal    Psychiatric:         Mood and Affect: Mood normal          Behavior: Behavior normal          Thought Content:  Thought content normal          Judgment: Judgment normal

## 2022-01-31 ENCOUNTER — TELEPHONE (OUTPATIENT)
Dept: INTERNAL MEDICINE CLINIC | Facility: CLINIC | Age: 52
End: 2022-01-31

## 2022-02-08 ENCOUNTER — APPOINTMENT (OUTPATIENT)
Dept: URGENT CARE | Facility: CLINIC | Age: 52
End: 2022-02-08
Payer: OTHER MISCELLANEOUS

## 2022-02-08 PROCEDURE — G0383 LEV 4 HOSP TYPE B ED VISIT: HCPCS | Performed by: FAMILY MEDICINE

## 2022-02-08 PROCEDURE — 99284 EMERGENCY DEPT VISIT MOD MDM: CPT | Performed by: FAMILY MEDICINE

## 2022-03-15 ENCOUNTER — APPOINTMENT (OUTPATIENT)
Dept: URGENT CARE | Facility: CLINIC | Age: 52
End: 2022-03-15
Payer: OTHER MISCELLANEOUS

## 2022-03-15 PROCEDURE — 99213 OFFICE O/P EST LOW 20 MIN: CPT | Performed by: FAMILY MEDICINE

## 2022-03-25 ENCOUNTER — OFFICE VISIT (OUTPATIENT)
Dept: INTERNAL MEDICINE CLINIC | Facility: CLINIC | Age: 52
End: 2022-03-25

## 2022-03-25 VITALS
DIASTOLIC BLOOD PRESSURE: 74 MMHG | SYSTOLIC BLOOD PRESSURE: 111 MMHG | TEMPERATURE: 97.5 F | HEART RATE: 81 BPM | BODY MASS INDEX: 19.63 KG/M2 | HEIGHT: 61 IN | WEIGHT: 104 LBS

## 2022-03-25 DIAGNOSIS — Z82.49 FAMILY HISTORY OF CEREBRAL ANEURYSM: ICD-10-CM

## 2022-03-25 DIAGNOSIS — F17.219 CIGARETTE NICOTINE DEPENDENCE WITH NICOTINE-INDUCED DISORDER: ICD-10-CM

## 2022-03-25 DIAGNOSIS — J02.9 ACUTE PHARYNGITIS, UNSPECIFIED ETIOLOGY: Primary | ICD-10-CM

## 2022-03-25 PROCEDURE — 99213 OFFICE O/P EST LOW 20 MIN: CPT | Performed by: INTERNAL MEDICINE

## 2022-03-25 RX ORDER — CETIRIZINE HYDROCHLORIDE 10 MG/1
10 TABLET ORAL DAILY
Qty: 30 TABLET | Refills: 1 | Status: SHIPPED | OUTPATIENT
Start: 2022-03-25

## 2022-03-25 RX ORDER — FLUTICASONE PROPIONATE 50 MCG
1 SPRAY, SUSPENSION (ML) NASAL DAILY
Qty: 9.9 ML | Refills: 1 | Status: SHIPPED | OUTPATIENT
Start: 2022-03-25

## 2022-03-25 RX ORDER — AZITHROMYCIN 250 MG/1
TABLET, FILM COATED ORAL
Qty: 6 TABLET | Refills: 0 | Status: SHIPPED | OUTPATIENT
Start: 2022-03-25 | End: 2022-03-29

## 2022-03-25 NOTE — PROGRESS NOTES
Clinic Visit Note  Eloina Skelton 46 y o  female   MRN: [id number]    Assessment and Plan      Diagnoses and all orders for this visit:    Acute pharyngitis, unspecified etiology  Given acute illness duration concern for viral with superimposed bacterial infection  History of smoking, treat with atypical antibiotic, Flonase and Zyrtec for conservative measures  Blood pressure stable okay to use a decongestant for max 3 days  -     azithromycin (ZITHROMAX) 250 mg tablet; Take 2 tablets today then 1 tablet daily x 4 days  -     fluticasone (FLONASE) 50 mcg/act nasal spray; 1 spray into each nostril daily  -     cetirizine (ZyrTEC) 10 mg tablet; Take 1 tablet (10 mg total) by mouth daily    Cigarette nicotine dependence with nicotine-induced disorder  Pre contemplation stage, we talked about trying to cut back for next visit    Family history of cerebral aneurysm  Denies headache, red flag symptoms, follows up with Neurosurgery, next CT imaging 5 years    My impressions and treatment recommendations were discussed in detail with the patient who verbalized understanding and had no further questions  Discharge instructions were provided  Subjective     Chief Complaint:  Same-day visit    History of Present Illness:    Patient is a very pleasant 51-year-old female coming in as a same-day visit secondary to acute illness  Patient has been struggling with an upper respiratory tract infection for 6-7 days now, has tried some over-the-counter remedies like ibuprofen and staying well hydrated but has not completely worked  Patient notes subjective fever a few days ago  History of tobacco abuse, still smoking      The following portions of the patient's history were reviewed and updated as appropriate: allergies, current medications, past family history, past medical history, past social history, past surgical history and problem list     REVIEW OF SYSTEMS:  A complete 12-point review of systems is negative other than that noted in the HPI  Review of Systems   Constitutional: Positive for fatigue  Negative for chills, diaphoresis and fever  HENT: Positive for congestion and sore throat  Negative for ear pain, hearing loss, sinus pressure and trouble swallowing  Respiratory: Positive for cough  Negative for shortness of breath and wheezing  Cardiovascular: Negative for chest pain, palpitations and leg swelling  Current Outpatient Medications:     azithromycin (ZITHROMAX) 250 mg tablet, Take 2 tablets today then 1 tablet daily x 4 days, Disp: 6 tablet, Rfl: 0    cetirizine (ZyrTEC) 10 mg tablet, Take 1 tablet (10 mg total) by mouth daily, Disp: 30 tablet, Rfl: 1    fluticasone (FLONASE) 50 mcg/act nasal spray, 1 spray into each nostril daily, Disp: 9 9 mL, Rfl: 1  Past Medical History:   Diagnosis Date    Abnormal Pap smear of cervix     HPV and PAP smear 2020 normal     Anxiety     Anxiety and depression     no longer on meds    Breast lump     hx of right breast lump in lumpectomy in     Depression     Migraine     Nicotine dependence     currently smoker, 33 pack year hx     Past Surgical History:   Procedure Laterality Date    BREAST EXCISIONAL BIOPSY Right     excisional- done in [address] [place] [address] LUMPECTOMY Right 2014    benign     SECTION      x's 4    COLONOSCOPY  2020    DILATION AND CURETTAGE, DIAGNOSTIC / THERAPEUTIC      pre cancerous cells?     MAMMO (HISTORICAL)  2018    MULTIPLE TOOTH EXTRACTIONS      X2    TUBAL LIGATION       Social History     Socioeconomic History    Marital status: /Civil Union     Spouse name: Not on file    Number of children: Not on file    Years of education: 2 yr college    Highest education level: Not on file   Occupational History    Occupation: supervisor    Tobacco Use    Smoking status: Current Every Day Smoker     Packs/day: 1 00     Years: 33 00     Pack years: 33 00     Types: Cigarettes    Smokeless tobacco: Never Used   Vaping Use    Vaping Use: Some days    Substances: Nicotine   Substance and Sexual Activity    Alcohol use: Not Currently    Drug use: Never    Sexual activity: Not Currently     Partners: Male   Other Topics Concern    Not on file   Social History Narrative    Most recent tobacco use screenin2019    Do you currently or have you served in the [employer]: No    Were you activated, into active duty, as a member of the [employer] or as a Reservist: No    Occupation: supervisor    Education: [address] stress level: Medium    Exercise level: None    Diet: Regular    Marital status:     Sexual orientation: Heterosexual    Alcohol intake: None    Live alone or with others: with others    Caffeine intake: Moderate    coffee daily    Illicit drugs: Denies    Sexually active: Yes    Seat belts used routinely: Yes    Sunscreen used routinely: No     Social Determinants of Health     Financial Resource Strain: Low Risk     Difficulty of Paying Living Expenses: Not hard at all   Food Insecurity: No Food Insecurity    Worried About Running Out of Food in the Last Year: Never true    Xiomy of Food in the Last Year: Never true   Transportation Needs: No Transportation Needs    Lack of Transportation (Medical): No    Lack of Transportation (Non-Medical):  No   Physical Activity: Sufficiently Active    Days of Exercise per Week: 4 days    Minutes of Exercise per Session: 150+ min   Stress: Not on file   Social Connections: Not on file   Intimate Partner Violence: Not on file   Housing Stability: Not on file     Family History   Problem Relation Age of Onset    No Known Problems Daughter     No Known Problems Son     No Known Problems Son     No Known Problems Son     Hypertension Mother     Aneurysm Mother     No Known Problems Father     No Known Problems Sister     No Known Problems Maternal Grandmother     No Known Problems Maternal Grandfather  No Known Problems Paternal Grandmother     No Known Problems Paternal Grandfather     Stroke Brother     No Known Problems Brother     No Known Problems Maternal Aunt      No Known Allergies    Objective     Vitals:    03/25/22 0757   BP: 111/74   BP Location: Right arm   Patient Position: Sitting   Cuff Size: Adult   Pulse: 81   Temp: 97 5 °F (36 4 °C)   TempSrc: Temporal   Weight: 47 2 kg (104 lb)   Height: 5' 1" (1 549 m)       Physical exam:     GENERAL: NAD, pleasant   HEENT:  NC/AT, PERRL, EOMI, no scleral icterus, notable erythema with tonsillar swelling appreciated on exam, no exudate appreciated  CARDIAC:  RRR, +S1/S2, no S3/S4 heard, no m/g/r  PULMONARY:  CTA B/L, some rhonchi bilaterally, non-labored breathing  ABDOMEN:  Soft, NT/ND, no rebound/guarding/rigidity  Extremities:  No edema, cyanosis, or clubbing  NEUROLOGIC: Grossly intact, No focal deficits  SKIN:  No rashes or erythema noted on exposed skin  Psych: Normal affect    ==  PLEASE NOTE:  This encounter was completed utilizing the M- Mod[employer]Fluency Direct Speech Voice Recognition Software  Grammatical errors, random word insertions, pronoun errors and incomplete sentences are occasional consequences of the system due to software limitations, ambient noise and hardware issues  These may be missed by proof reading prior to affixing electronic signature  Any questions or concerns about the content, text or information contained within the body of this dictation should be directly addressed to the physician for clarification  Please do not hesitate to call me directly if you have any any questions or concerns      DO Rebecca Garza 73 Internal Medicine PGY-3

## 2022-03-26 ENCOUNTER — HOSPITAL ENCOUNTER (EMERGENCY)
Facility: HOSPITAL | Age: 52
Discharge: HOME | End: 2022-03-26
Attending: EMERGENCY MEDICINE
Payer: COMMERCIAL

## 2022-03-26 VITALS
DIASTOLIC BLOOD PRESSURE: 63 MMHG | OXYGEN SATURATION: 96 % | TEMPERATURE: 98.4 F | BODY MASS INDEX: 19.65 KG/M2 | SYSTOLIC BLOOD PRESSURE: 116 MMHG | RESPIRATION RATE: 18 BRPM | WEIGHT: 104 LBS | HEART RATE: 95 BPM

## 2022-03-26 DIAGNOSIS — Z72.0 TOBACCO ABUSE: ICD-10-CM

## 2022-03-26 DIAGNOSIS — Z20.822 ENCOUNTER FOR LABORATORY TESTING FOR COVID-19 VIRUS: Primary | ICD-10-CM

## 2022-03-26 DIAGNOSIS — B34.9 VIRAL SYNDROME: ICD-10-CM

## 2022-03-26 PROCEDURE — U0003 INFECTIOUS AGENT DETECTION BY NUCLEIC ACID (DNA OR RNA); SEVERE ACUTE RESPIRATORY SYNDROME CORONAVIRUS 2 (SARS-COV-2) (CORONAVIRUS DISEASE [COVID-19]), AMPLIFIED PROBE TECHNIQUE, MAKING USE OF HIGH THROUGHPUT TECHNOLOGIES AS DESCRIBED BY CMS-2020-01-R: HCPCS | Performed by: EMERGENCY MEDICINE

## 2022-03-26 PROCEDURE — U0005 INFEC AGEN DETEC AMPLI PROBE: HCPCS | Performed by: EMERGENCY MEDICINE

## 2022-03-26 PROCEDURE — 99283 EMERGENCY DEPT VISIT LOW MDM: CPT

## 2022-03-26 PROCEDURE — 99282 EMERGENCY DEPT VISIT SF MDM: CPT | Performed by: EMERGENCY MEDICINE

## 2022-03-26 NOTE — ED ATTENDING ATTESTATION
3/26/2022  IAsh MD, saw and evaluated the patient  I have discussed the patient with the resident/non-physician practitioner and agree with the resident's/non-physician practitioner's findings, Plan of Care, and MDM as documented in the resident's/non-physician practitioner's note, except where noted  All available labs and Radiology studies were reviewed  I was present for key portions of any procedure(s) performed by the resident/non-physician practitioner and I was immediately available to provide assistance  At this point I agree with the current assessment done in the Emergency Department    I have conducted an independent evaluation of this patient a history and physical is as follows:  Has upper respiratory symptoms she states she took a home COVID test and was positive however implore her once in official COVID test  Patient was advised to stay out of work until this result is available  Patient has no complaints of shortness of breath no fever no chills  No abdominal symptoms and no diarrhea exam the patient is in no acute distress nasal congestion is noted  Lungs clear no wheezing heart regular no murmurs abdomen soft nontender  Impression viral illness upper respiratory infection  COVID swab  ED Course         Critical Care Time  Procedures

## 2022-03-26 NOTE — ED NOTES
Pt d/c by provider  All d/c instructions and work note  given by provider   Encouraged to return with any further concerns      Jaiden Ibarra RN  03/26/22 7288

## 2022-03-26 NOTE — Clinical Note
Nixon Burgess was seen and treated in our emergency department on 3/26/2022  Diagnosis:     James Livers    She may return on this date:     Covid test pending  May return to work according to employer protocol when feeling well  If you have any questions or concerns, please don't hesitate to call        Ruth Atkinson MD    ______________________________           _______________          _______________  Hospital Representative                              Date                                Time

## 2022-03-27 LAB — SARS-COV-2 RNA RESP QL NAA+PROBE: NEGATIVE

## 2022-03-27 NOTE — ED PROVIDER NOTES
History  Chief Complaint   Patient presents with    Sore Throat     pt tested COVID + yesterday sx sore throat cough and congestion sx worsening today      61-year-old female history of tobacco abuse presents seeking COVID test   Patient has had sore throat, increased cough, fevers myalgias  She performed home test and was positive  States that she needs a hospital test for return to work purposes  Works at [employer]  She denies chest pain, shortness of breath  She received 2 dose Pfizer vaccine last year  Other than smoking cigarettes denies other pulmonary history  Denies history of diabetes  ROS  Constitutional: fevers, chills  Eyes: denies eye pain  ENT: denies ear, sinus pain  CV: denies chest pain  Resp: denies cough  GI: denies abdominal pain, nausea, vomiting, diarrhea, bloody or dark stool  : denies difficulty urinating, flank pain  MSK: denies neck or back pain  Neuro: denies headache, new numbness, tingling, weakness  Allergy/Immuno: denies known drug allergies      Triage vital signs reviewed    Physical Exam  Const: alert, no acute distress, non-toxic appearing, no diaphoresis  Appears stated age, well-developed  Somewhat tired appearing  Eyes: no conjunctival injection, discharge or icterus  Head: normocephalic  Ears: auricles normal   Nose: normal  Mouth/throat: clear, moist   Neck: normal ROM, supple and without rigidity   CV: normal rate  Extremities warm and well-perfused   Resp: breathing unlabored, non-stridulous   Abdomen: soft, non-tender, non-distended  MSK: no gross deformities appreciated  Skin: warm and dry with rapid capillary refill  Neuro: CNs II-XII grossly intact  Oriented x 4  Sensation and motor function of extremities grossly intact  Psych: pleasant, cooperative            Prior to Admission Medications   Prescriptions Last Dose Informant Patient Reported? Taking?    azithromycin (ZITHROMAX) 250 mg tablet   No No   Sig: Take 2 tablets today then 1 tablet daily x 4 days cetirizine (ZyrTEC) 10 mg tablet   No No   Sig: Take 1 tablet (10 mg total) by mouth daily   fluticasone (FLONASE) 50 mcg/act nasal spray   No No   Si spray into each nostril daily      Facility-Administered Medications: None       Past Medical History:   Diagnosis Date    Abnormal Pap smear of cervix     HPV and PAP smear 2020 normal     Anxiety     Anxiety and depression     no longer on meds    Breast lump     hx of right breast lump in lumpectomy in     Depression     Migraine     Nicotine dependence     currently smoker, 33 pack year hx       Past Surgical History:   Procedure Laterality Date    BREAST EXCISIONAL BIOPSY Right     excisional- done in [address] [place] [address] LUMPECTOMY Right 2014    benign     SECTION      x's 4    COLONOSCOPY  2020    DILATION AND CURETTAGE, DIAGNOSTIC / THERAPEUTIC      pre cancerous cells?  MAMMO (HISTORICAL)  2018    MULTIPLE TOOTH EXTRACTIONS      X2    TUBAL LIGATION         Family History   Problem Relation Age of Onset    No Known Problems Daughter     No Known Problems Son     No Known Problems Son     No Known Problems Son     Hypertension Mother     Aneurysm Mother     No Known Problems Father     No Known Problems Sister     No Known Problems Maternal Grandmother     No Known Problems Maternal Grandfather     No Known Problems Paternal Grandmother     No Known Problems Paternal Grandfather     Stroke Brother     No Known Problems Brother     No Known Problems Maternal Aunt      I have reviewed and agree with the history as documented      E-Cigarette/Vaping    E-Cigarette Use Current Some Day User      E-Cigarette/Vaping Substances    Nicotine Yes     THC No     CBD No     Flavoring No     Other No     Unknown No      Social History     Tobacco Use    Smoking status: Current Every Day Smoker     Packs/day: 1 00     Years: 33 00     Pack years: 33 00     Types: Cigarettes    Smokeless tobacco: Never Used   Vaping Use    Vaping Use: Some days    Substances: Nicotine   Substance Use Topics    Alcohol use: Not Currently    Drug use: Never        Review of Systems    Physical Exam  ED Triage Vitals [03/26/22 1133]   Temperature Pulse Respirations Blood Pressure SpO2   98 4 °F (36 9 °C) 95 18 116/63 96 %      Temp Source Heart Rate Source Patient Position - Orthostatic VS BP Location FiO2 (%)   Oral Monitor Sitting Right arm --      Pain Score       No Pain             Orthostatic Vital Signs  Vitals:    03/26/22 1133   BP: 116/63   Pulse: 95   Patient Position - Orthostatic VS: Sitting       Physical Exam    ED Medications  Medications - No data to display    Diagnostic Studies  Results Reviewed     Procedure Component Value Units Date/Time    COVID only - 48 hour TAT [[id number]] Collected: 03/26/22 1232    Lab Status: In process Specimen: Nares from Nasopharyngeal Swab Updated: 03/26/22 1237                 No orders to display         Procedures  Procedures      ED Course                             SBIRT 20yo+      Most Recent Value   SBIRT (25 yo +)    In order to provide better care to our patients, we are screening all of our patients for alcohol and drug use  Would it be okay to ask you these screening questions? Unable to answer at this time Filed at: 03/26/2022 1233                MDM  Number of Diagnoses or Management Options  Encounter for laboratory testing for COVID-19 virus  Tobacco abuse  Viral syndrome  Diagnosis management comments: Covid send out  Declines symptomatic treatment  Work note  PCP follow-up, return precautions discussed  Patient appreciative and in agreement with plan         Disposition  Final diagnoses:   Encounter for laboratory testing for COVID-19 virus   Viral syndrome   Tobacco abuse     Time reflects when diagnosis was documented in both MDM as applicable and the Disposition within this note     Time User Action Codes Description Comment    3/26/2022 12:29 PM Giovanna Magila Croft Add [J79 978] Encounter for laboratory testing for COVID-19 virus     3/26/2022 12:29 PM Kristopher Hines Add [B34 9] Viral syndrome     3/26/2022 12:29 PM Kristopher Hines Add [Z72 0] Tobacco abuse       ED Disposition     ED Disposition Condition Date/Time Comment    Discharge Stable Sat Mar 26, 2022 12:27 PM Shante Ruiz discharge to home/self care  Follow-up Information     Follow up With Specialties Details Why Contact Info Additional Information    [address] [place] [address] 34 [facility] Emergency Department Emergency Medicine   [address]  958 [facility] 64 [facility] Emergency Department, [address], [facility] 108    [address]  Call  To find [phone]             Discharge Medication List as of 3/26/2022 12:43 PM      CONTINUE these medications which have NOT CHANGED    Details   azithromycin (ZITHROMAX) 250 mg tablet Take 2 tablets today then 1 tablet daily x 4 days, Normal      cetirizine (ZyrTEC) 10 mg tablet Take 1 tablet (10 mg total) by mouth daily, Starting Fri 3/25/2022, Normal      fluticasone (FLONASE) 50 mcg/act nasal spray 1 spray into each nostril daily, Starting Fri 3/25/2022, Normal           No discharge procedures on file  PDMP Review     None           ED Provider  Attending physically available and evaluated Shante Ruiz  MICHAEL managed the patient along with the ED Attending      Electronically Signed by         Thania Villareal MD  03/27/22 5324

## 2022-05-18 ENCOUNTER — HOSPITAL ENCOUNTER (OUTPATIENT)
Dept: MAMMOGRAPHY | Facility: CLINIC | Age: 52
Discharge: HOME | End: 2022-05-18
Payer: COMMERCIAL

## 2022-05-18 DIAGNOSIS — R92.8 ABNORMAL MAMMOGRAM: ICD-10-CM

## 2022-05-18 PROCEDURE — 77065 DX MAMMO INCL CAD UNI: CPT

## 2022-07-13 ENCOUNTER — TELEPHONE (OUTPATIENT)
Dept: INTERNAL MEDICINE CLINIC | Facility: CLINIC | Age: 52
End: 2022-07-13

## 2022-07-13 NOTE — TELEPHONE ENCOUNTER
I called patient to schedule her follow up with Dr Aly John  Patient is unable to come in for an appointment on Wednesdays  She can only do Thursday or Friday  I gave patient the phone number for [facility] to transfer her care so they are able to schedule her for either Thursday or Friday

## 2022-07-15 ENCOUNTER — OFFICE VISIT (OUTPATIENT)
Dept: INTERNAL MEDICINE CLINIC | Facility: CLINIC | Age: 52
End: 2022-07-15

## 2022-07-15 VITALS
SYSTOLIC BLOOD PRESSURE: 116 MMHG | WEIGHT: 100.8 LBS | TEMPERATURE: 98 F | HEART RATE: 86 BPM | BODY MASS INDEX: 19.03 KG/M2 | HEIGHT: 61 IN | DIASTOLIC BLOOD PRESSURE: 72 MMHG

## 2022-07-15 DIAGNOSIS — Z00.00 ANNUAL PHYSICAL EXAM: Primary | ICD-10-CM

## 2022-07-15 DIAGNOSIS — U07.1 COVID-19: ICD-10-CM

## 2022-07-15 DIAGNOSIS — Z00.00 HEALTHCARE MAINTENANCE: ICD-10-CM

## 2022-07-15 DIAGNOSIS — R82.90 CLOUDY URINE: ICD-10-CM

## 2022-07-15 DIAGNOSIS — F17.200 SMOKING: ICD-10-CM

## 2022-07-15 DIAGNOSIS — Z23 NEED FOR PNEUMOCOCCAL VACCINATION: ICD-10-CM

## 2022-07-15 DIAGNOSIS — E78.5 HYPERLIPIDEMIA, UNSPECIFIED HYPERLIPIDEMIA TYPE: ICD-10-CM

## 2022-07-15 DIAGNOSIS — J02.9 SORE THROAT: ICD-10-CM

## 2022-07-15 LAB
SARS-COV-2 AG UPPER RESP QL IA: POSITIVE
VALID CONTROL: ABNORMAL

## 2022-07-15 PROCEDURE — 99396 PREV VISIT EST AGE 40-64: CPT | Performed by: INTERNAL MEDICINE

## 2022-07-15 PROCEDURE — 87811 SARS-COV-2 COVID19 W/OPTIC: CPT | Performed by: INTERNAL MEDICINE

## 2022-07-15 PROCEDURE — 90677 PCV20 VACCINE IM: CPT | Performed by: INTERNAL MEDICINE

## 2022-07-15 PROCEDURE — 3008F BODY MASS INDEX DOCD: CPT | Performed by: INTERNAL MEDICINE

## 2022-07-15 PROCEDURE — 90471 IMMUNIZATION ADMIN: CPT | Performed by: INTERNAL MEDICINE

## 2022-07-15 RX ORDER — BUPROPION HYDROCHLORIDE 150 MG/1
TABLET ORAL
Qty: 90 TABLET | Refills: 3 | Status: SHIPPED | OUTPATIENT
Start: 2022-07-15 | End: 2022-10-03

## 2022-07-15 NOTE — PROGRESS NOTES
[facility] Visit Note  Camden Lassiter 46 y o  female   MRN: [id number]    Assessment and Plan      1  Annual physical exam       - see plan as below    2  Need for pneumococcal vaccination  -     Pneumococcal Conjugate Vaccine 20-valent (Pcv20)    3  COVID-19         - Patient complained of 1 day of fever, sore throat and mild cough  Rapid COVID test was positive  She denies any shortness of breath or chest pain  Will start 5 day course of Paxlovid  The patient was also instructed to quarantine for 5 days and to wear a mask for 5 days after  She was also instructed to go to the emergency department if she notices any significant respiratory distress  A work note was given at the end of the visit  -     POCT Rapid Covid Ag -> Positive        -     nirmatrelvir & ritonavir (Paxlovid) tablet therapy pack; Take 3 tablets by mouth 2 (two) times a day for 5 days Take 2 nirmatrelvir tablets + 1 ritonavir tablet together per dose    4  Smoking        - Patient continues to smoke 1 pack per day  She is interested in quitting, however she has failed nicotine gum, Nicoderm patches and Chantix  Will start patient on Wellbutrin 150 mg daily for the 1st 3 days followed by 150 mg b i d  thereafter  Will continue to  on smoking cessation at future visits  CT lung screen also ordered  -     CT lung screening program; Future; Expected date: 07/15/2022  -     buPROPion (Wellbutrin XL) 150 mg 24 hr tablet; Take 1 tablet (150 mg total) by mouth every morning for 3 days, THEN 1 tablet (150 mg total) 2 (two) times a day  5  Cloudy urine        - Today the patient complained of cloudy urine for several months  She denies any urinary burning or increased frequency  Will order UA for further evaluation   -     UA w Reflex to Microscopic w Reflex to Culture -Lab Collect; Future; Expected date: 07/15/2022    6   Hyperlipidemia, unspecified hyperlipidemia type        - Last lipid panel in 2020 showed elevated LDL and elevated cholesterol  Will repeat lipid panel   -     Lipid Panel with Direct LDL reflex; Future    7  Healthcare maintenance  -     Hepatitis C antibody; Future      Follow up in our clinic in 3 month(s)  Subjective   HISTORY OF PRESENT ILLNESS:  Elias Boyd is a 46 y o  female with a past medical history of tubular adenoma, migraines, tobacco abuse and anxiety/depression who presents to clinic today for an annual physical   Overall today the patient is feeling well  Her only medications include Zyrtec and Flonase which she rarely uses  She does continue to smoke around 1 pack of cigarettes per day  She is interested in quitting  She has tried Nicorette gum, Nicoderm patches and Chantix in the past without any relief  The Chantix gave her GI upset  Overall today she was feeling well  She does report a 1 day history of fever and sore throat with mild cough starting yesterday  Rapid COVID test was obtained in the clinic which was positive  She also complained of cloudy urine for the last several months  Denies any burning or increased frequency of urination  Otherwise overall she was feeling well and had no other acute complaints  She denies any recent fevers, chills, chest pain, shortness of breath or abdominal pain  Review of Systems   Constitutional: Positive for fever  Negative for activity change, appetite change, chills, diaphoresis and fatigue  HENT: Positive for sore throat  Negative for congestion, ear discharge, ear pain, hearing loss, sinus pressure and sinus pain  Eyes: Negative for pain, discharge, redness and itching  Respiratory: Positive for cough  Negative for chest tightness, shortness of breath and wheezing  Cardiovascular: Negative for chest pain, palpitations and leg swelling  Gastrointestinal: Negative for abdominal distention, abdominal pain, blood in stool, constipation, diarrhea, nausea and vomiting  Genitourinary: Negative for difficulty urinating, dysuria, flank pain, frequency, hematuria and urgency  Cloudy urine   Musculoskeletal: Negative for arthralgias, back pain and gait problem  Skin: Negative for color change, pallor and rash  Neurological: Negative for dizziness, weakness, light-headedness, numbness and headaches  Psychiatric/Behavioral: Negative for agitation, behavioral problems, confusion and decreased concentration  Objective     Vitals:    07/15/22 0953   BP: 116/72   BP Location: Right arm   Patient Position: Sitting   Cuff Size: Standard   Pulse: 86   Temp: 98 °F (36 7 °C)   TempSrc: Temporal   Weight: 45 7 kg (100 lb 12 8 oz)   Height: 5' 1" (1 549 m)     Physical Exam  Constitutional:       Appearance: She is well-developed  HENT:      Head: Normocephalic and atraumatic  Nose: Nose normal       Mouth/Throat:      Pharynx: No oropharyngeal exudate or posterior oropharyngeal erythema  Eyes:      Conjunctiva/sclera: Conjunctivae normal       Pupils: Pupils are equal, round, and reactive to light  Neck:      Vascular: No JVD  Cardiovascular:      Rate and Rhythm: Normal rate and regular rhythm  Heart sounds: Normal heart sounds  No murmur heard  No friction rub  No gallop  Pulmonary:      Effort: Pulmonary effort is normal  No respiratory distress  Breath sounds: Normal breath sounds  No stridor  No wheezing or rales  Chest:      Chest wall: No tenderness  Abdominal:      General: Bowel sounds are normal  There is no distension  Palpations: Abdomen is soft  There is no mass  Tenderness: There is no abdominal tenderness  There is no guarding or rebound  Hernia: No hernia is present  Musculoskeletal:         General: No tenderness or deformity  Right lower leg: No edema  Left lower leg: No edema  Skin:     General: Skin is warm and dry     Neurological:      Mental Status: She is alert and oriented to person, place, and time  Psychiatric:         Mood and Affect: Mood normal          Behavior: Behavior normal          Thought Content: Thought content normal          Judgment: Judgment normal          History     Current Outpatient Medications:     buPROPion (Wellbutrin XL) 150 mg 24 hr tablet, Take 1 tablet (150 mg total) by mouth every morning for 3 days, THEN 1 tablet (150 mg total) 2 (two) times a day , Disp: 90 tablet, Rfl: 3    nirmatrelvir & ritonavir (Paxlovid) tablet therapy pack, Take 3 tablets by mouth 2 (two) times a day for 5 days Take 2 nirmatrelvir tablets + 1 ritonavir tablet together per dose, Disp: 30 tablet, Rfl: 0    cetirizine (ZyrTEC) 10 mg tablet, Take 1 tablet (10 mg total) by mouth daily (Patient not taking: Reported on 7/15/2022), Disp: 30 tablet, Rfl: 1    fluticasone (FLONASE) 50 mcg/act nasal spray, 1 spray into each nostril daily (Patient not taking: Reported on 7/15/2022), Disp: 9 9 mL, Rfl: 1  No Known Allergies   Past Medical History:   Diagnosis Date    Abnormal Pap smear of cervix     HPV and PAP smear 2020 normal     Anxiety     Anxiety and depression     no longer on meds    Breast lump     hx of right breast lump in lumpectomy in     Depression     Migraine     Nicotine dependence     currently smoker, 33 pack year hx     Past Surgical History:   Procedure Laterality Date    BREAST EXCISIONAL BIOPSY Right     excisional- done in [address] [place] [address] LUMPECTOMY Right     benign     SECTION      x's 4    COLONOSCOPY  2020    DILATION AND CURETTAGE, DIAGNOSTIC / THERAPEUTIC      pre cancerous cells?     MAMMO (HISTORICAL)  2018    MULTIPLE TOOTH EXTRACTIONS      X2    TUBAL LIGATION       Social History     Socioeconomic History    Marital status: /Civil Union     Spouse name: Not on file    Number of children: Not on file    Years of education: 2 yr college    Highest education level: Not on file   Occupational History    Occupation: supervisor    Tobacco Use    Smoking status: Current Every Day Smoker     Packs/day: 1 00     Years: 33 00     Pack years: 33 00     Types: Cigarettes    Smokeless tobacco: Never Used   Vaping Use    Vaping Use: Some days    Substances: Nicotine   Substance and Sexual Activity    Alcohol use: Not Currently    Drug use: Never    Sexual activity: Not Currently     Partners: Male   Other Topics Concern    Not on file   Social History Narrative    Most recent tobacco use screenin2019    Do you currently or have you served in the [employer]: No    Were you activated, into active duty, as a member of the [employer] or as a Reservist: No    Occupation: supervisor    Education: [address] stress level: Medium    Exercise level: None    Diet: Regular    Marital status:     Sexual orientation: Heterosexual    Alcohol intake: None    Live alone or with others: with others    Caffeine intake: Moderate    coffee daily    Illicit drugs: Denies    Sexually active: Yes    Seat belts used routinely: Yes    Sunscreen used routinely: No     Social Determinants of Health     Financial Resource Strain: Low Risk     Difficulty of Paying Living Expenses: Not hard at all   Food Insecurity: No Food Insecurity    Worried About Running Out of Food in the Last Year: Never true    Xiomy of Food in the Last Year: Never true   Transportation Needs: No Transportation Needs    Lack of Transportation (Medical): No    Lack of Transportation (Non-Medical):  No   Physical Activity: Not on file   Stress: Not on file   Social Connections: Not on file   Intimate Partner Violence: Not on file   Housing Stability: Not on file     Family History   Problem Relation Age of Onset    No Known Problems Daughter     No Known Problems Son     No Known Problems Son     No Known Problems Son     Hypertension Mother     Aneurysm Mother     No Known Problems Father     No Known Problems Sister  No Known Problems Maternal Grandmother     No Known Problems Maternal Grandfather     No Known Problems Paternal Grandmother     No Known Problems Paternal Grandfather     Stroke Brother     No Known Problems Brother     No Known Problems Maternal Aunt        MD Rebecca Loza [facility] Internal Medicine PGY-3  [facility]  [address], [address] [facility]    PLEASE NOTE:  This encounter was completed utilizing the [employer] Direct Speech Voice Recognition Software  Grammatical errors, random word insertions, pronoun errors and incomplete sentences are occasional consequences of the system due to software limitations, ambient noise and hardware issues  These may be missed by proof reading prior to affixing electronic signature  Any questions or concerns about the content, text or information contained within the body of this dictation should be directly addressed to the physician for clarification  Please do not hesitate to call me directly if you have any any questions or concerns

## 2022-07-15 NOTE — LETTER
July 15, 2022     Patient: Beni Goldberg  YOB: 1970  Date of Visit: 7/15/2022      To Whom it May Concern:    Beni Goldberg is under my professional care  Alice Stuart was seen in my office on 7/15/2022  She tested positive for COVID  Alice Stuart may return to work on 7/20/22       If you have any questions or concerns, please don't hesitate to call           Sincerely,          Lala Celaya MD        CC: No Recipients

## 2022-07-15 NOTE — PATIENT INSTRUCTIONS
Please start taking Wellbutrin 150 mg daily in the morning for 3 days, followed by 150 mg twice daily for smoking cessation  Please obtain screening lung CT  Please obtain laboratory studies  Please obtain screening mammography  Please continue with lifestyle modifications including proper diet and exercise  Please quarantine for 5 days and wear a mask for 5 days later  Please go to the emergency department if he noticed any significant respiratory distress

## 2022-10-03 ENCOUNTER — ANNUAL EXAM (OUTPATIENT)
Dept: OBGYN CLINIC | Facility: CLINIC | Age: 52
End: 2022-10-03
Payer: COMMERCIAL

## 2022-10-03 VITALS
HEIGHT: 61 IN | SYSTOLIC BLOOD PRESSURE: 110 MMHG | DIASTOLIC BLOOD PRESSURE: 62 MMHG | WEIGHT: 103 LBS | BODY MASS INDEX: 19.45 KG/M2

## 2022-10-03 DIAGNOSIS — Z01.419 ENCOUNTER FOR ANNUAL ROUTINE GYNECOLOGICAL EXAMINATION: Primary | ICD-10-CM

## 2022-10-03 PROCEDURE — 99396 PREV VISIT EST AGE 40-64: CPT | Performed by: CLINICAL NURSE SPECIALIST

## 2022-10-03 NOTE — PROGRESS NOTES
Subjective:        Tip Asif is a 46 y o  female  Here for Gynecologic Exam (Pap- 20-neagtive -HPV  Mammogram- 21-left negative / Right Bi-Rad 3  Diagnostic- 22- Bi-rad 3  Scheduled mammogram for Bi-Rad 3  Colonoscopy- 20 +polyp  )      GYN HPI  Here for annual gyn exam  Regarding menstrual cycle: Menstrual patttern: still getting menses  She denies any abnormal vaginal complaints; and denies any abnormal urinary complaints    Denies changes or concerns r/t breasts  She sometimes performs self breast exams  She reports she generally eats a healthy diet and does exercise regularly, but active at work  She does not get dietary calcium/vit D  She denies any untreated or poorly controlled anxiety or depression sxs  She is sexually active  Contraception: tubal ligation  , She denies issues with intimacy  She reports that she does feel safe at home  The following portions of the patient's history were reviewed and updated as appropriate: allergies, current medications, past family history, past medical history, past social history, past surgical history, and problem list       Hereditary Cancer Screening  Family history reviewed and patient does not meet criteria for referral for genetic cancer assessment  Substance Abuse Screening Completed  See hx and flowsheet  Screens Positive for + tobacco use  Social History     Tobacco Use   Smoking Status Current Every Day Smoker    Packs/day: 1 00    Years: 33 00    Pack years: 33 00    Types: Cigarettes   Smokeless Tobacco Never Used                HEALTH MAINTENANCE SCREENINGS:    History of abnormal pap: No, Last Papanicolaou test:  2020  History of abnormal mammogram: Yes, benign findings, Last mammogram:  2022  Last Colon Cancer Screenin2020 Not on file Not on file        Review of Systems   Constitutional: Negative for appetite change, chills, fatigue, fever and unexpected weight change     HENT: Negative  Eyes: Negative  Respiratory: Negative for chest tightness and shortness of breath  Cardiovascular: Negative for chest pain and palpitations  Gastrointestinal: Negative for abdominal pain, constipation and vomiting  Endocrine: Negative for cold intolerance and heat intolerance  Genitourinary:        As per HPI   Musculoskeletal: Negative for back pain, joint swelling and neck pain  Skin: Negative for color change and rash  Neurological: Negative for dizziness, weakness and numbness  Hematological: Does not bruise/bleed easily  Psychiatric/Behavioral: Negative  Objective:  /62 (BP Location: Right arm, Patient Position: Sitting, Cuff Size: Standard)   Ht 5' 0 5" (1 537 m)   Wt 46 7 kg (103 lb)   LMP 09/26/2022 (Approximate)   BMI 19 78 kg/m²        Physical Exam  Constitutional:       General: She is not in acute distress  Appearance: Normal appearance  Genitourinary:      Vulva and rectum normal       No lesions in the vagina  Right Labia: No rash or lesions  Left Labia: No lesions or rash  No vaginal discharge, erythema, tenderness or bleeding  Right Adnexa: not tender and no mass present  Left Adnexa: not tender and no mass present  No cervical motion tenderness, discharge or friability  Uterus is not enlarged or tender  No urethral prolapse present  Pelvic exam was performed with patient in the lithotomy position  Breasts: Breasts are symmetrical       Right: No inverted nipple, mass, nipple discharge, skin change or tenderness  Left: No inverted nipple, mass, nipple discharge, skin change or tenderness  HENT:      Head: Normocephalic and atraumatic  Cardiovascular:      Rate and Rhythm: Normal rate  Heart sounds: No murmur heard  Pulmonary:      Effort: Pulmonary effort is normal       Breath sounds: Normal breath sounds  Abdominal:      General: There is no distension        Palpations: Abdomen is soft  Tenderness: There is no abdominal tenderness  Musculoskeletal:         General: Normal range of motion  Cervical back: Normal range of motion  Lymphadenopathy:      Cervical: No cervical adenopathy  Neurological:      Mental Status: She is alert and oriented to person, place, and time  Skin:     General: Skin is warm and dry  Psychiatric:         Mood and Affect: Mood normal          Behavior: Behavior normal    Vitals reviewed  Assessment/Plan:           [facility]- Primary  Annual GYN examination completed today  Risk prevention and anticipatory guidance provided including:   Pap/breast screenings- see below   Risk for hereditary cancers: Family history reviewed and patient does not qualify for referral for genetics consult/testing  Referral to genetics oncology offered as indicated   Calcium and vitamin D supplementation   Risk factors for lipid, diabetes and thryroid screening, ordered if needed   Dietary and lifestyle recommendations based on her age and weight  body mass index is 19 78 kg/m²  Gilson Berrios PHQ-2 depression screen completed  Reviewed and interventions recommended if needed   Tobacco and alcohol use, intervention ordered if applicable  Cervical cancer screening  Previous pap smears and ASCCP screening guidelines have been reviewed  Pap smear is not indicated at this time  Contraception   N/A- s/p permanent sterilization surgery    STI Screening  STI screening is declined  STI prevention discussed with use of condoms  Breast exam and breast cancer screening  Breast exam was done; , Reviewed recommendation for yearly screening mammogram (as per ACOG, ACS, and [address] [place] [address] Departments), however, also made her aware that there are other professional organizations that may recommend deferring mammograms until age 48 or screening every other year   CBE should still be done yearly, but may miss some cancers and alone is not as effective as breast imaging  Supplemental testing may also be indicated based on lifetime risk for breast cancer as done by [facility] , She is up to date with screening; Next due 11/22    Problem List Items Addressed This Visit    None     Visit Diagnoses     Encounter for annual routine gynecological examination    -  Primary          No orders of the defined types were placed in this encounter

## 2022-10-04 ENCOUNTER — HOSPITAL ENCOUNTER (EMERGENCY)
Facility: HOSPITAL | Age: 52
Discharge: HOME | End: 2022-10-04
Attending: EMERGENCY MEDICINE
Payer: COMMERCIAL

## 2022-10-04 VITALS
OXYGEN SATURATION: 97 % | DIASTOLIC BLOOD PRESSURE: 65 MMHG | RESPIRATION RATE: 20 BRPM | TEMPERATURE: 97.7 F | HEART RATE: 74 BPM | SYSTOLIC BLOOD PRESSURE: 133 MMHG
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
## 2022-10-04 DIAGNOSIS — S06.0XAA CONCUSSION: ICD-10-CM

## 2022-10-04 PROCEDURE — 99284 EMERGENCY DEPT VISIT MOD MDM: CPT | Performed by: EMERGENCY MEDICINE

## 2022-10-04 PROCEDURE — 99284 EMERGENCY DEPT VISIT MOD MDM: CPT

## 2022-10-04 RX ORDER — ACETAMINOPHEN 325 MG/1
650 TABLET ORAL ONCE
Status: COMPLETED | OUTPATIENT
Start: 2022-10-04 | End: 2022-10-04

## 2022-10-04 RX ORDER — ONDANSETRON 4 MG/1
4 TABLET, ORALLY DISINTEGRATING ORAL ONCE
Status: COMPLETED | OUTPATIENT
Start: 2022-10-04 | End: 2022-10-04

## 2022-10-04 RX ADMIN — ACETAMINOPHEN 650 MG: 325 TABLET, FILM COATED ORAL at 21:36

## 2022-10-04 RX ADMIN — ONDANSETRON 4 MG: 4 TABLET, ORALLY DISINTEGRATING ORAL at 21:36

## 2022-10-04 NOTE — Clinical Note
Ciera Pham was seen and treated in our emergency department on 10/4/2022  No restrictions            Diagnosis: concussion    Carrie Hernandez  may return to work on return date  She may return on this date: 10/09/2022         If you have any questions or concerns, please don't hesitate to call        Anoop Gonzalez,     ______________________________           _______________          _______________  Hospital Representative                              Date                                Time

## 2022-10-05 NOTE — DISCHARGE INSTRUCTIONS
Take Tylenol/Motrin for headache  Follow up with concussion clinic outpatient     If you have worsening pain, new onset weakness, numbness, tingling, vomiting, come back to the ED
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
## 2022-10-11 ENCOUNTER — OFFICE VISIT (OUTPATIENT)
Dept: INTERNAL MEDICINE CLINIC | Facility: CLINIC | Age: 52
End: 2022-10-11

## 2022-10-11 VITALS
SYSTOLIC BLOOD PRESSURE: 106 MMHG | OXYGEN SATURATION: 98 % | DIASTOLIC BLOOD PRESSURE: 70 MMHG | HEIGHT: 61 IN | HEART RATE: 81 BPM | TEMPERATURE: 98.5 F | WEIGHT: 102.4 LBS | BODY MASS INDEX: 19.33 KG/M2

## 2022-10-11 DIAGNOSIS — Z11.52 ENCOUNTER FOR SCREENING FOR COVID-19: Primary | ICD-10-CM

## 2022-10-11 DIAGNOSIS — J00 COMMON COLD: ICD-10-CM

## 2022-10-11 LAB
SARS-COV-2 AG UPPER RESP QL IA: NEGATIVE
VALID CONTROL: NORMAL

## 2022-10-11 PROCEDURE — 99214 OFFICE O/P EST MOD 30 MIN: CPT | Performed by: INTERNAL MEDICINE

## 2022-10-11 PROCEDURE — 87811 SARS-COV-2 COVID19 W/OPTIC: CPT | Performed by: INTERNAL MEDICINE

## 2022-10-11 RX ORDER — ALBUTEROL SULFATE 90 UG/1
2 AEROSOL, METERED RESPIRATORY (INHALATION) EVERY 6 HOURS PRN
Qty: 18 G | Refills: 5 | Status: SHIPPED | OUTPATIENT
Start: 2022-10-11

## 2022-10-11 RX ORDER — IPRATROPIUM BROMIDE 42 UG/1
2 SPRAY, METERED NASAL 4 TIMES DAILY
Qty: 15 ML | Refills: 0 | Status: SHIPPED | OUTPATIENT
Start: 2022-10-11

## 2022-10-11 NOTE — PATIENT INSTRUCTIONS
- We do not suggest any antibiotics or antivirals at the moment  - Will treat your symptoms with Inhaler + Atrovent nasal spray (for the cough and nasal symptoms)  - You do NOT have COVID, based on symptoms and a negative test at our office  - This is more likely than not a common cold, you should start having relief of symptoms in a week  HOWEVER, if you begin to have worsening facial pain and a fever, please call and return to the office so we can treat you for potential sinus infection
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
## 2022-11-18 ENCOUNTER — HOSPITAL ENCOUNTER (OUTPATIENT)
Dept: ULTRASOUND IMAGING | Facility: CLINIC | Age: 52
Discharge: HOME | End: 2022-11-18

## 2022-11-18 ENCOUNTER — HOSPITAL ENCOUNTER (OUTPATIENT)
Dept: MAMMOGRAPHY | Facility: CLINIC | Age: 52
Discharge: HOME | End: 2022-11-18

## 2022-11-18 VITALS — BODY MASS INDEX: 19.26 KG/M2 | HEIGHT: 61 IN | WEIGHT: 102 LBS

## 2022-11-18 DIAGNOSIS — R92.8 ABNORMAL FINDINGS ON DIAGNOSTIC IMAGING OF BREAST: ICD-10-CM

## 2022-11-18 DIAGNOSIS — R92.0 MAMMOGRAPHIC MICROCALCIFICATION: ICD-10-CM

## 2022-11-18 NOTE — PROGRESS NOTES
Met with patient and Dr Ashlee Frances    regarding recommendation for;      _____ RIGHT ___x___LEFT      _x____Ultrasound guided  ______Stereotactic  Breast biopsy  _x____Verbalized understanding  Blood thinners:  _____yes ___x__no    Date stopped: _____x______    Biopsy teaching sheet given:  __x_____yes ______no    Hannah Sanchez has history of benign lumpectomy in 2014  Consent reviewed

## 2022-12-15 ENCOUNTER — HOSPITAL ENCOUNTER (OUTPATIENT)
Dept: ULTRASOUND IMAGING | Facility: CLINIC | Age: 52
Discharge: HOME | End: 2022-12-15

## 2022-12-15 ENCOUNTER — HOSPITAL ENCOUNTER (OUTPATIENT)
Dept: MAMMOGRAPHY | Facility: CLINIC | Age: 52
Discharge: HOME | End: 2022-12-15

## 2022-12-15 VITALS — SYSTOLIC BLOOD PRESSURE: 119 MMHG | DIASTOLIC BLOOD PRESSURE: 73 MMHG | HEART RATE: 70 BPM

## 2022-12-15 DIAGNOSIS — Z98.890 STATUS POST BREAST BIOPSY: ICD-10-CM

## 2022-12-15 DIAGNOSIS — R92.8 ABNORMAL ULTRASOUND OF BREAST: ICD-10-CM

## 2022-12-15 RX ORDER — LIDOCAINE HYDROCHLORIDE 10 MG/ML
5 INJECTION, SOLUTION EPIDURAL; INFILTRATION; INTRACAUDAL; PERINEURAL ONCE
Status: COMPLETED | OUTPATIENT
Start: 2022-12-15 | End: 2022-12-15

## 2022-12-15 RX ADMIN — LIDOCAINE HYDROCHLORIDE 5 ML: 10 INJECTION, SOLUTION EPIDURAL; INFILTRATION; INTRACAUDAL; PERINEURAL at 08:55

## 2022-12-15 NOTE — PROGRESS NOTES
Procedure type:    _x____ultrasound guided _____stereotactic    Breast:    __x___Left _____Right    Location: 3 oclock 3 cmfn    Needle: 14g Quan    # of passes: 3    Clip: Ultraclip Ribbon    Performed by: Dr Alma Rosa Siu    Pressure held for 5 minutes by: Inocencia Roger Strips:    __x___yes _____no    Band aid:    __x___yes_____no    Tape and guaze:    _____yes __x___no    Tolerated procedure:    __x___yes _____no

## 2022-12-15 NOTE — DISCHARGE INSTR - OTHER ORDERS
POST LARGE CORE BREAST BIOPSY PATIENT INFORMATION      Place an ice pack inside your bra over the top of the dressing every hour for 20 minutes (20 minutes on, 60 minutes off)  Do this until bedtime  Do not shower or bathe until the following morning  After bathing, you may remove the bandaid  You may bathe your breast carefully with the steri-strips in place  Be careful not to loosen them  The steri-strips will fall off in 3-5 days  You may have mild discomfort, and you may have some bruising where the needle entered the skin  This should clear within 5-7 days  If you need medicine for discomfort, take acetaminophen products such as Tylenol  You may also take Advil or Motrin products  DO NOT use Aspirin for the first 24 hours  Do not participate in strenuous activities such as-tennis, aerobics, weight lifting, etc  for 24 hours  Refrain from swimming/soaking for 72 hours  Wearing a bra for sleeping may be more comfortable for the first 24-48 hours after your biopsy  Watch for continued bleeding, pain or fever over 101  If any of these symptoms occur, please contact our breast nurse navigator at the location where your biopsy was performed  During normal business hours (7:30am - 4:00pm) please call the nurse navigator at the site     where your procedure was performed:       [address] [facility] [address]: [phone] or      [address]: [phone] or [phone]     [address]: [address] [facility]: [address]: [phone]        After 4pm - please call your physician or go to the nearest Emergency Department location  The final results of your biopsy are usually available within one week

## 2022-12-15 NOTE — PROGRESS NOTES
Ice pack given:    __x___yes _____no    Discharge instructions signed by patient:    __x___yes _____no    Discharge instructions given to patient:    __x___yes _____no    Discharged via:    __x___ambulatory    _____wheelchair    _____stretcher    Stable on discharge:    __x__yes ____no
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
## 2022-12-22 ENCOUNTER — TELEPHONE (OUTPATIENT)
Dept: MAMMOGRAPHY | Facility: CLINIC | Age: 52
End: 2022-12-22

## 2022-12-22 NOTE — TELEPHONE ENCOUNTER
I just wanted to let you know that the above patient was given her negative breast biopsy results  The patient had no questions and was advised to return to yearly screenings  If you have any questions or need further assistance please let me know    Thank you,  Leatha Valles, [facility]  Breast Nurse Navigator

## 2023-06-05 ENCOUNTER — HOSPITAL ENCOUNTER (OUTPATIENT)
Dept: RADIOLOGY | Age: 53
Discharge: HOME | End: 2023-06-05
Payer: COMMERCIAL

## 2023-06-05 VITALS — BODY MASS INDEX: 18.5 KG/M2 | HEIGHT: 61 IN | WEIGHT: 98 LBS

## 2023-06-05 DIAGNOSIS — Z12.31 ENCOUNTER FOR SCREENING MAMMOGRAM FOR MALIGNANT NEOPLASM OF BREAST: ICD-10-CM

## 2023-06-05 PROCEDURE — 77063 BREAST TOMOSYNTHESIS BI: CPT

## 2023-06-05 PROCEDURE — 77067 SCR MAMMO BI INCL CAD: CPT

## 2023-06-10 ENCOUNTER — HOSPITAL ENCOUNTER (EMERGENCY)
Facility: HOSPITAL | Age: 53
Discharge: HOME | End: 2023-06-10
Attending: EMERGENCY MEDICINE
Payer: COMMERCIAL

## 2023-06-10 ENCOUNTER — APPOINTMENT (EMERGENCY)
Dept: RADIOLOGY | Facility: HOSPITAL | Age: 53
End: 2023-06-10
Payer: COMMERCIAL

## 2023-06-10 VITALS
SYSTOLIC BLOOD PRESSURE: 132 MMHG | RESPIRATION RATE: 16 BRPM | TEMPERATURE: 98 F | OXYGEN SATURATION: 95 % | DIASTOLIC BLOOD PRESSURE: 74 MMHG | HEART RATE: 82 BPM

## 2023-06-10 DIAGNOSIS — R10.9 FLANK PAIN: ICD-10-CM

## 2023-06-10 DIAGNOSIS — N12 PYELONEPHRITIS: Primary | ICD-10-CM

## 2023-06-10 LAB
ALBUMIN SERPL BCP-MCNC: 4 G/DL (ref 3.5–5)
ALP SERPL-CCNC: 75 U/L (ref 46–116)
ALT SERPL W P-5'-P-CCNC: 18 U/L (ref 12–78)
ANION GAP SERPL CALCULATED.3IONS-SCNC: 1 MMOL/L (ref 4–13)
AST SERPL W P-5'-P-CCNC: 15 U/L (ref 5–45)
BACTERIA UR QL AUTO: ABNORMAL /HPF
BASOPHILS # BLD AUTO: 0.03 THOUSANDS/ÂΜL (ref 0–0.1)
BASOPHILS NFR BLD AUTO: 0 % (ref 0–1)
BILIRUB SERPL-MCNC: 0.69 MG/DL (ref 0.2–1)
BILIRUB UR QL STRIP: NEGATIVE
BUN SERPL-MCNC: 12 MG/DL (ref 5–25)
CALCIUM SERPL-MCNC: 9.7 MG/DL (ref 8.3–10.1)
CHLORIDE SERPL-SCNC: 109 MMOL/L (ref 96–108)
CLARITY UR: CLEAR
CO2 SERPL-SCNC: 29 MMOL/L (ref 21–32)
COLOR UR: ABNORMAL
CREAT SERPL-MCNC: 0.71 MG/DL (ref 0.6–1.3)
EOSINOPHIL # BLD AUTO: 0.07 THOUSAND/ÂΜL (ref 0–0.61)
EOSINOPHIL NFR BLD AUTO: 1 % (ref 0–6)
ERYTHROCYTE [DISTWIDTH] IN BLOOD BY AUTOMATED COUNT: 14 % (ref 11.6–15.1)
GFR SERPL CREATININE-BSD FRML MDRD: 97 ML/MIN/1.73SQ M
GLUCOSE SERPL-MCNC: 86 MG/DL (ref 65–140)
GLUCOSE UR STRIP-MCNC: NEGATIVE MG/DL
HCT VFR BLD AUTO: 37.1 % (ref 34.8–46.1)
HGB BLD-MCNC: 11.8 G/DL (ref 11.5–15.4)
HGB UR QL STRIP.AUTO: NEGATIVE
IMM GRANULOCYTES # BLD AUTO: 0.02 THOUSAND/UL (ref 0–0.2)
IMM GRANULOCYTES NFR BLD AUTO: 0 % (ref 0–2)
KETONES UR STRIP-MCNC: NEGATIVE MG/DL
LEUKOCYTE ESTERASE UR QL STRIP: ABNORMAL
LIPASE SERPL-CCNC: 13 U/L (ref 11–82)
LYMPHOCYTES # BLD AUTO: 2.7 THOUSANDS/ÂΜL (ref 0.6–4.47)
LYMPHOCYTES NFR BLD AUTO: 31 % (ref 14–44)
MCH RBC QN AUTO: 28.6 PG (ref 26.8–34.3)
MCHC RBC AUTO-ENTMCNC: 31.8 G/DL (ref 31.4–37.4)
MCV RBC AUTO: 90 FL (ref 82–98)
MONOCYTES # BLD AUTO: 0.47 THOUSAND/ÂΜL (ref 0.17–1.22)
MONOCYTES NFR BLD AUTO: 5 % (ref 4–12)
NEUTROPHILS # BLD AUTO: 5.49 THOUSANDS/ÂΜL (ref 1.85–7.62)
NEUTS SEG NFR BLD AUTO: 63 % (ref 43–75)
NITRITE UR QL STRIP: POSITIVE
NON-SQ EPI CELLS URNS QL MICRO: ABNORMAL /HPF
NRBC BLD AUTO-RTO: 0 /100 WBCS
PH UR STRIP.AUTO: 6.5 [PH]
PLATELET # BLD AUTO: 316 THOUSANDS/UL (ref 149–390)
PMV BLD AUTO: 9.5 FL (ref 8.9–12.7)
POTASSIUM SERPL-SCNC: 3.8 MMOL/L (ref 3.5–5.3)
PROT SERPL-MCNC: 7.9 G/DL (ref 6.4–8.4)
PROT UR STRIP-MCNC: NEGATIVE MG/DL
RBC # BLD AUTO: 4.12 MILLION/UL (ref 3.81–5.12)
RBC #/AREA URNS AUTO: ABNORMAL /HPF
SODIUM SERPL-SCNC: 139 MMOL/L (ref 135–147)
SP GR UR STRIP.AUTO: 1.02 (ref 1–1.03)
UROBILINOGEN UR STRIP-ACNC: <2 MG/DL
WBC # BLD AUTO: 8.78 THOUSAND/UL (ref 4.31–10.16)
WBC #/AREA URNS AUTO: ABNORMAL /HPF
WBC CLUMPS # UR AUTO: PRESENT /UL

## 2023-06-10 PROCEDURE — 96365 THER/PROPH/DIAG IV INF INIT: CPT

## 2023-06-10 PROCEDURE — 99284 EMERGENCY DEPT VISIT MOD MDM: CPT

## 2023-06-10 PROCEDURE — 85025 COMPLETE CBC W/AUTO DIFF WBC: CPT | Performed by: STUDENT IN AN ORGANIZED HEALTH CARE EDUCATION/TRAINING PROGRAM

## 2023-06-10 PROCEDURE — 74177 CT ABD & PELVIS W/CONTRAST: CPT

## 2023-06-10 PROCEDURE — 83690 ASSAY OF LIPASE: CPT | Performed by: STUDENT IN AN ORGANIZED HEALTH CARE EDUCATION/TRAINING PROGRAM

## 2023-06-10 PROCEDURE — 81001 URINALYSIS AUTO W/SCOPE: CPT | Performed by: STUDENT IN AN ORGANIZED HEALTH CARE EDUCATION/TRAINING PROGRAM

## 2023-06-10 PROCEDURE — 36415 COLL VENOUS BLD VENIPUNCTURE: CPT | Performed by: STUDENT IN AN ORGANIZED HEALTH CARE EDUCATION/TRAINING PROGRAM

## 2023-06-10 PROCEDURE — 96367 TX/PROPH/DG ADDL SEQ IV INF: CPT

## 2023-06-10 PROCEDURE — 87086 URINE CULTURE/COLONY COUNT: CPT | Performed by: STUDENT IN AN ORGANIZED HEALTH CARE EDUCATION/TRAINING PROGRAM

## 2023-06-10 PROCEDURE — G1004 CDSM NDSC: HCPCS

## 2023-06-10 PROCEDURE — 80053 COMPREHEN METABOLIC PANEL: CPT | Performed by: STUDENT IN AN ORGANIZED HEALTH CARE EDUCATION/TRAINING PROGRAM

## 2023-06-10 PROCEDURE — 96375 TX/PRO/DX INJ NEW DRUG ADDON: CPT

## 2023-06-10 PROCEDURE — 87077 CULTURE AEROBIC IDENTIFY: CPT | Performed by: STUDENT IN AN ORGANIZED HEALTH CARE EDUCATION/TRAINING PROGRAM

## 2023-06-10 PROCEDURE — 90715 TDAP VACCINE 7 YRS/> IM: CPT | Performed by: STUDENT IN AN ORGANIZED HEALTH CARE EDUCATION/TRAINING PROGRAM

## 2023-06-10 PROCEDURE — 87186 SC STD MICRODIL/AGAR DIL: CPT | Performed by: STUDENT IN AN ORGANIZED HEALTH CARE EDUCATION/TRAINING PROGRAM

## 2023-06-10 RX ORDER — SODIUM CHLORIDE, SODIUM GLUCONATE, SODIUM ACETATE, POTASSIUM CHLORIDE, MAGNESIUM CHLORIDE, SODIUM PHOSPHATE, DIBASIC, AND POTASSIUM PHOSPHATE .53; .5; .37; .037; .03; .012; .00082 G/100ML; G/100ML; G/100ML; G/100ML; G/100ML; G/100ML; G/100ML
1000 INJECTION, SOLUTION INTRAVENOUS ONCE
Status: COMPLETED | OUTPATIENT
Start: 2023-06-10 | End: 2023-06-10

## 2023-06-10 RX ORDER — CEPHALEXIN 500 MG/1
500 CAPSULE ORAL EVERY 6 HOURS SCHEDULED
Qty: 40 CAPSULE | Refills: 0 | Status: SHIPPED | OUTPATIENT
Start: 2023-06-10 | End: 2023-06-20

## 2023-06-10 RX ORDER — ACETAMINOPHEN 325 MG/1
975 TABLET ORAL ONCE
Status: COMPLETED | OUTPATIENT
Start: 2023-06-10 | End: 2023-06-10

## 2023-06-10 RX ORDER — KETOROLAC TROMETHAMINE 30 MG/ML
15 INJECTION, SOLUTION INTRAMUSCULAR; INTRAVENOUS ONCE
Status: COMPLETED | OUTPATIENT
Start: 2023-06-10 | End: 2023-06-10

## 2023-06-10 RX ADMIN — SODIUM CHLORIDE, SODIUM GLUCONATE, SODIUM ACETATE, POTASSIUM CHLORIDE, MAGNESIUM CHLORIDE, SODIUM PHOSPHATE, DIBASIC, AND POTASSIUM PHOSPHATE 1000 ML: .53; .5; .37; .037; .03; .012; .00082 INJECTION, SOLUTION INTRAVENOUS at 13:01

## 2023-06-10 RX ADMIN — IOHEXOL 100 ML: 350 INJECTION, SOLUTION INTRAVENOUS at 13:55

## 2023-06-10 RX ADMIN — KETOROLAC TROMETHAMINE 15 MG: 30 INJECTION, SOLUTION INTRAMUSCULAR at 13:01

## 2023-06-10 RX ADMIN — TETANUS TOXOID, REDUCED DIPHTHERIA TOXOID AND ACELLULAR PERTUSSIS VACCINE, ADSORBED 0.5 ML: 5; 2.5; 8; 8; 2.5 SUSPENSION INTRAMUSCULAR at 15:31

## 2023-06-10 RX ADMIN — ACETAMINOPHEN 975 MG: 325 TABLET, FILM COATED ORAL at 13:01

## 2023-06-10 RX ADMIN — CEFTRIAXONE SODIUM 1000 MG: 10 INJECTION, POWDER, FOR SOLUTION INTRAVENOUS at 16:55

## 2023-06-10 NOTE — DISCHARGE INSTRUCTIONS
Please take antibiotic 4 times a day approximately 6 hours between doses for the next 10 days  Do not skip doses, and complete the full course  Continue to monitor symptoms including pain and fever at home  Please follow up with your primary care doctor for reevaluation and confirmation that infection has improved/resolved  Please return to the Emergency Department if you experience worsening of your current symptoms, high fevers, severe pain, nausea/vomiting, or any other concerning symptoms

## 2023-06-10 NOTE — ED CARE HANDOFF
Emergency Department Sign Out Note        Sign out and transfer of care from Dr Peggy Pearce  See Separate Emergency Department note  The patient, Raeann Leong, was evaluated by the previous provider for right flank pain  Workup Completed:  Labs and CT    ED Course / Workup Pending (followup):  UA, abx                                  ED Course as of 06/10/23 1722   Sat Dario 10, 2023   1531 SO: pending UA, R flank pain, ? pyelo on CT, Rocphin 1g here then dc on keflex   1651 Nitrite, UA(!): Positive  Rocephin 1g IV ordered, advised patient of likely findings  1721 Patient reevaluated, antibiotics complete  Patient reports significant improvement in pain  Denies any new or worsening complaints or concerns  Discussed results and plan with patient  Advised on need for outpatient follow up, given information  Given return precautions verbally and in discharge instructions  Given prescription for Keflex and advised on proper use  All questions answered  Patient expressed verbal understanding and is agreeable with plan for discharge with outpatient follow up  Procedures  Medical Decision Making  See ED course for additional details  Pyelonephritis: acute illness or injury  Amount and/or Complexity of Data Reviewed  External Data Reviewed: labs and radiology  Labs: ordered  Decision-making details documented in ED Course  Radiology: ordered  Risk  OTC drugs  Prescription drug management  Disposition  Final diagnoses:   Pyelonephritis     Time reflects when diagnosis was documented in both MDM as applicable and the Disposition within this note     Time User Action Codes Description Comment    6/10/2023  4:42 PM John Hernández Add [N12] Pyelonephritis       ED Disposition     ED Disposition   Discharge    Condition   Stable    Date/Time   Sat Dario 10, 2023  5:10 PM    Comment   Raeann Leong discharge to home/self care                 Follow-up Information     Follow up With Specialties Details Why Contact Info Additional Information    Nishant Lambert DO Internal Medicine Schedule an appointment as soon as possible for a visit in 10 days  [address] [facility] 55       [facility] Emergency Department Emergency Medicine Go to  If symptoms worsen [address] Emergency Department, [address] [place] [address], [address]        Patient's Medications   Discharge Prescriptions    CEPHALEXIN (KEFLEX) 500 MG CAPSULE    Take 1 capsule (500 mg total) by mouth every 6 (six) hours for 10 days       Start Date: 6/10/2023 End Date: 6/20/2023       Order Dose: 500 mg       Quantity: 40 capsule    Refills: 0     No discharge procedures on file         ED Provider  Electronically Signed by     Jacques Ronquillo DO  06/10/23 7408

## 2023-06-10 NOTE — ED PROVIDER NOTES
History  Chief Complaint   Patient presents with   • Flank Pain     For 2 days having right flank pain and chills  [facility] and advised to come to ER for possible kidney stone  Denies Sob, dizziness, chest pain, hematuria    • Leg Injury     2 days ago pt got a brush burn from dog leash  Pt is still having pain  HPI  70-year-old female presents with complaint of 2 days of right-sided flank pain  Patient states that the pain comes and goes and does not radiate  She denies any fever, chills, nausea, vomiting  She tried Tylenol for the pain which seems to minimally help she also states that position can make the pain worse  She also complains of a abrasion from a dog leash that she sustained 2 days ago as well  Abrasion is located on the right lateral leg  She is not up-to-date on tetanus as far she knows  She denies any other injury  Prior to Admission Medications   Prescriptions Last Dose Informant Patient Reported? Taking? albuterol (Ventolin HFA) 90 mcg/act inhaler   No No   Sig: Inhale 2 puffs every 6 (six) hours as needed for wheezing   ipratropium (ATROVENT) 0 06 % nasal spray   No No   Si sprays into each nostril 4 (four) times a day      Facility-Administered Medications: None       Past Medical History:   Diagnosis Date   • Abnormal Pap smear of cervix     HPV and PAP smear 2020 normal    • Anxiety    • Anxiety and depression     no longer on meds   • Breast lump     hx of right breast lump in lumpectomy in    • Depression    • Migraine    • Nicotine dependence     currently smoker, 33 pack year hx       Past Surgical History:   Procedure Laterality Date   • BREAST EXCISIONAL BIOPSY Right     excisional- done in [place]   • BREAST LUMPECTOMY Right     benign   •  SECTION      x's 4   • COLONOSCOPY  2020   • DILATION AND CURETTAGE, DIAGNOSTIC / THERAPEUTIC  1999    pre cancerous cells?    • MAMMO (HISTORICAL)  2018   • MULTIPLE TOOTH EXTRACTIONS      X2   • TUBAL LIGATION  1999   • US GUIDED BREAST BIOPSY LEFT COMPLETE Left 12/15/2022       Family History   Problem Relation Age of Onset   • No Known Problems Daughter    • No Known Problems Son    • No Known Problems Son    • No Known Problems Son    • Hypertension Mother    • Aneurysm Mother    • No Known Problems Father    • No Known Problems Sister    • No Known Problems Maternal Grandmother    • No Known Problems Maternal Grandfather    • No Known Problems Paternal Grandmother    • No Known Problems Paternal Grandfather    • Stroke Brother    • No Known Problems Brother    • No Known Problems Maternal Aunt      I have reviewed and agree with the history as documented  E-Cigarette/Vaping   • E-Cigarette Use Current Some Day User      E-Cigarette/Vaping Substances   • Nicotine Yes    • THC No    • CBD No    • Flavoring No    • Other No    • Unknown No      Social History     Tobacco Use   • Smoking status: Every Day     Packs/day: 0 50     Years: 33 00     Total pack years: 16 50     Types: Cigarettes   • Smokeless tobacco: Never   Vaping Use   • Vaping Use: Some days   • Substances: Nicotine   Substance Use Topics   • Alcohol use: Not Currently   • Drug use: Never        Review of Systems   Constitutional: Negative for chills and fever  HENT: Negative for congestion, ear pain, rhinorrhea and sore throat  Eyes: Negative for pain  Respiratory: Negative for apnea, cough, choking, chest tightness, shortness of breath, wheezing and stridor  Cardiovascular: Negative for chest pain, palpitations and leg swelling  Gastrointestinal: Negative for abdominal pain, constipation, diarrhea, nausea and vomiting  Genitourinary: Positive for flank pain  Negative for hematuria  Musculoskeletal: Negative for arthralgias and back pain  Skin: Positive for wound  Negative for rash  Neurological: Negative for dizziness  Psychiatric/Behavioral: Negative for agitation and hallucinations     All other systems reviewed and are negative  Physical Exam  ED Triage Vitals   Temperature Pulse Respirations Blood Pressure SpO2   06/10/23 1155 06/10/23 1155 06/10/23 1155 06/10/23 1155 06/10/23 1155   98 °F (36 7 °C) 88 18 97/67 100 %      Temp Source Heart Rate Source Patient Position - Orthostatic VS BP Location FiO2 (%)   06/10/23 1155 06/10/23 1155 06/10/23 1315 06/10/23 1155 --   Temporal Monitor Lying Left arm       Pain Score       06/10/23 1155       10 - Worst Possible Pain             Orthostatic Vital Signs  Vitals:    06/10/23 1155 06/10/23 1315 06/10/23 1645   BP: 97/67 128/69 132/74   Pulse: 88 74 82   Patient Position - Orthostatic VS:  Lying Sitting       Physical Exam  Vitals reviewed  Constitutional:       General: She is not in acute distress  Appearance: She is well-developed  HENT:      Head: Normocephalic and atraumatic  Right Ear: External ear normal       Left Ear: External ear normal       Nose: Nose normal  No congestion or rhinorrhea  Mouth/Throat:      Mouth: Mucous membranes are moist       Pharynx: No oropharyngeal exudate or posterior oropharyngeal erythema  Eyes:      General:         Right eye: No discharge  Left eye: No discharge  Extraocular Movements: Extraocular movements intact  Conjunctiva/sclera: Conjunctivae normal       Pupils: Pupils are equal, round, and reactive to light  Cardiovascular:      Rate and Rhythm: Normal rate and regular rhythm  Pulses: Normal pulses  Heart sounds: Normal heart sounds  Pulmonary:      Effort: Pulmonary effort is normal  No respiratory distress  Breath sounds: Normal breath sounds  No wheezing or rales  Abdominal:      Palpations: Abdomen is soft  Tenderness: There is abdominal tenderness (RUQ)  There is right CVA tenderness  Musculoskeletal:         General: No tenderness  Normal range of motion  Cervical back: Normal range of motion and neck supple  No rigidity  Skin:     General: Skin is warm  Findings: No rash  Comments: Abrasion/ burn injury to right lateral leg  Serosanguinous drainage, appropriate healing  Neurological:      General: No focal deficit present  Mental Status: She is alert and oriented to person, place, and time  Cranial Nerves: No cranial nerve deficit  Sensory: No sensory deficit  Motor: No weakness  Coordination: Coordination normal    Psychiatric:         Mood and Affect: Mood normal          ED Medications  Medications   multi-electrolyte (ISOLYTE-S PH 7 4) bolus 1,000 mL (0 mL Intravenous Stopped 6/10/23 1401)   ketorolac (TORADOL) injection 15 mg (15 mg Intravenous Given 6/10/23 1301)   acetaminophen (TYLENOL) tablet 975 mg (975 mg Oral Given 6/10/23 1301)   tetanus-diphtheria-acellular pertussis (BOOSTRIX) IM injection 0 5 mL (0 5 mL Intramuscular Given 6/10/23 1531)   iohexol (OMNIPAQUE) 350 MG/ML injection (SINGLE-DOSE) 100 mL (100 mL Intravenous Given 6/10/23 1355)   cefTRIAXone (ROCEPHIN) 1,000 mg in dextrose 5 % 50 mL IVPB (0 mg Intravenous Stopped 6/10/23 1725)       Diagnostic Studies  Results Reviewed     Procedure Component Value Units Date/Time    Lipase [[id number]]  (Normal) Collected: 06/10/23 1301    Lab Status: Final result Specimen: Blood from Arm, Right Updated: 06/10/23 1822     Lipase 13 u/L     Urine Microscopic [[id number]]  (Abnormal) Collected: 06/10/23 1531    Lab Status: Final result Specimen: Urine, Clean Catch Updated: 06/10/23 1626     RBC, UA 1-2 /hpf      WBC, UA 10-20 /hpf      Epithelial Cells Occasional /hpf      Bacteria, UA Innumerable /hpf      WBC Clumps Present    Urine culture [[id number]] Collected: 06/10/23 1531    Lab Status:  In process Specimen: Urine, Clean Catch Updated: 06/10/23 1626    UA w Reflex to Microscopic w Reflex to Culture [[id number]]  (Abnormal) Collected: 06/10/23 1531    Lab Status: Final result Specimen: Urine, Clean Catch Updated: 06/10/23 1615 Color, UA Light Yellow     Clarity, UA Clear     Specific Gravity, UA 1 025     pH, UA 6 5     Leukocytes, UA Small     Nitrite, UA Positive     Protein, UA Negative mg/dl      Glucose, UA Negative mg/dl      Ketones, UA Negative mg/dl      Urobilinogen, UA <2 0 mg/dl      Bilirubin, UA Negative     Occult Blood, UA Negative    CMP [[id number]]  (Abnormal) Collected: 06/10/23 1301    Lab Status: Final result Specimen: Blood from Arm, Right Updated: 06/10/23 1336     Sodium 139 mmol/L      Potassium 3 8 mmol/L      Chloride 109 mmol/L      CO2 29 mmol/L      ANION GAP 1 mmol/L      BUN 12 mg/dL      Creatinine 0 71 mg/dL      Glucose 86 mg/dL      Calcium 9 7 mg/dL      AST 15 U/L      ALT 18 U/L      Alkaline Phosphatase 75 U/L      Total Protein 7 9 g/dL      Albumin 4 0 g/dL      Total Bilirubin 0 69 mg/dL      eGFR 97 ml/min/1 73sq m     Narrative:      National Kidney Disease Foundation guidelines for Chronic Kidney Disease (CKD):   •  Stage 1 with normal or high GFR (GFR > 90 mL/min/1 73 square meters)  •  Stage 2 Mild CKD (GFR = 60-89 mL/min/1 73 square meters)  •  Stage 3A Moderate CKD (GFR = 45-59 mL/min/1 73 square meters)  •  Stage 3B Moderate CKD (GFR = 30-44 mL/min/1 73 square meters)  •  Stage 4 Severe CKD (GFR = 15-29 mL/min/1 73 square meters)  •  Stage 5 End Stage CKD (GFR <15 mL/min/1 73 square meters)  Note: GFR calculation is accurate only with a steady state creatinine    CBC and differential [[id number]] Collected: 06/10/23 1301    Lab Status: Final result Specimen: Blood from Arm, Right Updated: 06/10/23 1313     WBC 8 78 Thousand/uL      RBC 4 12 Million/uL      Hemoglobin 11 8 g/dL      Hematocrit 37 1 %      MCV 90 fL      MCH 28 6 pg      MCHC 31 8 g/dL      RDW 14 0 %      MPV 9 5 fL      Platelets 148 Thousands/uL      nRBC 0 /100 WBCs      Neutrophils Relative 63 %      Immat GRANS % 0 %      Lymphocytes Relative 31 %      Monocytes Relative 5 %      Eosinophils Relative 1 %      Basophils Relative 0 %      Neutrophils Absolute 5 49 Thousands/µL      Immature Grans Absolute 0 02 Thousand/uL      Lymphocytes Absolute 2 70 Thousands/µL      Monocytes Absolute 0 47 Thousand/µL      Eosinophils Absolute 0 07 Thousand/µL      Basophils Absolute 0 03 Thousands/µL                  CT abdomen pelvis with contrast   Final Result by Florina Munoz MD (06/10 8189)         1  Suspected right ureteritis and possible cystitis, correlate with urinalysis for urinary tract infection  2  Bilateral renal subcentimeter nonobstructing calculi  Workstation performed: [id number]               Procedures  Procedures      ED Course                             SBIRT 22yo+    Flowsheet Row Most Recent Value   Initial Alcohol Screen: US AUDIT-C     1  How often do you have a drink containing alcohol? 0 Filed at: 06/10/2023 1158   2  How many drinks containing alcohol do you have on a typical day you are drinking? 0 Filed at: 06/10/2023 1158   3a  Male UNDER 65: How often do you have five or more drinks on one occasion? 0 Filed at: 06/10/2023 1158   3b  FEMALE Any Age, or MALE 65+: How often do you have 4 or more drinks on one occassion? 0 Filed at: 06/10/2023 1158   Audit-C Score 0 Filed at: 06/10/2023 1158   [place]: How many times in the past year have you    Used an illegal drug or used a prescription medication for non-medical reasons? Never Filed at: 06/10/2023 1158                Medical Decision Making  58-year-old female presents with complaint of 2 days of right-sided flank pain    Pyelonephritis     Flank pain positive UA cystitis and ureteral inflammation on CT   Provided with abx given analgesia in ED  Given follow-up and return precautions   Amount and/or Complexity of Data Reviewed  Labs: ordered  Radiology: ordered  Risk  OTC drugs  Prescription drug management              Disposition  Final diagnoses:   Pyelonephritis   Flank pain     Time reflects when diagnosis was documented in both MDM as applicable and the Disposition within this note     Time User Action Codes Description Comment    6/10/2023  4:42 PM Becurtis Grewal Add [N12] Pyelonephritis     6/11/2023  6:54 AM Ewa Victor Add [R10 9] Flank pain       ED Disposition     ED Disposition   Discharge    Condition   Stable    Date/Time   Sat Dario 10, 2023  5:10 PM    Comment   Radha Lopez discharge to home/self care  Follow-up Information     Follow up With Specialties Details Why Contact Info Additional [address], DO Internal Medicine Schedule an appointment as soon as possible for a visit in 10 days  [address] [facility] 55       [facility] [address] Emergency Department Emergency Medicine Go to  If symptoms worsen [address] Emergency Department, [address] [facility], [place], [place], [address]          Discharge Medication List as of 6/10/2023  5:11 PM      START taking these medications    Details   cephalexin (KEFLEX) 500 mg capsule Take 1 capsule (500 mg total) by mouth every 6 (six) hours for 10 days, Starting Sat 6/10/2023, Until Tue 6/20/2023, Normal         CONTINUE these medications which have NOT CHANGED    Details   albuterol (Ventolin HFA) 90 mcg/act inhaler Inhale 2 puffs every 6 (six) hours as needed for wheezing, Starting Tue 10/11/2022, Normal      ipratropium (ATROVENT) 0 06 % nasal spray 2 sprays into each nostril 4 (four) times a day, Starting Tue 10/11/2022, Normal           No discharge procedures on file  PDMP Review     None           ED Provider  Attending physically available and evaluated Radha Lopez I managed the patient along with the ED Attending      Electronically Signed by         Ilir Melendez DO  06/11/23 9477

## 2023-06-10 NOTE — ED ATTENDING ATTESTATION
6/10/2023  IKayode MD, saw and evaluated the patient  I have discussed the patient with the resident/non-physician practitioner and agree with the resident's/non-physician practitioner's findings, Plan of Care, and MDM as documented in the resident's/non-physician practitioner's note, except where noted  All available labs and Radiology studies were reviewed  I was present for key portions of any procedure(s) performed by the resident/non-physician practitioner and I was immediately available to provide assistance  At this point I agree with the current assessment done in the Emergency Department  I have conducted an independent evaluation of this patient a history and physical is as follows:  Patient presents to the emergency department with chief complaint of right flank pain  The patient reports he had similar pain about a month ago that resolved without intervention  Now the patient has had right flank pain that was coming and going since Thursday  The patient has not identified any precipitants other than certain movements or twisting  The pain is in the right flank and does not radiate anywhere  The patient denies abdominal pain, back pain, chest pain, shortness of breath, nausea, diaphoresis, or headache  The patient denies fevers  The patient went to an urgent care center this morning who referred her to the emergency department for evaluation for possible kidney stone  The patient rates the pain is mild  The patient also reports a rope attached to her dog caused a superficial abrasion on her right calf 2 days ago  The patient reports that wound has been healing  The patient is unsure when her last tetanus shot was  Physical exam demonstrates a pleasant alert nontoxic female no acute distress  HEENT exam was normal   Lungs are clear with equal breath sounds  The heart had a regular rate and rhythm  The abdomen is soft and nontender    The right lateral calf had a superficial abrasion that was healing without evidence of infection  The back was nontender to palpation or percussion      ED Course         Critical Care Time  Procedures

## 2023-06-13 LAB — BACTERIA UR CULT: ABNORMAL

## 2023-10-03 NOTE — PROGRESS NOTES
Assessment   48 y.o. postmenopausal female presenting for annual exam.     Plan   Diagnoses and all orders for this visit:    Routine gynecological examination  Normal findings on routine exam.  Encouraged 150 min of exercise per week. Reviewed balanced diet. Multivitamin encouraged   Breast awareness/SBE encouraged     Inguinal lymphadenopathy    -     US groin/inguinal area; Future    Non-healing non-surgical wound  -     Ambulatory Referral to Dermatology; Future    Reports wound of right cheek of face not healing x little over a year. Scratched with a piece of glass - keeps scabbing over. Advised f/u with dermatology. Reports she has a dermatology appt scheduled in 3 months. Unable to see this appt scheduled. Referral given. Vaginal dryness, menopausal  -     estradiol (ESTRACE VAGINAL) 0.1 mg/g vaginal cream; 2g intravaginally at bedtime for one week, then 1g intravaginally 2 times per week    We discussed tissue changes in menopause . She does desire a trial of estrace cream. Discussed risks/benefits and instructions for use. Rx provided. Reviewed 2g intravaginally at bedtime for one week, then 1g intravaginally 2-3 times per week. Reviewed she can also use vaginal moisturizer separate from estrace. Silicone based lubricant or coconut oil for during IC. F/u if sx persist or worsening. Pap - due 2025  Mammo slip given    Colonoscopy due 2025    RTO one year for yearly exam or sooner as needed.       __________________________________________________________________      Rachael Quarles is a 48 y.o. postmenopausal female presenting for annual exam.       SCREENING  Last Pap: 07/16/2020 NILM/hpv neg   Last Mammo: 06/05/2023 BIRADS 1 - Negative (had breast biopsy 12/2022 - benign)   Last Colonoscopy: 08/04/2020 5 year recall per telephone encounter (4/24/23)     GYN  Denies postmenopausal vaginal bleeding, dryness, vaginal discharge, itching, odor, pelvic pain and vulvar/vaginal symptoms    Menarche at age 10-15  Menopause at age 46 (LMP 2021)  Menopausal symptoms occasional VM sx - not bothersome, vaginal dryness     Sexually active: No - due to pain stemming from vaginal dryness. No bleeding. Hx Abnormal pap: denies  We reviewed ASCCP guidelines for Pap testing today. OB  [id number]      Complaints: denies  Denies leakage/difficulty urinating, dysuria, hematuria, and urinary frequency/urgency    BREAST  Complaints: denies  Denies: breast lump, breast tenderness, dryness, nipple discharge, pruritus, skin color change, and skin lesion(s)    Pertinent Family Hx:   Family hx of breast cancer: no  Family hx of ovarian cancer: no  Family hx of colon cancer: no      Past Medical History:   Diagnosis Date   • Abnormal Pap smear of cervix     HPV and PAP smear 2020 normal    • Anxiety    • Anxiety and depression     no longer on meds   • Breast lump     hx of right breast lump in lumpectomy in    • Depression    • Migraine    • Nicotine dependence     currently smoker, 33 pack year hx       Past Surgical History:   Procedure Laterality Date   • BREAST EXCISIONAL BIOPSY Right     excisional- done in [place]   • BREAST LUMPECTOMY Right     benign   •  SECTION      x's 4   • COLONOSCOPY  2020   • DILATION AND CURETTAGE, DIAGNOSTIC / THERAPEUTIC      pre cancerous cells?    • MAMMO (HISTORICAL)  2018   • MULTIPLE TOOTH EXTRACTIONS      X2   • TUBAL LIGATION     • US GUIDED BREAST BIOPSY LEFT COMPLETE Left 12/15/2022         Current Outpatient Medications:   •  albuterol (Ventolin HFA) 90 mcg/act inhaler, Inhale 2 puffs every 6 (six) hours as needed for wheezing, Disp: 18 g, Rfl: 5  •  estradiol (ESTRACE VAGINAL) 0.1 mg/g vaginal cream, 2g intravaginally at bedtime for one week, then 1g intravaginally 2 times per week, Disp: 42.5 g, Rfl: 3  •  ipratropium (ATROVENT) 0.06 % nasal spray, 2 sprays into each nostril 4 (four) times a day, Disp: 15 mL, Rfl: 0    No Known Allergies    Social History     Socioeconomic History   • Marital status: /Civil Union     Spouse name: Not on file   • Number of children: Not on file   • Years of education: 2 yr college   • Highest education level: Not on file   Occupational History   • Occupation: supervisor    Tobacco Use   • Smoking status: Every Day     Packs/day: 0.50     Years: 33.00     Total pack years: 16.50     Types: Cigarettes   • Smokeless tobacco: Never   Vaping Use   • Vaping Use: Some days   • Substances: Nicotine   Substance and Sexual Activity   • Alcohol use: Not Currently   • Drug use: Never   • Sexual activity: Not Currently     Partners: Male   Other Topics Concern   • Not on file   Social History Narrative    Most recent tobacco use screenin2019    Do you currently or have you served in the [address] [place] [address] [employer]: No    Were you activated, into active duty, as a member of the [employer] or as a Reservist: No    Occupation: supervisor    Education: 1959 [employer] stress level: Medium    Exercise level: None    Diet: Regular    Marital status:     Sexual orientation: Heterosexual    Alcohol intake: None    Live alone or with others: with others    Caffeine intake: Moderate    coffee daily    Illicit drugs: Denies    Sexually active: Yes    Seat belts used routinely: Yes    Sunscreen used routinely: No     Social Determinants of Health     Financial Resource Strain: Low Risk  (3/25/2022)    Overall Financial Resource Strain (CARDIA)    • Difficulty of Paying Living Expenses: Not hard at all   Food Insecurity: No Food Insecurity (3/25/2022)    Hunger Vital Sign    • Worried About Running Out of Food in the Last Year: Never true    • Ran Out of Food in the Last Year: Never true   Transportation Needs: No Transportation Needs (3/25/2022)    PRAPARE - Transportation    • Lack of Transportation (Medical): No    • Lack of Transportation (Non-Medical):  No   Physical Activity: Sufficiently Active (4/13/2021)    Exercise Vital Sign    • Days of Exercise per Week: 4 days    • Minutes of Exercise per Session: 150+ min   Stress: Not on file   Social Connections: Not on file   Intimate Partner Violence: Not on file   Housing Stability: Not on file         Review of Systems   Constitutional: Negative. Respiratory: Negative. Cardiovascular: Negative   Gastrointestinal: Negative   Breasts: As noted above. Genitourinary: As noted above. Psychiatric: Negative       Objective      /64 (BP Location: Left arm, Patient Position: Sitting, Cuff Size: Standard)   Ht 5' 1" (1.549 m)   Wt 44.1 kg (97 lb 3.2 oz)   LMP  (LMP Unknown)   BMI 18.37 kg/m²     Physical Examination:  Patient appears well and is not in distress  1 cm erythematous papular lesion with central scab formation. Breasts are symmetrical without mass, tenderness, nipple discharge, skin changes or adenopathy. Abdomen is soft and nontender without masses. External genitals are without lesions or rashes. Two pea sized lymph nodes of inguinal canals b/l. Both smooth and round. Right side immobile, left side mobile. Urethral meatus and urethra are normal  Bladder is normal to palpation  Vagina is normal without discharge or bleeding. Cervix is normal without discharge or lesion. Uterus is normal, mobile, nontender without palpable mass. Adnexa are normal, nontender, without palpable mass.

## 2023-10-04 ENCOUNTER — ANNUAL EXAM (OUTPATIENT)
Dept: OBGYN CLINIC | Facility: CLINIC | Age: 53
End: 2023-10-04
Payer: COMMERCIAL

## 2023-10-04 VITALS
HEIGHT: 61 IN | DIASTOLIC BLOOD PRESSURE: 64 MMHG | WEIGHT: 97.2 LBS | BODY MASS INDEX: 18.35 KG/M2 | SYSTOLIC BLOOD PRESSURE: 106 MMHG

## 2023-10-04 DIAGNOSIS — T14.8XXA NON-HEALING NON-SURGICAL WOUND: ICD-10-CM

## 2023-10-04 DIAGNOSIS — Z01.419 ROUTINE GYNECOLOGICAL EXAMINATION: Primary | ICD-10-CM

## 2023-10-04 DIAGNOSIS — R59.0 INGUINAL LYMPHADENOPATHY: ICD-10-CM

## 2023-10-04 DIAGNOSIS — N95.1 VAGINAL DRYNESS, MENOPAUSAL: ICD-10-CM

## 2023-10-04 PROCEDURE — S0612 ANNUAL GYNECOLOGICAL EXAMINA: HCPCS | Performed by: PHYSICIAN ASSISTANT

## 2023-10-04 RX ORDER — ESTRADIOL 0.1 MG/G
CREAM VAGINAL
Qty: 42.5 G | Refills: 3 | Status: SHIPPED | OUTPATIENT
Start: 2023-10-04

## 2023-10-04 NOTE — PATIENT INSTRUCTIONS
Vaginal moisturizers (prevention)     Coconut oil (organic, pure, unscented) as needed for moisture or lubrication. Revaree hyaluronic acid suppository   Replens moisture restore external comfort gel daily ( use as directed on the box)    Replens long lasting vaginal moisturizer  ( use as directed on the box)       Vaginal Lubricants (during intercourse):     Coconut oil (Do not use if allergic)          Silicone based lubricant:  Uber Lube  AstroGlide  Replens silky smooth lubricant, premium silicone based lubricant for intercourse.  ( use as directed, a small amount will provide an enhanced natural feeling)

## 2023-10-11 ENCOUNTER — HOSPITAL ENCOUNTER (OUTPATIENT)
Dept: RADIOLOGY | Facility: HOSPITAL | Age: 53
Discharge: HOME | End: 2023-10-11
Payer: COMMERCIAL

## 2023-10-11 DIAGNOSIS — R59.0 INGUINAL LYMPHADENOPATHY: ICD-10-CM

## 2023-10-11 PROCEDURE — 76705 ECHO EXAM OF ABDOMEN: CPT

## 2023-11-16 ENCOUNTER — HOSPITAL ENCOUNTER (OUTPATIENT)
Dept: RADIOLOGY | Facility: HOSPITAL | Age: 53
Discharge: HOME | End: 2023-11-16
Payer: COMMERCIAL

## 2023-11-16 ENCOUNTER — OFFICE VISIT (OUTPATIENT)
Dept: INTERNAL MEDICINE CLINIC | Facility: CLINIC | Age: 53
End: 2023-11-16

## 2023-11-16 VITALS
WEIGHT: 98 LBS | SYSTOLIC BLOOD PRESSURE: 117 MMHG | BODY MASS INDEX: 18.52 KG/M2 | HEART RATE: 80 BPM | TEMPERATURE: 97.6 F | DIASTOLIC BLOOD PRESSURE: 73 MMHG

## 2023-11-16 DIAGNOSIS — Z13.1 SCREENING FOR DIABETES MELLITUS: ICD-10-CM

## 2023-11-16 DIAGNOSIS — M72.2 PLANTAR FIBROMATOSIS: ICD-10-CM

## 2023-11-16 DIAGNOSIS — Z11.59 NEED FOR HEPATITIS C SCREENING TEST: ICD-10-CM

## 2023-11-16 DIAGNOSIS — Z00.00 ANNUAL PHYSICAL EXAM: Primary | ICD-10-CM

## 2023-11-16 DIAGNOSIS — M25.512 CHRONIC LEFT SHOULDER PAIN: ICD-10-CM

## 2023-11-16 DIAGNOSIS — Z23 ENCOUNTER FOR IMMUNIZATION: ICD-10-CM

## 2023-11-16 DIAGNOSIS — F17.219 CIGARETTE NICOTINE DEPENDENCE WITH NICOTINE-INDUCED DISORDER: ICD-10-CM

## 2023-11-16 DIAGNOSIS — L98.9 SKIN LESION OF CHEEK: ICD-10-CM

## 2023-11-16 DIAGNOSIS — G89.29 CHRONIC LEFT SHOULDER PAIN: ICD-10-CM

## 2023-11-16 PROCEDURE — 73030 X-RAY EXAM OF SHOULDER: CPT

## 2023-11-16 RX ORDER — BUPROPION HYDROCHLORIDE 150 MG/1
150 TABLET ORAL EVERY MORNING
Qty: 30 TABLET | Refills: 5 | Status: SHIPPED | OUTPATIENT
Start: 2023-11-16 | End: 2024-05-14

## 2023-11-16 NOTE — PROGRESS NOTES
[address]    NAME: Rosenda Sabillon  AGE: 48 y.o. SEX: female  : 1970     DATE: 2023     Assessment and Plan:     Hammad Porter was seen today for annual exam.    Diagnoses and all orders for this visit:    Annual physical exam: Annual physical exam completed at today's visit. Medication reconciliation completed. She is up-to-date on breast cancer , cervical cancer , and colon cancer screening. She does have history of sigmoid tubular adenoma in 2020 and is due for repeat in . Will order follow-up CT lung cancer screening for her smoking history. See below for problem specific assessment and plans.   -     Lipid panel; Future  -     Hemoglobin A1C; Future  -     Vitamin D 25 hydroxy; Future    Plantar fibromatosis: Patient saw podiatry in  for plantar foot lump. X-ray at that time showed no acute abnormality, small calcaneal spur. Podiatry diagnosed her with plantar fibroma. Recommended supportive care, home stretching/exercises, icing, and arch support. They discussed injection vs surgical treatment if conservative measures fail. Today she reports ongoing discomfort of her right plantar foot fibroma. She has failed conservative treatment including creams, home stretches/exercises, and icing. She requests referral to podiatry for surgical removal.   Referral to podiatry for consideration of surgical removal   -     Ambulatory Referral to Podiatry; Future    Cigarette nicotine dependence with nicotine-induced disorder: She has an approximate 35 pack year history. She continues to smoke 1 pack/day. She previously failed Chantix, patches, and gum/lozenges. She prematurel stopped taking Wellbutrin ordered by Dr. Ry Segura in 2022 for unclear reasons. After explaining how the medication works in addition to helping with depression, she is extremely interested in starting this again.   She had CT lung screening done in April 2021 that was completely normal without nodules. She is now overdue for repeat, she is agreeable to repeating at this time. Restart Wellbutrin 150 mg daily  Continue to recommend smoking cessation  Follow-up CT lung screening  -     buPROPion (WELLBUTRIN XL) 150 mg 24 hr tablet; Take 1 tablet (150 mg total) by mouth every morning  -     CT lung screening program; Future    Skin lesion of cheek: She reports skin lesion on her right cheek (see media tab for picture imported from today's visit). Has been present for 1.5 years after sustaining a laceration from a glass bottle. Ever since then, the lesion has persisted. It is not growing in size, no other new skin lesions. Every couple weeks to month she will pick the lesion like a scab and it will bleed. She denies personal or family history of skin cancer. She denies weight loss. She reportedly has upcoming appointment with dermatology out of the network scheduled for January 3, 2024; however she would like to see someone sooner. I gave her the information/flyer for our [address] [place] [address] dermatology complementary skin check on December 8 and she states that she will be free the day to go to it. She also still requests referral to dermatology because she would like to establish care here. -     Ambulatory Referral to Dermatology; Future    Chronic left shoulder pain: At the end of visit, she relates that she has had left shoulder pain ongoing for a few months. Denies traumatic inciting event. Exam notable for pain on active range of motion in shoulder abduction, flexion. She does have positive provacative testing including Neer and empty can signs, which  could be consistent with impingement syndrome. Obtain x-ray left shoulder  Conservative measures for now  Consider additional imaging pending above work-up and response to treatment  -     XR shoulder 2+ vw left;  Future    Screening for diabetes mellitus  - Hemoglobin A1C; Future    Need for hepatitis C screening test  -     Hepatitis C Ab W/Refl To HCV RNA, Qn, PCR; Future    Encounter for immunization  -     influenza vaccine, quadrivalent, 0.5 mL, preservative-free, for adult and pediatric patients 6 mos+ (AFLURIA, FLUARIX, FLULAVAL, FLUZONE)     Problem List Items Addressed This Visit          Nervous and Auditory    Cigarette nicotine dependence with nicotine-induced disorder    Relevant Medications    buPROPion (WELLBUTRIN XL) 150 mg 24 hr tablet    Other Relevant Orders    CT lung screening program       Other    Annual physical exam - Primary    Relevant Orders    Lipid panel    Hemoglobin A1C    Vitamin D 25 hydroxy     Other Visit Diagnoses       Plantar fibromatosis        Relevant Orders    Ambulatory Referral to Podiatry    Skin lesion of cheek        Relevant Orders    Ambulatory Referral to Dermatology    Chronic left shoulder pain        Relevant Orders    XR shoulder 2+ vw left    Screening for diabetes mellitus        Relevant Orders    Hemoglobin A1C    Need for hepatitis C screening test        Relevant Orders    Hepatitis C Ab W/Refl To HCV RNA, Qn, PCR    Encounter for immunization        Relevant Orders    influenza vaccine, quadrivalent, 0.5 mL, preservative-free, for adult and pediatric patients 6 mos+ (AFLURIA, FLUARIX, FLULAVAL, FLUZONE) (Completed)            Immunizations and preventive care screenings were discussed with patient today. Appropriate education was printed on patient's after visit summary. Counseling:  Alcohol/drug use: discussed moderation in alcohol intake, the recommendations for healthy alcohol use, and avoidance of illicit drug use. Dental Health: discussed importance of regular tooth brushing, flossing, and dental visits. Sexual health: discussed sexually transmitted diseases, partner selection, use of condoms, avoidance of unintended pregnancy, and contraceptive alternatives.   Exercise: the importance of regular exercise/physical activity was discussed. Recommend exercise 3-5 times per week for at least 30 minutes. Return in 6 months (on 5/16/2024). Chief Complaint:     Chief Complaint   Patient presents with    Annual Exam     Annual physical       History of Present Illness:     Adult Annual Physical   Patient here for a comprehensive physical exam. She has a past medical history notable for hyperlipidemia, migraines, anxiety/depression, plantar fibroma, childhood asthma, and tobacco use. Interval history:   Patient saw podiatry in January '22 for plantar foot lump - x-ray showed no acute abnormality, small calcaneal spur. Podiatry diagnosed her with plantar fibroma. Recommended supportive care, home stretching/exercises, icing, arch support. Considered injection and surgical treatment if conservative measures fail   Saw us in clinic in July '22 and was started on Wellbutrin for smoking cessation after falling Chantix, patches. ED visit in June '23 for right flank pain. CT showed cystitis and right ureteritis with bilateral subcentimeter nonobstructing renal calculi and suspected fatty liver. Last labs from June '23 during ED visit - normal CBC, CMP    Today, the patient reports a few problems. First, she reports ongoing discomfort of her right plantar foot fibroma. The lesion has persisted and not changing in size or appearance. It is subcutaneous without any overlying skin changes. She has failed conservative treatment including creams, home stretches/exercises, and icing. She requests referral to podiatry for surgical removal.     She also reports skin lesion on her right cheek (see media tab for picture imported from today's visit). She states the lesion has been present for 1.5 years after a glass bottle fell on her cheek and caused a laceration. Ever since then, the lesion has persisted. It is not growing in size.   Every couple weeks to month she will pick the lesion like a scab and it will bleed.  She denies personal or family history of skin cancer. She denies weight loss. She reports having upcoming appointment with dermatology out of the network scheduled for January 3, 2024, however she would like to see someone sooner. I gave her the information/flyer for our [address] [facility] dermatology complementary skin check on December 8 and she states that she will be free the day to go to it. She continues to smoke 1 pack/day. Total pack-year history is approximately 35 years. She previously failed Chantix, patches, and gum/lozenges. She vaguely recalls starting the Wellbutrin ordered by Dr. Severiano Gear in July 2022, she thinks July took this medication for couple days. After explaining on the medication works in addition to helping with depression, she is extremely interested in starting this again. Her goal is to quit smoking at this time. Also regarding her smoking history, she had CT lung screening done in April 2021 that was completely normal without nodules. She is now overdue for repeat, she is agreeable to repeating at this time. Regarding other cancer screenings, she is up-to-date on breast cancer , cervical cancer , and colon cancer screening. She does have history of sigmoid tubular adenoma in August 2020 and is due for repeat in 2025. At the end of visit, she relates that she has had left shoulder pain ongoing for a few months. Denies traumatic inciting event. It hurts to move her shoulder in pretty much all directions. Diet and Physical Activity  Diet/Nutrition: well balanced diet. Exercise: no formal exercise. Depression Screening  PHQ-2/9 Depression Screening           General Health  Sleep: sleeps well. Hearing: normal - bilateral.  Vision: no vision problems. Dental: regular dental visits. /GYN Health  Follows with gynecology?  Up to date on cervical cancer screening  Patient is: perimenopausal     Review of Systems:     Review of Systems - Negative other than stated above     Past Medical History:     Past Medical History:   Diagnosis Date    Abnormal Pap smear of cervix     HPV and PAP smear 2020 normal     Anxiety     Anxiety and depression     no longer on meds    Breast lump     hx of right breast lump in lumpectomy in     Depression     Migraine     Nicotine dependence     currently smoker, 33 pack year hx      Past Surgical History:     Past Surgical History:   Procedure Laterality Date    BREAST EXCISIONAL BIOPSY Right 2014    excisional- done in [address] [place] [address] Right 2014    benign     SECTION      x's 4    COLONOSCOPY  2020    DILATION AND CURETTAGE, DIAGNOSTIC / THERAPEUTIC      pre cancerous cells?     MAMMO (HISTORICAL)  2018    MULTIPLE TOOTH EXTRACTIONS      X2    TUBAL LIGATION      US GUIDED BREAST BIOPSY LEFT COMPLETE Left 12/15/2022      Social History:     Social History     Socioeconomic History    Marital status: /Civil Union     Spouse name: None    Number of children: None    Years of education: 2 yr college    Highest education level: None   Occupational History    Occupation: supervisor    Tobacco Use    Smoking status: Every Day     Packs/day: 0.50     Years: 33.00     Total pack years: 16.50     Types: Cigarettes    Smokeless tobacco: Never   Vaping Use    Vaping Use: Some days    Substances: Nicotine   Substance and Sexual Activity    Alcohol use: Not Currently    Drug use: Never    Sexual activity: Not Currently     Partners: Male   Other Topics Concern    None   Social History Narrative    Most recent tobacco use screenin2019    Do you currently or have you served in the [address] [place] [address]: No    Were you activated, into active duty, as a member of the [employer] or as a Reservist: No    Occupation: supervisor    Education: 2  [facility] stress level: Medium    Exercise level: None    Diet: Regular    Marital status:     Sexual orientation: Heterosexual    Alcohol intake: None    Live alone or with others: with others    Caffeine intake: Moderate    coffee daily    Illicit drugs: Denies    Sexually active: Yes    Seat belts used routinely: Yes    Sunscreen used routinely: No     Social Determinants of Health     Financial Resource Strain: Low Risk  (3/25/2022)    Overall Financial Resource Strain (CARDIA)     Difficulty of Paying Living Expenses: Not hard at all   Food Insecurity: No Food Insecurity (3/25/2022)    Hunger Vital Sign     Worried About Running Out of Food in the Last Year: Never true     Ran Out of Food in the Last Year: Never true   Transportation Needs: No Transportation Needs (3/25/2022)    PRAPARE - Transportation     Lack of Transportation (Medical): No     Lack of Transportation (Non-Medical):  No   Physical Activity: Sufficiently Active (4/13/2021)    Exercise Vital Sign     Days of Exercise per Week: 4 days     Minutes of Exercise per Session: 150+ min   Stress: Not on file   Social Connections: Not on file   Intimate Partner Violence: Not on file   Housing Stability: Not on file      Family History:     Family History   Problem Relation Age of Onset    Hypertension Mother     Aneurysm Mother     No Known Problems Father     No Known Problems Sister     Stroke Brother     No Known Problems Brother     No Known Problems Maternal Grandmother     No Known Problems Maternal Grandfather     No Known Problems Paternal Grandmother     No Known Problems Paternal Grandfather     No Known Problems Daughter     No Known Problems Son     No Known Problems Son     No Known Problems Son     No Known Problems Maternal Aunt       Current Medications:     Current Outpatient Medications   Medication Sig Dispense Refill    albuterol (Ventolin HFA) 90 mcg/act inhaler Inhale 2 puffs every 6 (six) hours as needed for wheezing 18 g 5    buPROPion (WELLBUTRIN XL) 150 mg 24 hr tablet Take 1 tablet (150 mg total) by mouth every morning 30 tablet 5     No current facility-administered medications for this visit. Allergies:     No Known Allergies   Physical Exam:     /73 (BP Location: Left arm, Patient Position: Sitting, Cuff Size: Standard)   Pulse 80   Temp 97.6 °F (36.4 °C) (Temporal)   Wt 44.5 kg (98 lb)   LMP  (LMP Unknown)   BMI 18.52 kg/m²     Physical Exam:  General: No apparent distress, resting comfortably   Head: Normocephalic, atraumatic  Eyes: Anicteric, no conjunctival erythema  ENT: External ear normal, no nasal discharge  Neck: Trachea midline, no visible lymphadenopathy or goiter  Respiratory: Clear to auscultation. Non-labored respirations, symmetric thorax expansion  Cardiovascular: Regular rate and rhythm. Extremities appear well-perfused  Abdomen: Non-distended  MSK: Pain on active range of motion in left shoulder flexion and abduction. Positive Neer and empty can test.  Skin: Small, subcentimeter skin lesion on right cheek with surrounding erythema and scaling.    Foot exam: Approximately 2 cm subcutaneous lesion on the plantar aspect of the right foot consistent with plantar fibroma  Neuro: A&O x 3, no gross focal deficits, no aphasia    Sanjiv Gallardo, DO  [address] [facility], [place], operated [facility]

## 2023-11-17 ENCOUNTER — APPOINTMENT (OUTPATIENT)
Dept: LAB | Facility: CLINIC | Age: 53
End: 2023-11-17
Payer: COMMERCIAL

## 2023-11-17 DIAGNOSIS — Z00.00 ANNUAL PHYSICAL EXAM: ICD-10-CM

## 2023-11-17 DIAGNOSIS — Z11.59 NEED FOR HEPATITIS C SCREENING TEST: ICD-10-CM

## 2023-11-17 DIAGNOSIS — Z13.1 SCREENING FOR DIABETES MELLITUS: ICD-10-CM

## 2023-11-17 LAB
25(OH)D3 SERPL-MCNC: 28.7 NG/ML (ref 30–100)
CHOLEST SERPL-MCNC: 251 MG/DL
HDLC SERPL-MCNC: 50 MG/DL
LDLC SERPL CALC-MCNC: 181 MG/DL (ref 0–100)
NONHDLC SERPL-MCNC: 201 MG/DL
TRIGL SERPL-MCNC: 101 MG/DL

## 2023-11-17 PROCEDURE — 36415 COLL VENOUS BLD VENIPUNCTURE: CPT

## 2023-11-17 PROCEDURE — 86803 HEPATITIS C AB TEST: CPT

## 2023-11-17 PROCEDURE — 83036 HEMOGLOBIN GLYCOSYLATED A1C: CPT

## 2023-11-17 PROCEDURE — 82306 VITAMIN D 25 HYDROXY: CPT

## 2023-11-17 PROCEDURE — 80061 LIPID PANEL: CPT

## 2023-11-18 LAB
EST. AVERAGE GLUCOSE BLD GHB EST-MCNC: 120 MG/DL
HBA1C MFR BLD: 5.8 %
HCV AB S/CO SERPL IA: NON REACTIVE
SL AMB INTERPRETATION: NORMAL

## 2023-11-27 ENCOUNTER — TELEPHONE (OUTPATIENT)
Age: 53
End: 2023-11-27

## 2023-11-27 NOTE — TELEPHONE ENCOUNTER
Caller: Adrianna Muniz    Doctor: Marilu/Bhaskar    Reason for call: Patient had a appt with Dr Irais Dorman today she stated when she went in there they where going to rs her to Thursday at 3:15 with Dr Aki Sanderson. I am unable to double book appts.   Thank  you    Call back#: [phone]

## 2023-11-28 ENCOUNTER — TELEPHONE (OUTPATIENT)
Dept: OBGYN CLINIC | Facility: CLINIC | Age: 53
End: 2023-11-28

## 2023-11-28 DIAGNOSIS — N95.0 PMB (POSTMENOPAUSAL BLEEDING): Primary | ICD-10-CM

## 2023-11-28 NOTE — TELEPHONE ENCOUNTER
Patient hasn't had period in 2 years and now she has been bleeding vaginally since yesterday.  She is at work so if she doesn't answer it is ok to leave vm

## 2023-11-30 ENCOUNTER — OFFICE VISIT (OUTPATIENT)
Dept: PODIATRY | Facility: CLINIC | Age: 53
End: 2023-11-30
Payer: COMMERCIAL

## 2023-11-30 VITALS
SYSTOLIC BLOOD PRESSURE: 88 MMHG | HEART RATE: 91 BPM | BODY MASS INDEX: 18.12 KG/M2 | DIASTOLIC BLOOD PRESSURE: 60 MMHG | WEIGHT: 96 LBS | HEIGHT: 61 IN

## 2023-11-30 DIAGNOSIS — M72.2 PLANTAR FIBROMATOSIS: Primary | ICD-10-CM

## 2023-11-30 DIAGNOSIS — M79.671 RIGHT FOOT PAIN: ICD-10-CM

## 2023-11-30 PROCEDURE — 20552 NJX 1/MLT TRIGGER POINT 1/2: CPT | Performed by: PODIATRIST

## 2023-11-30 PROCEDURE — 99213 OFFICE O/P EST LOW 20 MIN: CPT | Performed by: PODIATRIST

## 2023-11-30 RX ORDER — TRIAMCINOLONE ACETONIDE 40 MG/ML
20 INJECTION, SUSPENSION INTRA-ARTICULAR; INTRAMUSCULAR
Status: SHIPPED | OUTPATIENT
Start: 2023-11-30

## 2023-11-30 RX ORDER — LIDOCAINE HYDROCHLORIDE 10 MG/ML
2 INJECTION, SOLUTION INFILTRATION; PERINEURAL
Status: SHIPPED | OUTPATIENT
Start: 2023-11-30

## 2023-11-30 RX ADMIN — TRIAMCINOLONE ACETONIDE 20 MG: 40 INJECTION, SUSPENSION INTRA-ARTICULAR; INTRAMUSCULAR at 15:15

## 2023-11-30 RX ADMIN — LIDOCAINE HYDROCHLORIDE 2 ML: 10 INJECTION, SOLUTION INFILTRATION; PERINEURAL at 15:15

## 2023-11-30 NOTE — PROGRESS NOTES
PATIENT:  Brennon Roe  1970       ASSESSMENT:     1. Plantar fibromatosis  Ambulatory Referral to Podiatry    Trigger Injection 1 or 2 muscles      2. Right foot pain                PLAN:  1. Reviewed medical records. Reviewed images. Patient was counseled and educated on the condition and the diagnosis. 2. The exam and symptoms are consistent with plantar fibroma. The diagnosis, treatment options and prognosis were discussed with the patient. 3. Treatment options were discussed and the patient wished to proceed with an intralesional injection. See procedure. 4. Instructed supportive care, home exercise/ stretching, icing, and proper footwear/ arch support. Discussed possible surgical treatment if conservative care fails. 5. She will return in 5 weeks. Universal Protocol:  Consent: Verbal consent obtained. Risks and benefits: risks, benefits and alternatives were discussed  Consent given by: patient  Time out: Immediately prior to procedure a "time out" was called to verify the correct patient, procedure, equipment, support staff and site/side marked as required.   Timeout called at: 11/30/2023 4:41 PM.  Patient understanding: patient states understanding of the procedure being performed  Site marked: the operative site was marked  Patient identity confirmed: verbally with patient  Supporting Documentation  Indications: pain   Trigger Point Injections: single/multiple trigger point(s): 1-2 muscle groups    Injection site identified by: palpation  Procedure Details  Location(s):    Prep: patient was prepped and draped in usual sterile fashion  Needle size: 25 G  Medications: 2 mL lidocaine 1 %; 20 mg triamcinolone acetonide 40 mg/mL  Patient tolerance: patient tolerated the procedure well with no immediate complications  Additional procedure details: Plantar fibroma right arch                Subjective:       HPI  The patient presents with chief complaint of lump on right foot.  Her lesion does not seem to be better. Pain presents when she walks. No new injury. No swelling. No dysfunction or numbness in her feet. The following portions of the patient's history were reviewed and updated as appropriate: allergies, current medications, past family history, past medical history, past social history, past surgical history and problem list.  All pertinent labs and images were reviewed. Past Medical History  Past Medical History:   Diagnosis Date    Abnormal Pap smear of cervix     HPV and PAP smear 2020 normal     Anxiety     Anxiety and depression     no longer on meds    Breast lump     hx of right breast lump in lumpectomy in     Depression     Migraine     Nicotine dependence     currently smoker, 33 pack year hx       Past Surgical History  Past Surgical History:   Procedure Laterality Date    BREAST EXCISIONAL BIOPSY Right 2014    excisional- done in [facility] Right     benign     SECTION      x's 4    COLONOSCOPY  2020    DILATION AND CURETTAGE, DIAGNOSTIC / THERAPEUTIC      pre cancerous cells? MAMMO (HISTORICAL)  2018    MULTIPLE TOOTH EXTRACTIONS      X2    TUBAL LIGATION      US GUIDED BREAST BIOPSY LEFT COMPLETE Left 12/15/2022        Allergies:  Patient has no known allergies. Medications:  Current Outpatient Medications   Medication Sig Dispense Refill    albuterol (Ventolin HFA) 90 mcg/act inhaler Inhale 2 puffs every 6 (six) hours as needed for wheezing 18 g 5    buPROPion (WELLBUTRIN XL) 150 mg 24 hr tablet Take 1 tablet (150 mg total) by mouth every morning 30 tablet 5     No current facility-administered medications for this visit.        Social History:  Social History     Socioeconomic History    Marital status: /Civil Union     Spouse name: None    Number of children: None    Years of education: 2 yr college    Highest education level: None   Occupational History    Occupation: supervisor Tobacco Use    Smoking status: Every Day     Packs/day: 0.50     Years: 33.00     Total pack years: 16.50     Types: Cigarettes    Smokeless tobacco: Never   Vaping Use    Vaping Use: Some days    Substances: Nicotine   Substance and Sexual Activity    Alcohol use: Not Currently    Drug use: Never    Sexual activity: Not Currently     Partners: Male   Other Topics Concern    None   Social History Narrative    Most recent tobacco use screenin2019    Do you currently or have you served in the [address] [place] [address]: No    Were you activated, into active duty, as a member of the [employer] or as a Reservist: No    Occupation: supervisor    Education: 1959 [employer] stress level: Medium    Exercise level: None    Diet: Regular    Marital status:     Sexual orientation: Heterosexual    Alcohol intake: None    Live alone or with others: with others    Caffeine intake: Moderate    coffee daily    Illicit drugs: Denies    Sexually active: Yes    Seat belts used routinely: Yes    Sunscreen used routinely: No     Social Determinants of Health     Financial Resource Strain: Low Risk  (3/25/2022)    Overall Financial Resource Strain (CARDIA)     Difficulty of Paying Living Expenses: Not hard at all   Food Insecurity: No Food Insecurity (3/25/2022)    Hunger Vital Sign     Worried About Running Out of Food in the Last Year: Never true     Ran Out of Food in the Last Year: Never true   Transportation Needs: No Transportation Needs (3/25/2022)    PRAPARE - Transportation     Lack of Transportation (Medical): No     Lack of Transportation (Non-Medical):  No   Physical Activity: Sufficiently Active (2021)    Exercise Vital Sign     Days of Exercise per Week: 4 days     Minutes of Exercise per Session: 150+ min   Stress: Not on file   Social Connections: Not on file   Intimate Partner Violence: Not on file   Housing Stability: Not on file          Review of Systems   Constitutional:  Negative for appetite change, chills and fever. Respiratory:  Negative for cough and shortness of breath. Cardiovascular:  Negative for chest pain and leg swelling. Gastrointestinal:  Negative for diarrhea, nausea and vomiting. Musculoskeletal:  Negative for gait problem. Neurological:  Negative for weakness and numbness. Hematological: Negative. Psychiatric/Behavioral:  Negative for confusion. Objective:      BP (!) 88/60   Pulse 91   Ht 5' 1" (1.549 m)   Wt 43.5 kg (96 lb)   LMP  (LMP Unknown)   BMI 18.14 kg/m²          Physical Exam  Vitals reviewed. Constitutional:       General: She is not in acute distress. Appearance: She is not toxic-appearing or diaphoretic. Cardiovascular:      Rate and Rhythm: Normal rate and regular rhythm. Pulses: Normal pulses. Dorsalis pedis pulses are 2+ on the right side and 2+ on the left side. Posterior tibial pulses are 2+ on the right side and 2+ on the left side. Pulmonary:      Effort: Pulmonary effort is normal. No respiratory distress. Musculoskeletal:         General: No swelling or signs of injury. Right lower leg: No edema. Left lower leg: No edema. Right foot: No foot drop. Left foot: No foot drop. Comments: Firm mass on plantar fascia of right arch. It is about 2.0 X 1.0 cm. Skin:     General: Skin is warm and dry. Capillary Refill: Capillary refill takes less than 2 seconds. Coloration: Skin is not cyanotic or mottled. Findings: No abscess, ecchymosis, erythema, rash or wound. Nails: There is no clubbing. Neurological:      General: No focal deficit present. Mental Status: She is alert and oriented to person, place, and time. Cranial Nerves: No cranial nerve deficit. Sensory: No sensory deficit. Motor: No weakness.       Coordination: Coordination normal.   Psychiatric:         Mood and Affect: Mood normal.         Behavior: Behavior normal. Thought Content:  Thought content normal.         Judgment: Judgment normal.

## 2023-12-04 DIAGNOSIS — E55.9 VITAMIN D DEFICIENCY: Primary | ICD-10-CM

## 2023-12-04 RX ORDER — OMEGA-3S/DHA/EPA/FISH OIL/D3 300MG-1000
800 CAPSULE ORAL DAILY
Qty: 60 TABLET | Refills: 2 | Status: SHIPPED | OUTPATIENT
Start: 2023-12-04 | End: 2024-03-03

## 2023-12-06 NOTE — TELEPHONE ENCOUNTER
Patient aware of US ordered- will call to get it scheduled once she has it scheduled she will call us to make a f/u to review results about 2 weeks after [place] appointment .

## 2023-12-06 NOTE — TELEPHONE ENCOUNTER
Will you place an order for US but schedule for f/u appt to review and discuss possible EMB.      Thanks,  Romaine Muro

## 2023-12-06 NOTE — TELEPHONE ENCOUNTER
No periods in  2 years     last week started bleeding  on 11/27 had a 5 day cycle. And is currently still spotting brown discharge.

## 2023-12-06 NOTE — TELEPHONE ENCOUNTER
Lm to call back when she can talk to us, we left several messages back and forth, we need to ask some questions, how long has she been bleeding? Is this after intercourse? How much is she bleeding?  Will most likely need an appointment, but would prefer to triage the call

## 2023-12-08 ENCOUNTER — OFFICE VISIT (OUTPATIENT)
Dept: MULTI SPECIALTY CLINIC | Facility: CLINIC | Age: 53
End: 2023-12-08

## 2023-12-08 DIAGNOSIS — D48.5 NEOPLASM OF UNCERTAIN BEHAVIOR OF SKIN: Primary | ICD-10-CM

## 2023-12-08 NOTE — PROGRESS NOTES
[facility] Note     Patient Name: Gerry Esqueda  Encounter Date: 12/8/23     Have you been cared for by a [facility]ologist in the last 3 years and, if so, which description applies to you? NO. I am considered a "new" patient and must complete all patient intake questions. I am FEMALE/of child-bearing potential.    REVIEW OF SYSTEMS:  Have you recently had or currently have any of the following? Recent fever or chills? No  Any non-healing wound? No  Are you pregnant or planning to become pregnant? No  Are you currently or planning to be nursing or breast feeding? No   PAST MEDICAL HISTORY:  Have you personally ever had or currently have any of the following? If "YES," then please provide more detail. Skin cancer (such as Melanoma, Basal Cell Carcinoma, Squamous Cell Carcinoma? No  Tuberculosis, HIV/AIDS, Hepatitis B or C: No  Radiation Treatment No   HISTORY OF IMMUNOSUPPRESSION:   Do you have a history of any of the following:  Systemic Immunosuppression such as Diabetes, Biologic or Immunotherapy, Chemotherapy, Organ Transplantation, Bone Marrow Transplantation? No    Answering "YES" requires the addition of the dotphrase "IMMUNOSUPPRESSED" as the first diagnosis of the patient's visit. FAMILY HISTORY:  Any "first degree relatives" (parent, brother, sister, or child) with the following? Skin Cancer, Pancreatic or Other Cancer? No   PATIENT EXPERIENCE:    Do you want the Dermatologist to perform a COMPLETE skin exam today including a clinical examination under the "bra and underwear" areas? Yes  If necessary, do we have your permission to call and leave a detailed message on your Preferred Phone number that includes your specific medical information?   Yes      No Known Allergies   Current Outpatient Medications:     albuterol (Ventolin HFA) 90 mcg/act inhaler, Inhale 2 puffs every 6 (six) hours as needed for wheezing, Disp: 18 g, Rfl: 5    buPROPion (WELLBUTRIN XL) 150 mg 24 hr tablet, Take 1 tablet (150 mg total) by mouth every morning, Disp: 30 tablet, Rfl: 5    cholecalciferol (VITAMIN D3) 400 units tablet, Take 2 tablets (800 Units total) by mouth daily, Disp: 60 tablet, Rfl: 2    Current Facility-Administered Medications:     lidocaine (XYLOCAINE) 1 % injection 2 mL, 2 mL, Injection, , Ahsley Petty, DPM, 2 mL at 11/30/23 1515    triamcinolone acetonide (KENALOG-40) 40 mg/mL injection 20 mg, 20 mg, Intra-articular, , Brandona Malinda, DPM, 20 mg at 11/30/23 1515          Whom besides the patient is providing clinical information about today's encounter? NO ADDITIONAL HISTORIAN (patient alone provided history)    Physical Exam and Assessment/Plan by Diagnosis:      LENTIGO    Physical Exam:  Anatomic Location Affected:  bilateral extremities  Morphological Description:  brown macules  Pertinent Positives:  Pertinent Negatives: Additional History of Present Condition:  skin exam    Assessment and Plan:  Based on a thorough discussion of this condition and the management approach to it (including a comprehensive discussion of the known risks, side effects and potential benefits of treatment), the patient (family) agrees to implement the following specific plan:  Reassured benign  Continue to monitor  Wear broad spectrum 30 SPF sunscreen daily and sun protective clothing      CHAMORRO ANGIOMA    Physical Exam:  Anatomic Location Affected:  bilateral arms, chest  Morphological Description:  red pinpoint papules  Pertinent Positives:  Pertinent Negatives:     Additional History of Present Condition:  skin exam    Assessment and Plan:  Based on a thorough discussion of this condition and the management approach to it (including a comprehensive discussion of the known risks, side effects and potential benefits of treatment), the patient (family) agrees to implement the following specific plan:  Reassured benign      NEOPLASM OF UNCERTAIN BEHAVIOR    Physical Exam:  (Anatomic Location); (Size and Morphological Description); (Differential Diagnosis):  Right cheek, 1.7 cm x 1.0 cm pink plaque with erosion, ddx: BCC    Pertinent Positives:  Pertinent Negatives: Additional History of Present Condition:  Present for about 1 year. Patient states the area will occasionally bleed when picked. She denies any pain or pruritus. Patient denies any family history of skin malignancy. Plan:  Biopsy today, will call with results       PROCEDURES PERFORMED TODAY ASSOCIATED WITH THIS CONDITION:          TANGENTIAL BIOPSY  PROCEDURE TANGENTIAL (SHAVE) BIOPSY NOTE:    Performing Physician:  and Aroldo Baires NP  Anatomic Location; Clinical Description with size (cm); Pre-Op Diagnosis:   Right cheek, 1.7 cm x 1.0 cm pink plaque with erosion, ddx: BCC  Post-op diagnosis: Same     Local anesthesia: 1% xylocaine with epi      Topical anesthesia: None    Hemostasis: Aluminum chloride       After obtaining informed consent  at which time there was a discussion about the purpose of biopsy  and low risks of infection and bleeding. The area was prepped and draped in the usual fashion. Anesthesia was obtained with 1% lidocaine with epinephrine. A shave biopsy to an appropriate sampling depth was obtained by Shave (Dermablade or 15 blade) The resulting wound was covered with surgical ointment and bandaged appropriately. The patient tolerated the procedure well without complications and was without signs of functional compromise. Specimen has been sent for review by Dermatopathology. Standard post-procedure care has been explained and has been included in written form within the patient's copy of Informed Consent. INFORMED CONSENT DISCUSSION AND POST-OPERATIVE INSTRUCTIONS FOR PATIENT    I.  RATIONALE FOR PROCEDURE  I understand that a skin biopsy allows the Dermatologist to test a lesion or rash under the microscope to obtain a diagnosis.   It usually involves numbing the area with numbing medication and removing a small piece of skin; sometimes the area will be closed with sutures. In this specific procedure, sutures are not usually needed. If any sutures are placed, then they are usually need to be removed in 2 weeks or less. I understand that my Dermatologist recommends that a skin "shave" biopsy be performed today. A local anesthetic, similar to the kind that a dentist uses when filling a cavity, will be injected with a very small needle into the skin area to be sampled. The injected skin and tissue underneath "will go to sleep” and become numb so no pain should be felt afterwards. An instrument shaped like a tiny "razor blade" (shave biopsy instrument) will be used to cut a small piece of tissue and skin from the area so that a sample of tissue can be taken and examined more closely under the microscope. A slight amount of bleeding will occur, but it will be stopped with direct pressure and a pressure bandage and any other appropriate methods. I understands that a scar will form where the wound was created. Surgical ointment will be applied to help protect the wound. Sutures are not usually needed. II.  RISKS AND POTENTIAL COMPLICATIONS   I understand the risks and potential complications of a skin biopsy include but are not limited to the following:  Bleeding  Infection  Pain  Scar/keloid  Skin discoloration  Incomplete Removal  Recurrence  Nerve Damage/Numbness/Loss of Function  Allergic Reaction to Anesthesia  Biopsies are diagnostic procedures and based on findings additional treatment or evaluation may be required  Loss or destruction of specimen resulting in no additional findings    My Dermatologist has explained to me the nature of the condition, the nature of the procedure, and the benefits to be reasonably expected compared with alternative approaches.   My Dermatologist has discussed the likelihood of major risks or complications of this procedure including the specific risks listed above, such as bleeding, infection, and scarring/keloid. I understand that a scar is expected after this procedure. I understand that my physician cannot predict if the scar will form a "keloid," which extends beyond the borders of the wound that is created. A keloid is a thick, painful, and bumpy scar. A keloid can be difficult to treat, as it does not always respond well to therapy, which includes injecting cortisone directly into the keloid every few weeks. While this usually reduces the pain and size of the scar, it does not eliminate it. I understand that photographs may be taken before and after the procedure. These will be maintained as part of the medical providers confidential records and may not be made available to me. I further authorize the medical provider to use the photographs for teaching purposes or to illustrate scientific papers, books, or lectures if in his/her judgment, medical research, education, or science may benefit from its use. I have had an opportunity to fully inquire about the risks and benefits of this procedure and its alternatives. I have been given ample time and opportunity to ask questions and to seek a second opinion if I wished to do so. I acknowledge that there have specifically been no guarantees as to the cosmetic results from the procedure. I am aware that with any procedure there is always the possibility of an unexpected complication. III. POST-PROCEDURAL CARE (WHAT YOU WILL NEED TO DO "AFTER THE BIOPSY" TO OPTIMIZE HEALING)    Keep the area clean and dry. Try NOT to remove the bandage or get it wet for the first 24 hours. Gently clean the area and apply surgical ointment (such as Vaseline petrolatum ointment, which is available "over the counter" and not a prescription) to the biopsy site for up to 2 weeks straight. This acts to protect the wound from the outside world. Sutures are not usually placed in this procedure.   If any sutures were placed, return for suture removal as instructed (generally 1 week for the face, 2 weeks for the body). Take Acetaminophen (Tylenol) for discomfort, if no contraindications. Ibuprofen or aspirin could make bleeding worse. Call our office immediately for signs of infection: fever, chills, increased redness, warmth, tenderness, discomfort/pain, or pus or foul smell coming from the wound. WHAT TO DO IF THERE IS ANY BLEEDING? If a small amount of bleeding is noticed, place a clean cloth over the area and apply firm pressure for ten minutes. Check the wound after 10 minutes of direct pressure. If bleeding persists, try one more time for an additional 10 minutes of direct pressure on the area. If the bleeding becomes heavier or does not stop after the second attempt, or if you have any other questions about this procedure, then please call your [facility] by calling [phone] (SKIN). I hereby acknowledge that I have reviewed and verified the site with my Dermatologist and have requested and authorized my Dermatologist to proceed with the procedure. Medical Complexity:    UNDIAGNOSED NEW PROBLEM WITH UNCERTAIN DIAGNOSIS. A condition included in the differential diagnosis represents a high risk of morbidity without treatment. Scribe Attestation      I,:  Mervat Walters am acting as a scribe while in the presence of the attending physician.:       I,:  Julio Rhoades MD personally performed the services described in this documentation    as scribed in my presence.:           Marixa Ash, [address] [facility]. Assessment and evaluation performed with Dr. Edyta Johnson.

## 2023-12-14 ENCOUNTER — TELEPHONE (OUTPATIENT)
Dept: DERMATOLOGY | Facility: CLINIC | Age: 53
End: 2023-12-14

## 2023-12-14 DIAGNOSIS — C44.319 BASAL CELL CARCINOMA (BCC) OF SKIN OF OTHER PART OF FACE: Primary | ICD-10-CM

## 2023-12-14 NOTE — RESULT ENCOUNTER NOTE
DERMATOPATHOLOGY RESULT NOTE    Results reviewed by ordering physician. Called patient to personally discuss results. Discussed pathology results with patient. Patient meets MOHs criteria. This was previously discussed by Dr. Delvin Mcgregor at office visit. Order for referral placed as ASAP given focal morpheaform features. Informed consent provided, all questions answered. Patient states biopsy site is healing well. Office will be in contact with patient to schedule. Instructions for Clinical Derm Team:   (remember to route Result Note to appropriate staff):    Call patient and schedule for [facility]    Result & Plan by Specimen:    Specimen A: malignant  Plan: [facility]    Tissue Exam: [id number]  Order: [id number]  Status: Final result      Visible to patient: Yes (not seen)      Dx: Neoplasm of uncertain behavior of skin    0 Result Notes     Component   Case Report  Surgical Pathology Report                         Case: [id number]                                  Authorizing Provider:  Raffaele Becerra,     Collected:           12/08/2023 1132                                     CRLOIS                                                                        Ordering Location:     [address] [place] [address]      Received:            12/08/2023 [facility]                                                          Pathologist:           Susan Garcia MD                                                                          Specimen:    Skin, Other, A: right cheek                                                              Final Diagnosis  This case was sent for processing and evaluation to [address] [place] [address]. The final diagnosis is issued below; their complete report is viewable in the attached link.     A. Right cheek, shave biopsy:  - Basal Cell Carcinoma, Nodular, with Focal Morpheaform Features.   Electronically signed by Shanna Diana MD on 12/14/2023 at 10:22 AM

## 2023-12-15 ENCOUNTER — TELEPHONE (OUTPATIENT)
Age: 53
End: 2023-12-15

## 2023-12-15 NOTE — TELEPHONE ENCOUNTER
Pt called to schedule mohs procedure    Pre- operative Mohs Telephone Scheduling Note    Do you have a pacemaker, defibrillator, spinal or brain stimulator? no    Do you take antibiotics before skin or dental procedures? no  If yes, will likely require pre-operative antibiotics. Ask  the patient why they take the antibiotics (usually because of joint replacement). Do you have a history of a joint replacements within the past 2 years? no   If yes, will likely require pre-operative antibiotics. Ask if orthopaedic surgeon has prescribed pre-operative antibiotics to take before procedures/dental work? Do you take any OTC medications that thin your blood (Aspirin, Aleve, Ibuprofen) or supplements that thin your blood (fish oil, garlic, vitamin E, Ginko Biloba)? no    Do you take any prescribed medications that thin your blood (Coumadin, Plavix, Xarelto, Eliquis or another prescribed blood thinner)? no    Do you have an allergy to lidocaine or epinephrine? no    Do you have allergies to Iodine? no    Do you wear a lidocaine patch? no    Have you ever been diagnosed with HIV, AIDS, Hep B and Hep C? no    Do you use a cane, walker or wheelchair? no    Is the patient from a nursing home? no If yes, Is there any special accommodations that is needed for patient n/a    Do you smoke? yes: 1/2 pack daily      If yes,  patient to try and stop 2 days before surgery and 7 days after the surgery. Minimizing smoking as much as possible during this time will improve healing and the cosmetic result after surgery. Do you use supplemental oxygen? If so, how many liters and can you be off it for a short period of time? N/a    Date scheduled: BCC right cheek 1/31/2024 10:00 AM    Coordination of Care with other provider (Oculoplastics, Plastics, ENT) required? no   IF YES, PLEASE FORWARD TO APPROPRIATE PERSONNEL TO HELP COORDINATE. Are there remaining tumors to be scheduled? no    Was Prior Authorization obtained?  No (please use [email or web address] to document prior auth)

## 2023-12-15 NOTE — TELEPHONE ENCOUNTER
Received call from patient stating that Joyce Moralez called her saying that some one from our office would be calling her to [facility] sx. Patient states that no one has call her yet.     Could you please advise

## 2023-12-21 ENCOUNTER — HOSPITAL ENCOUNTER (OUTPATIENT)
Dept: RADIOLOGY | Facility: HOSPITAL | Age: 53
Discharge: HOME | End: 2023-12-21
Payer: COMMERCIAL

## 2023-12-21 DIAGNOSIS — N95.0 PMB (POSTMENOPAUSAL BLEEDING): ICD-10-CM

## 2023-12-21 PROCEDURE — 76830 TRANSVAGINAL US NON-OB: CPT

## 2023-12-21 PROCEDURE — 76856 US EXAM PELVIC COMPLETE: CPT

## 2023-12-29 ENCOUNTER — TELEPHONE (OUTPATIENT)
Dept: DERMATOLOGY | Facility: CLINIC | Age: 53
End: 2023-12-29

## 2024-01-04 ENCOUNTER — OFFICE VISIT (OUTPATIENT)
Dept: PODIATRY | Facility: CLINIC | Age: 54
End: 2024-01-04
Payer: COMMERCIAL

## 2024-01-04 VITALS
SYSTOLIC BLOOD PRESSURE: 117 MMHG | DIASTOLIC BLOOD PRESSURE: 68 MMHG | HEART RATE: 86 BPM | BODY MASS INDEX: 17.37 KG/M2 | WEIGHT: 92 LBS | HEIGHT: 61 IN

## 2024-01-04 DIAGNOSIS — M72.2 PLANTAR FIBROMATOSIS: Primary | ICD-10-CM

## 2024-01-04 PROCEDURE — 99212 OFFICE O/P EST SF 10 MIN: CPT | Performed by: PODIATRIST

## 2024-01-04 NOTE — PROGRESS NOTES
PATIENT:  Yazmin Mooney  1970       ASSESSMENT:     1. Plantar fibromatosis                  PLAN:  1. Reviewed medical records.  Patient was counseled and educated on the condition and the diagnosis.    2. The diagnosis, treatment options and prognosis were discussed with the patient.    3. She is doing well at this time.  Instructed supportive care, home exercise/ stretching, icing, and proper footwear/ arch support.  She may return as needed.      Subjective:       HPI  The patient presents for follow-up on lump on right foot.  She feels much better.  Pain resolved.  Lump looks smaller.  No new complaint.        The following portions of the patient's history were reviewed and updated as appropriate: allergies, current medications, past family history, past medical history, past social history, past surgical history and problem list.  All pertinent labs and images were reviewed.      Past Medical History  Past Medical History:   Diagnosis Date   • Abnormal Pap smear of cervix     HPV and PAP smear 2020 normal    • Anxiety    • Anxiety and depression     no longer on meds   • Breast lump     hx of right breast lump in lumpectomy in    • Depression    • Migraine    • Nicotine dependence     currently smoker, 33 pack year hx       Past Surgical History  Past Surgical History:   Procedure Laterality Date   • BREAST EXCISIONAL BIOPSY Right     excisional- done in [place]   • BREAST LUMPECTOMY Right     benign   •  SECTION      x's 4   • COLONOSCOPY  2020   • DILATION AND CURETTAGE, DIAGNOSTIC / THERAPEUTIC      pre cancerous cells?   • MAMMO (HISTORICAL)  2018   • MULTIPLE TOOTH EXTRACTIONS      X2   • TUBAL LIGATION     • US GUIDED BREAST BIOPSY LEFT COMPLETE Left 12/15/2022        Allergies:  Patient has no known allergies.    Medications:  Current Outpatient Medications   Medication Sig Dispense Refill   • albuterol (Ventolin HFA) 90 mcg/act inhaler Inhale 2  puffs every 6 (six) hours as needed for wheezing 18 g 5   • buPROPion (WELLBUTRIN XL) 150 mg 24 hr tablet Take 1 tablet (150 mg total) by mouth every morning 30 tablet 5   • cholecalciferol (VITAMIN D3) 400 units tablet Take 2 tablets (800 Units total) by mouth daily 60 tablet 2     Current Facility-Administered Medications   Medication Dose Route Frequency Provider Last Rate Last Admin   • lidocaine (XYLOCAINE) 1 % injection 2 mL  2 mL Injection  Kaleb Muro DPM   2 mL at 23 1515   • triamcinolone acetonide (KENALOG-40) 40 mg/mL injection 20 mg  20 mg Intra-articular  Kaleb Muro DPM   20 mg at 23 1515       Social History:  Social History     Socioeconomic History   • Marital status: /Civil Union     Spouse name: Not on file   • Number of children: Not on file   • Years of education: 2 yr college   • Highest education level: Not on file   Occupational History   • Occupation: supervisor    Tobacco Use   • Smoking status: Every Day     Current packs/day: 0.50     Average packs/day: 0.5 packs/day for 33.0 years (16.5 ttl pk-yrs)     Types: Cigarettes   • Smokeless tobacco: Never   Vaping Use   • Vaping status: Some Days   • Substances: Nicotine   Substance and Sexual Activity   • Alcohol use: Not Currently   • Drug use: Never   • Sexual activity: Not Currently     Partners: Male   Other Topics Concern   • Not on file   Social History Narrative    Most recent tobacco use screenin2019    Do you currently or have you served in the [employer] Armed Forces: No    Were you activated, into active duty, as a member of the [employer] or as a Reservist: No    Occupation: supervisor    Education: 2 Year College    General stress level: Medium    Exercise level: None    Diet: Regular    Marital status:     Sexual orientation: Heterosexual    Alcohol intake: None    Live alone or with others: with others    Caffeine intake: Moderate    coffee daily    Illicit drugs: Denies    Sexually active: Yes     "Seat belts used routinely: Yes    Sunscreen used routinely: No     Social Determinants of Health     Financial Resource Strain: Low Risk  (3/25/2022)    Overall Financial Resource Strain (CARDIA)    • Difficulty of Paying Living Expenses: Not hard at all   Food Insecurity: No Food Insecurity (3/25/2022)    Hunger Vital Sign    • Worried About Running Out of Food in the Last Year: Never true    • Ran Out of Food in the Last Year: Never true   Transportation Needs: No Transportation Needs (3/25/2022)    PRAPARE - Transportation    • Lack of Transportation (Medical): No    • Lack of Transportation (Non-Medical): No   Physical Activity: Sufficiently Active (4/13/2021)    Exercise Vital Sign    • Days of Exercise per Week: 4 days    • Minutes of Exercise per Session: 150+ min   Stress: Not on file   Social Connections: Not on file   Intimate Partner Violence: Not on file   Housing Stability: Not on file          Review of Systems   Constitutional:  Negative for appetite change, chills and fever.   Respiratory:  Negative for cough and shortness of breath.    Cardiovascular:  Negative for chest pain and leg swelling.   Gastrointestinal:  Negative for diarrhea, nausea and vomiting.   Musculoskeletal:  Negative for gait problem.   Neurological:  Negative for weakness and numbness.   Hematological: Negative.    Psychiatric/Behavioral:  Negative for confusion.          Objective:      /68   Pulse 86   Ht 5' 1\" (1.549 m)   Wt 41.7 kg (92 lb)   LMP  (LMP Unknown)   BMI 17.38 kg/m²          Physical Exam  Vitals reviewed.   Constitutional:       General: She is not in acute distress.     Appearance: She is not toxic-appearing or diaphoretic.   Cardiovascular:      Rate and Rhythm: Normal rate and regular rhythm.      Pulses: Normal pulses.           Dorsalis pedis pulses are 2+ on the right side and 2+ on the left side.        Posterior tibial pulses are 2+ on the right side and 2+ on the left side.   Pulmonary:      " Effort: Pulmonary effort is normal. No respiratory distress.   Musculoskeletal:         General: No swelling or signs of injury.      Right lower leg: No edema.      Left lower leg: No edema.      Right foot: No foot drop.      Left foot: No foot drop.      Comments: Significant reduction of plantar fibroma on plantar fascia of right arch.  It is about 0.8 X 0.3 cm.  No pain on palpation.       Skin:     General: Skin is warm and dry.      Capillary Refill: Capillary refill takes less than 2 seconds.      Coloration: Skin is not cyanotic or mottled.      Findings: No abscess, ecchymosis, erythema, rash or wound.      Nails: There is no clubbing.   Neurological:      General: No focal deficit present.      Mental Status: She is alert and oriented to person, place, and time.      Cranial Nerves: No cranial nerve deficit.      Sensory: No sensory deficit.      Motor: No weakness.      Coordination: Coordination normal.   Psychiatric:         Mood and Affect: Mood normal.         Behavior: Behavior normal.         Thought Content: Thought content normal.         Judgment: Judgment normal.

## 2024-01-11 ENCOUNTER — OFFICE VISIT (OUTPATIENT)
Dept: OBGYN CLINIC | Facility: CLINIC | Age: 54
End: 2024-01-11
Payer: COMMERCIAL

## 2024-01-11 VITALS
WEIGHT: 95 LBS | BODY MASS INDEX: 17.95 KG/M2 | SYSTOLIC BLOOD PRESSURE: 90 MMHG | HEART RATE: 76 BPM | DIASTOLIC BLOOD PRESSURE: 60 MMHG

## 2024-01-11 DIAGNOSIS — N95.0 POST-MENOPAUSAL BLEEDING: Primary | ICD-10-CM

## 2024-01-11 PROCEDURE — 99213 OFFICE O/P EST LOW 20 MIN: CPT | Performed by: PHYSICIAN ASSISTANT

## 2024-01-11 NOTE — PROGRESS NOTES
Yazmin Mooney  1970      S:  53 y.o. female here for follow up of ultrasound.     Reports light menstrual like flow lasting 5 days about one month ago. Bleeding dark brown to red. Not associated with pelvic pain, vaginal dryness, or intercourse. Has now stopped w/o recurrence. No prior episode of  bleeding.  x 2 years, LMP 2022.       Current Outpatient Medications:     cholecalciferol (VITAMIN D3) 400 units tablet, Take 2 tablets (800 Units total) by mouth daily, Disp: 60 tablet, Rfl: 2    albuterol (Ventolin HFA) 90 mcg/act inhaler, Inhale 2 puffs every 6 (six) hours as needed for wheezing (Patient not taking: Reported on 2024), Disp: 18 g, Rfl: 5    buPROPion (WELLBUTRIN XL) 150 mg 24 hr tablet, Take 1 tablet (150 mg total) by mouth every morning (Patient not taking: Reported on 2024), Disp: 30 tablet, Rfl: 5    Current Facility-Administered Medications:     lidocaine (XYLOCAINE) 1 % injection 2 mL, 2 mL, Injection, , Kaleb Muro, NABEEL, 2 mL at 23 1515    triamcinolone acetonide (KENALOG-40) 40 mg/mL injection 20 mg, 20 mg, Intra-articular, , Kaleb Muro DPM, 20 mg at 23 1515  Social History     Socioeconomic History    Marital status: /Civil Union     Spouse name: Not on file    Number of children: Not on file    Years of education: 2 yr college    Highest education level: Not on file   Occupational History    Occupation: supervisor    Tobacco Use    Smoking status: Every Day     Current packs/day: 0.50     Average packs/day: 0.5 packs/day for 33.0 years (16.5 ttl pk-yrs)     Types: Cigarettes    Smokeless tobacco: Never   Vaping Use    Vaping status: Some Days    Substances: Nicotine   Substance and Sexual Activity    Alcohol use: Not Currently    Drug use: Never    Sexual activity: Not Currently     Partners: Male     Birth control/protection: Post-menopausal   Other Topics Concern    Not on file   Social History Narrative    Most recent tobacco use screenin2019     Do you currently or have you served in the [employer]: No    Were you activated, into active duty, as a member of the [employer] or as a Reservist: No    Occupation: supervisor    Education: 2 Year College    General stress level: Medium    Exercise level: None    Diet: Regular    Marital status:     Sexual orientation: Heterosexual    Alcohol intake: None    Live alone or with others: with others    Caffeine intake: Moderate    coffee daily    Illicit drugs: Denies    Sexually active: Yes    Seat belts used routinely: Yes    Sunscreen used routinely: No     Social Determinants of Health     Financial Resource Strain: Low Risk  (3/25/2022)    Overall Financial Resource Strain (CARDIA)     Difficulty of Paying Living Expenses: Not hard at all   Food Insecurity: No Food Insecurity (3/25/2022)    Hunger Vital Sign     Worried About Running Out of Food in the Last Year: Never true     Ran Out of Food in the Last Year: Never true   Transportation Needs: No Transportation Needs (3/25/2022)    PRAPARE - Transportation     Lack of Transportation (Medical): No     Lack of Transportation (Non-Medical): No   Physical Activity: Sufficiently Active (4/13/2021)    Exercise Vital Sign     Days of Exercise per Week: 4 days     Minutes of Exercise per Session: 150+ min   Stress: Not on file   Social Connections: Not on file   Intimate Partner Violence: Not on file   Housing Stability: Not on file     Family History   Problem Relation Age of Onset    Hypertension Mother     Aneurysm Mother     No Known Problems Father     No Known Problems Sister     Stroke Brother     No Known Problems Brother     No Known Problems Maternal Grandmother     No Known Problems Maternal Grandfather     No Known Problems Paternal Grandmother     No Known Problems Paternal Grandfather     No Known Problems Daughter     No Known Problems Son     No Known Problems Son     No Known Problems Son     No Known Problems Maternal Aunt      Past  Medical History:   Diagnosis Date    Abnormal Pap smear of cervix     HPV and PAP smear 07/16/2020 normal     Anxiety     Anxiety and depression     no longer on meds    Breast lump     hx of right breast lump in lumpectomy in 2014    Depression     Migraine     Nicotine dependence     currently smoker, 33 pack year hx       ROS:  Constitutional: Negative.  Genitourinary:  + vaginal bleeding. Negative for difficulty urinating, frequency, urgency, pelvic pain, vaginal discharge, odor or itching.      O:  Blood pressure 90/60, pulse 76, weight 43.1 kg (95 lb), not currently breastfeeding.    She appears well and is in no distress  Normocephalic, atraumatic.   Abdomen is soft and nontender   declined - normal exam at annual 10/2023 with episode occurring one month later   No focal neurological deficits.   Normal mood, affect, and behavior.     A/P:  Diagnoses and all orders for this visit:    Post-menopausal bleeding      Discussed etiology, evaluation, management of postmenopausal bleeding. Most common etiology atrophy, also possibly structural ie polyps, however always possible that hyperplasia or malignancy is the cause.   Ultrasound demonstrates thin endometrial lining (2 mm) and with no structural abn to account for bleeding.  Reassurance provided that bleeding is likely secondary to atrophy.  Requested she call /RTO sooner than annual exam scheduled for 10/2024 should bleeding recur as further workup would be necessary.  Verbalizes understanding and agreement with this plan.

## 2024-01-31 ENCOUNTER — PROCEDURE VISIT (OUTPATIENT)
Dept: DERMATOLOGY | Facility: CLINIC | Age: 54
End: 2024-01-31
Payer: COMMERCIAL

## 2024-01-31 VITALS
HEIGHT: 61 IN | SYSTOLIC BLOOD PRESSURE: 109 MMHG | TEMPERATURE: 97.3 F | BODY MASS INDEX: 17.82 KG/M2 | WEIGHT: 94.4 LBS | OXYGEN SATURATION: 100 % | DIASTOLIC BLOOD PRESSURE: 55 MMHG | HEART RATE: 70 BPM

## 2024-01-31 DIAGNOSIS — C44.319 BASAL CELL CARCINOMA (BCC) OF SKIN OF OTHER PART OF FACE: ICD-10-CM

## 2024-01-31 PROCEDURE — 17312 MOHS ADDL STAGE: CPT | Performed by: DERMATOLOGY

## 2024-01-31 PROCEDURE — 17311 MOHS 1 STAGE H/N/HF/G: CPT | Performed by: DERMATOLOGY

## 2024-01-31 PROCEDURE — 14041 TIS TRNFR F/C/C/M/N/A/G/H/F: CPT | Performed by: DERMATOLOGY

## 2024-01-31 RX ORDER — MULTIVITAMIN
1 TABLET ORAL DAILY
COMMUNITY

## 2024-01-31 NOTE — PROGRESS NOTES
MOHS Procedure Note    Patient: Yazmin Mooney  : 1970  MRN: [id number]  Date: 2024    History of Present Illness: The patient is a 53 y.o. female who presents with complaints of Basal Cell Carcinoma on the Right Cheek.    Past Medical History:   Diagnosis Date    Abnormal Pap smear of cervix     HPV and PAP smear 2020 normal     Anxiety     Anxiety and depression     no longer on meds    Basal cell carcinoma 2023    Right Cheek; MOHS    Breast lump     hx of right breast lump in lumpectomy in     Depression     Migraine     Nicotine dependence     currently smoker, 33 pack year hx       Past Surgical History:   Procedure Laterality Date    BREAST EXCISIONAL BIOPSY Right     excisional- done in [place]    BREAST LUMPECTOMY Right 2014    benign     SECTION      x's 4    COLONOSCOPY  2020    DILATION AND CURETTAGE, DIAGNOSTIC / THERAPEUTIC      pre cancerous cells?    MAMMO (HISTORICAL)  2018    MOHS SURGERY Right 2024    BCC Right Cheek; Dr. Muller    MULTIPLE TOOTH EXTRACTIONS      X2    TUBAL LIGATION      US GUIDED BREAST BIOPSY LEFT COMPLETE Left 12/15/2022         Current Outpatient Medications:     buPROPion (WELLBUTRIN XL) 150 mg 24 hr tablet, Take 1 tablet (150 mg total) by mouth every morning, Disp: 30 tablet, Rfl: 5    cholecalciferol (VITAMIN D3) 400 units tablet, Take 2 tablets (800 Units total) by mouth daily, Disp: 60 tablet, Rfl: 2    Multiple Vitamin (multivitamin) tablet, Take 1 tablet by mouth daily, Disp: , Rfl:     albuterol (Ventolin HFA) 90 mcg/act inhaler, Inhale 2 puffs every 6 (six) hours as needed for wheezing (Patient not taking: Reported on 2024), Disp: 18 g, Rfl: 5    Current Facility-Administered Medications:     lidocaine (XYLOCAINE) 1 % injection 2 mL, 2 mL, Injection, , Kaleb Muro DPM, 2 mL at 23 1515    triamcinolone acetonide (KENALOG-40) 40 mg/mL injection 20 mg, 20 mg, Intra-articular, , Kaleb Muro DPM, 20 mg at  11/30/23 1515    No Known Allergies    Physical Exam:   Vitals:    01/31/24 0957   BP: 109/55   Pulse: 70   Temp: (!) 97.3 °F (36.3 °C)   SpO2: 100%     General: Awake, Alert, Oriented x 3, Mood and affect appropriate  Respiratory: Respirations even and unlabored  Cardiovascular: Peripheral pulses intact; no edema  Musculoskeletal Exam: N/A    Assessment: 1 x 1.4 cm pink atrophic plaque in location of prior bx. Biopsy proven to be a Basal Cell Carcinoma, nodular and morpheaform type, on the Right Cheek.    Plan: MOHS    Time of H&P Completion:1002    MOHS Procedure Timeout      Flowsheet Row Most Recent Value   Timeout: 1013   Patient Identity Verified: Yes   Correct Site Verified: Yes   Correct Procedure Verified: Yes            MOHS Diagnosis/Indication/Location/ID      Flowsheet Row Most Recent Value   Pathology Type Basal cell carcinoma   Anatomic Site right cheek or buccal area   Indications for MOHS tumor location   MOHS ID [id number]            MOHS Site/Accession/Pre-Post      Flowsheet Row Most Recent Value   Original Site Identified (as submitted by referring clinician) Photo, Note   Biopsy Accession/Specimen # (as submitted by referring clincian) [id number]   Pre-MOHS Size Length (cm) 1   Pre-MOHS Size Width (cm) 1.4   Post-MOHS Size-Length (cm) 2   Post MOHS Size-Width (cm) 1.8   Repair Type Advancement flap   Suture Type Vicryl, Prolene   Prolene Suture Size 5   Vicryl Suture Size 5   Flap/Graft area (cm2) 12.6   Anesthetic Used 1% Lidocaine with epinephrine            MOHS Tumor Stage 1 Information      Flowsheet Row Most Recent Value   Tissue Sections (blocks) 2   Microscopic Exam Section 1: Irregularly shaped cords and strands of basaloid keratinocytes infiltrate the dermis with a spiky growth pattern. The nuclei at the periphery of the islands have a palisaded arrangement. Islands are associated with a fibromyxoid stroma and clefting.   Microscopic Exam Section 2: Irregularly shaped cords and strands  of basaloid keratinocytes infiltrate the dermis with a spiky growth pattern. The nuclei at the periphery of the islands have a palisaded arrangement. Islands are associated with a fibromyxoid stroma and clefting.   Tumor Clear After Stage I? No            MOHS Tumor Stage 2 Information      Flowsheet Row Most Recent Value   Tissue Sections (blocks) 1   Microscopic Exam Section 1: Irregularly shaped cords and strands of basaloid keratinocytes infiltrate the dermis with a spiky growth pattern. The nuclei at the periphery of the islands have a palisaded arrangement. Islands are associated with a fibromyxoid stroma and clefting.   Tumor Clear After Stage II? No            MOHS Tumor Stage 3 Information      Flowsheet Row Most Recent Value   Tissue Sections (blocks) 1   Microscopic Exam Section 1: No tumor identified in section.   Tumor Clear After Stage III? Yes                    Patient identified, procedure verified, site identified and verified. Time out completed. Surgical removal of the lesion discussed with the patient (risks and benefits, including possibility of scarring, infection, recurrence or potential for further treatment)  I have specifically identified the site with the patient. I have discussed the fact that the patient will have a scar after the procedure regardless of granulation or repair with sutures. I have discussed that the repair options can range from granulation in some cases to linear or curvilinear closures to larger flaps or grafts.  There are sometimes flaps or grafts used that require multiples stages of surgery and will not be completed today, rather be completed over a series of appointments. I have discussed that occasionally due to location, size or depth of the lesion I may recommend consultation with and transfer of care for further removal or the reconstruction to another provider such as ophthalmology surgery, plastic surgery, ENT surgery, or surgical oncology. There are cases in  which other testing such as imaging with MRI or CT scan or testing of lymph nodes is recommended because of the nature/depth/location of tumor seen during the removal. There is a risk of injury to nerves causing temporary or permanent numbness or the inability to move muscles full such as the inability to lift eyebrows. Questions answered and verbal and written consent was obtained.    The tumor qualifies for Mohs based on AUC criteria. Dr. Muller served as the surgeon and pathologist during the procedure.    With the patient in the supine position and under adequate local anesthesia with 1% lidocaine with epinephrine 1:100,000, the defect was scrubbed with Chlorhexidine. Sterile drapes were placed from the sterile tray.      Because of the location of the surgical defect,  an advancement flap was judged to give the best possible cosmetic and functional result.  The edges of the defect were carefully debrided removing any dead or coagulated tissue.  The edges of the defect and the area to be used for advancement of tissue at the base of the flap were minimally undermined.  The advancement flap was freed up and draped over the defect. The flap was secured in place by a dermal layer of sutures and the cutaneous margins were approximated and closed by superficial sutures, as noted above.  The standing cones of tissue at the end of the excision were excised with a #15 scalpel blade and closed in two layers as described previously.      The patient tolerated the procedure well. The wound was dressed with petrolatum, a non-stick pad, and a compression dressing.     Dustin Muller MD served as the surgeon and pathologist during the procedure.    Postoperative care: Wound care discussed at length.  I urged the patient to call us if any problems or question should arise.     Complications: none  Post-op medications: none  Patient condition after procedure: stable  Discharge plans: Plan for return to Mohs for suture  removal, as scheduled in 7 days.     Infiltrative BCC cleared with 3 stages of mohs and repaired with 12.6cm2 advancement flap after detailed discussion of repair options.  Well tolerated.  S/R in 1 week.    Scribe Attestation      I,:  Luz Keys MA am acting as a scribe while in the presence of the attending physician.:       I,:  Dustin Muller MD personally performed the services described in this documentation    as scribed in my presence.:

## 2024-01-31 NOTE — LETTER
February 5, 2024     Patient: Yazmin Mooney  YOB: 1970  Date of Visit: 1/31/2024      To Whom it May Concern:    Yazmin Mooney is under my professional care. Yazmin was seen in my office on 1/31/2024. Yazmin may return to work on February 11, 2024 .    If you have any questions or concerns, please don't hesitate to call.         Sincerely,          Dustin Muller MD        CC: No Recipients

## 2024-01-31 NOTE — PATIENT INSTRUCTIONS
"Mohs Microscopic Surgery After Care    WOUND CARE AFTER SURGERY:    Do NOT to remove the pressure bandage for 48 hours. Keep the area clean and dry while this bandage is on.    After removing the bandage for the first time, gently clean the area with soap and water. If the bandage is difficult to remove, getting the bandage wet in the shower will sometimes help soften the adhesive and allow it to be removed more easily.     You will now need to cleanse this area daily in the shower with gentle soap. There is no need to scrub the area. Apply plain Vaseline ointment (this is over the counter and not a prescription) to the site followed by a clean appropriately sized bandage to area.  Non stick dressing and paper tape (or Hypafix) are recommended for sensitive skin but a bandaid is fine if it covers the area well.    You will need to continue the above daily wound care until you return for suture removal in 7 days (generally 7 days for the face, 10-14 days off the face)      RESTRICTIONS:     For two DAYS:   - You will need to take it very easy as this time is highest risk for bleeding. Being a \"couch potato\" during these two days is generally recommended.   - For surgeries on the face/neck/scalp: Avoid leaning down to pick things up off the floor as this brings blood up to your head. Instead, squat down to pick things up.     For two WEEKS:   - No heavy lifting (anything greater than 10 pounds)   - You can start to do slow, gentle activities such as slow walking but nothing to increase your heart rate and blood pressure too much (such as cardiovascular exercise). It is important to take it easy as there is still a risk for bleeding and a high risk popping of stitches open during this time.     If we did surgery near the eyes (including the nose, forehead, front part of your scalp, cheeks): It is VERY common to get a large amount of swelling around your eyes (puffy eyes). Although less frequent, this can be enough to " swell your eyes shut and can also come along with bruising. This should not hurt and is very expected and normal. It is typically worst at ~ 3 days out from your surgery and dramatically better 1 week post-operatively.       MANAGING YOUR PAIN AFTER SURGERY     You can expect to have some pain after surgery. This is normal. The pain is typically worse the first two days after surgery, and quickly begins to get better.     The best strategy for controlling your pain after surgery is around the clock pain control. You can take over the counter Acetaminophen (Tylenol) for discomfort, if no contraindications.     If you are taking this at the maximum dose, you can alternate this with Motrin (ibuprofen or Advil) as well. Alternating these medications with each other allows you to maximize your pain control. In addition to Tylenol and Motrin, you can use heating pads or ice packs on your incisions to help reduce your pain.     How will I alternate your regular strength over-the-counter pain medication?  You will take a dose of pain medication every three hours.   Start by taking 650 mg of Tylenol (2 pills of 325 mg)   3 hours later take 600 mg of Motrin (3 pills of 200 mg)   3 hours after taking the Motrin take 650 mg of Tylenol   3 hours after that take 600 mg of Motrin.    See example - if your first dose of Tylenol is at 12:00 PM     12:00 PM  Tylenol 650 mg (2 pills of 325 mg)    3:00 PM  Motrin 600 mg (3 pills of 200 mg)    6:00 PM  Tylenol 650 mg (2 pills of 325 mg)    9:00 PM  Motrin 600 mg (3 pills of 200 mg)    Continue alternating every 3 hours      Important:   Do not take more than 4000mg of Tylenol or 3200mg of Motrin in a 24-hour period.     What if I still have pain?   If you have pain that is not controlled with the over-the-counter pain medications (Tylenol and Motrin or Advil), don't hesitate to call our staff using the number provided. We will help make sure you are managing your pain in the best way  possible, and if necessary, we can provide a prescription for additional pain medication.     CALL OUR OFFICE IMMEDIATELY FOR ANY SIGNS OF INFECTION:    This includes fever, chills, increased redness, warmth, tenderness, severe discomfort/pain, or pus or foul smell coming from the wound. If you are experiencing any of the above, please call [facility] directly at [phone].    IF BLEEDING IS NOTICED:    Place a clean cloth over the area and apply firm pressure for thirty minutes.  Check the wound ONLY after 30 minutes of direct pressure; do not cheat and sneak a peak, as that does not count.  If bleeding persists after 30 minutes of legitimate direct pressure, then try one more round of direct pressure to the area.  Should the bleeding become heavier or not stop after the second attempt, call [facility] directly at [phone]. Your call will get routed to the dermatology surgeon on call even after hours.

## 2024-02-05 ENCOUNTER — TELEPHONE (OUTPATIENT)
Age: 54
End: 2024-02-05

## 2024-02-05 NOTE — TELEPHONE ENCOUNTER
Called patient back no answer. LVM to inform her that employer did fax over some forms for the office to fill out on our end and it was completed today from the office. If any other question to please call us back.

## 2024-02-05 NOTE — TELEPHONE ENCOUNTER
Patient has some forms from her employment and would need provider to fill it out due to her being out for more than 5 days. She will print our forms and bring it into the office.    I did advise that I wasn't sure if provider was in but that someone would review it and if it could be filled out that same day they would and she could wait. If not then she would be made aware.

## 2024-02-08 ENCOUNTER — OFFICE VISIT (OUTPATIENT)
Dept: DERMATOLOGY | Facility: CLINIC | Age: 54
End: 2024-02-08

## 2024-02-08 DIAGNOSIS — Z48.02 ENCOUNTER FOR REMOVAL OF SUTURES: Primary | ICD-10-CM

## 2024-02-08 PROCEDURE — 99024 POSTOP FOLLOW-UP VISIT: CPT | Performed by: STUDENT IN AN ORGANIZED HEALTH CARE EDUCATION/TRAINING PROGRAM

## 2024-02-08 NOTE — PROGRESS NOTES
"Suture removal    Date/Time: 2/8/2024 8:30 AM    Performed by: Arina Tavarez RN  Authorized by: Agueda Patton MD  Universal Protocol:  Consent: Verbal consent obtained. Written consent not obtained.  Risks and benefits: risks, benefits and alternatives were discussed  Consent given by: patient  Time out: Immediately prior to procedure a \"time out\" was called to verify the correct patient, procedure, equipment, support staff and site/side marked as required.  Timeout called at: 2/8/2024 8:12 AM.  Patient understanding: patient states understanding of the procedure being performed  Patient consent: the patient's understanding of the procedure matches consent given  Procedure consent: procedure consent matches procedure scheduled  Relevant documents: relevant documents present and verified  Test results: test results not available  Site marked: the operative site was not marked  Radiology Images displayed and confirmed. If images not available, report reviewed: imaging studies not available  Patient identity confirmed: verbally with patient      Patient location:  Clinic  Location:     Laterality:  Right    Location:  Head/neck    Head/neck location:  Cheek    Cheek location:  R cheek  Procedure details:     Tools used:  Suture removal kit    Wound appearance:  No sign(s) of infection, good wound healing, moist, nonpurulent, nontender, pink and clean    Number of sutures removed:  15  Post-procedure details:     Post-removal:  Band-Aid applied (vaseline ointment applied)    Patient tolerance of procedure:  Tolerated well, no immediate complications  Comments:      Patient was encouraged to continue to clean and care for the wound until fully healed. Patient was encouraged to continue to follow up for regular full body skin exams as scheduled.  Patient will return in 1 month for repeat wound check with Dr. Muller to see how scar is progressing. She was concerned about the swelling around her eye. Did assure this " will go down with time as it heals. She will apply vaseline and a bandage just to the small crusted spot on the middle of the scar until fully healed. No further questions at this time.         Scribe Attestation      I,:  Arina Tavarez RN am acting as a scribe while in the presence of the attending physician.:       I,:  Agueda Patton MD personally performed the services described in this documentation    as scribed in my presence.:

## 2024-02-21 PROBLEM — Z01.419 ENCOUNTER FOR GYNECOLOGICAL EXAMINATION (GENERAL) (ROUTINE) WITHOUT ABNORMAL FINDINGS: Status: RESOLVED | Noted: 2021-05-13 | Resolved: 2024-02-21

## 2024-03-11 ENCOUNTER — OFFICE VISIT (OUTPATIENT)
Dept: DERMATOLOGY | Facility: CLINIC | Age: 54
End: 2024-03-11

## 2024-03-11 DIAGNOSIS — L90.5 SCAR: Primary | ICD-10-CM

## 2024-03-11 PROCEDURE — 99024 POSTOP FOLLOW-UP VISIT: CPT | Performed by: DERMATOLOGY

## 2024-03-11 NOTE — PROGRESS NOTES
WOUND CHECK    Physical Exam:  Anatomic Location Affected:  right cheek  Description of wound: well healing wound, free form signs or symptoms of infection, one small spitting suture removed.  Closure Type: advancement flap    Additional History of Present Condition:  s/p Mohs sx on 1/31/24    Assessment and Plan:  Based on a thorough discussion of this condition and the management approach to it (including a comprehensive discussion of the known risks, side effects and potential benefits of treatment), the patient (family) agrees to implement the following specific plan:  Patient will begin scar away silicon gel scar massage daily.   Patient will continue to cleanse wound daily and apply sunscreen spf 30 or higher daily.   Patient will call if there are any questions or concerns regarding this site.          Healing scar, discussed scar massage and sun avoidance.  F/U in 2 months for recheck.    Scribe Attestation      I,:  Arina Tavarez RN am acting as a scribe while in the presence of the attending physician.:       I,:  Dustin Muller MD personally performed the services described in this documentation    as scribed in my presence.:

## 2024-06-06 ENCOUNTER — HOSPITAL ENCOUNTER (OUTPATIENT)
Dept: RADIOLOGY | Age: 54
Discharge: HOME | End: 2024-06-06
Payer: COMMERCIAL

## 2024-06-06 VITALS — WEIGHT: 98 LBS | HEIGHT: 61 IN | BODY MASS INDEX: 18.5 KG/M2

## 2024-06-06 DIAGNOSIS — Z12.31 VISIT FOR SCREENING MAMMOGRAM: ICD-10-CM

## 2024-06-06 PROCEDURE — 77063 BREAST TOMOSYNTHESIS BI: CPT

## 2024-06-06 PROCEDURE — 77067 SCR MAMMO BI INCL CAD: CPT

## 2024-07-01 ENCOUNTER — TELEPHONE (OUTPATIENT)
Age: 54
End: 2024-07-01

## 2024-07-01 ENCOUNTER — OFFICE VISIT (OUTPATIENT)
Dept: DERMATOLOGY | Facility: CLINIC | Age: 54
End: 2024-07-01
Payer: COMMERCIAL

## 2024-07-01 DIAGNOSIS — Z48.89 ENCOUNTER FOR POST SURGICAL WOUND CHECK: Primary | ICD-10-CM

## 2024-07-01 PROCEDURE — 99212 OFFICE O/P EST SF 10 MIN: CPT | Performed by: DERMATOLOGY

## 2024-07-01 RX ORDER — TRIAMCINOLONE ACETONIDE 1 MG/G
OINTMENT TOPICAL 2 TIMES DAILY
Qty: 15 G | Refills: 0 | Status: SHIPPED | OUTPATIENT
Start: 2024-07-01 | End: 2024-07-15

## 2024-07-01 NOTE — TELEPHONE ENCOUNTER
Patient called asking to come in as soon as she can because where she had MOH's done now is very swollen, red and she's in pain     I asked patient if she can hold so I can contact [facility] to see how we can help to bring in patient sooner than apt available for me to schedule her.    Tried to get in touch with someone no reply, came back to patient to ask if she can still hold, she stated she'll call back because she's at work and her break is over.

## 2024-07-01 NOTE — PROGRESS NOTES
WOUND CHECK    Physical Exam:  Anatomic Location Affected:  right cheek  Description of wound: well healed wound, some redness and swelling present   Closure Type: advancement flap    Additional History of Present Condition:  s/p Mohs sx on 1/31/24, patient complains of x 2 days itching and redness with some swelling.    Assessment and Plan:  Based on a thorough discussion of this condition and the management approach to it (including a comprehensive discussion of the known risks, side effects and potential benefits of treatment), the patient (family) agrees to implement the following specific plan:  No signs orf infection or spitting suture present.   Triamcinolone 0.1% ointment prescribed today for use in this area to help with redness and itching.   Patient will return in 2 weeks for repeat wound check.           Well healing scar with erythema and pruritus.  Discussed treatment options and will start with triamcinolone ointment bid for up to 2 weeks or until clear.  Medication side effects discussed and instructions given.      Scribe Attestation      I,:  Arina Tavarez RN am acting as a scribe while in the presence of the attending physician.:       I,:  Dustin Muller MD personally performed the services described in this documentation    as scribed in my presence.:

## 2024-07-01 NOTE — TELEPHONE ENCOUNTER
Patient called she had mohs surgery 1/31/24 on the right cheek she is now complains of the right cheek being red right eye swollen and cheek warm to the touch itchy hurts and burns she did send a picture through [facility]

## 2024-07-17 ENCOUNTER — OFFICE VISIT (OUTPATIENT)
Dept: DERMATOLOGY | Facility: CLINIC | Age: 54
End: 2024-07-17
Payer: COMMERCIAL

## 2024-07-17 DIAGNOSIS — Z48.89 ENCOUNTER FOR POST SURGICAL WOUND CHECK: Primary | ICD-10-CM

## 2024-07-17 PROCEDURE — 99212 OFFICE O/P EST SF 10 MIN: CPT | Performed by: DERMATOLOGY

## 2024-07-17 NOTE — PROGRESS NOTES
WOUND CHECK    Physical Exam:  Anatomic Location Affected:  right cheek  Description of wound: well healing wound, free from signs or symptoms of infection  Closure Type: advancement flap    Additional History of Present Condition:  s/p Mohs sx on 1/31/2024    Assessment and Plan:  Based on a thorough discussion of this condition and the management approach to it (including a comprehensive discussion of the known risks, side effects and potential benefits of treatment), the patient (family) agrees to implement the following specific plan:  Patient will continue to monitor site.   Patient will stop using triamcinolone ointment at this time.   Patient will call if there are any questions or concerns.        Well healing scar and redness improved with triamcinolone use.  Recommended holding it for now.  Patient did have some other pruritic sites on back and discussed she could use it there as directed.  Medication side effects discussed and instructions given.  F/U wound check prn.    Scribe Attestation      I,:  Arina Tavarez RN am acting as a scribe while in the presence of the attending physician.:       I,:  Dustin Muller MD personally performed the services described in this documentation    as scribed in my presence.:

## 2024-10-10 ENCOUNTER — TELEPHONE (OUTPATIENT)
Dept: OTHER | Facility: OTHER | Age: 54
End: 2024-10-10

## 2024-10-10 NOTE — TELEPHONE ENCOUNTER
Patient called to confirm what time her appointment is for. She stated that she had two appointments in her MyChart for the same provider. One appointment was for today at 7 am and one is for tomorrow at 2 pm. Verified that her OBGYN appointment is for tomorrow October 10, 2024 at 2 pm.

## 2024-10-11 ENCOUNTER — ANNUAL EXAM (OUTPATIENT)
Dept: OBGYN CLINIC | Facility: CLINIC | Age: 54
End: 2024-10-11
Payer: COMMERCIAL

## 2024-10-11 ENCOUNTER — TELEPHONE (OUTPATIENT)
Age: 54
End: 2024-10-11

## 2024-10-11 VITALS
BODY MASS INDEX: 19.3 KG/M2 | DIASTOLIC BLOOD PRESSURE: 64 MMHG | HEIGHT: 61 IN | WEIGHT: 102.2 LBS | SYSTOLIC BLOOD PRESSURE: 100 MMHG

## 2024-10-11 DIAGNOSIS — N95.1 VAGINAL DRYNESS, MENOPAUSAL: ICD-10-CM

## 2024-10-11 DIAGNOSIS — Z11.51 SCREENING FOR HPV (HUMAN PAPILLOMAVIRUS): ICD-10-CM

## 2024-10-11 DIAGNOSIS — Z01.419 ENCOUNTER FOR GYNECOLOGICAL EXAMINATION (GENERAL) (ROUTINE) WITHOUT ABNORMAL FINDINGS: Primary | ICD-10-CM

## 2024-10-11 PROCEDURE — G0145 SCR C/V CYTO,THINLAYER,RESCR: HCPCS | Performed by: PHYSICIAN ASSISTANT

## 2024-10-11 PROCEDURE — G0476 HPV COMBO ASSAY CA SCREEN: HCPCS | Performed by: PHYSICIAN ASSISTANT

## 2024-10-11 PROCEDURE — S0612 ANNUAL GYNECOLOGICAL EXAMINA: HCPCS | Performed by: PHYSICIAN ASSISTANT

## 2024-10-11 RX ORDER — ESTRADIOL 0.1 MG/G
1 CREAM VAGINAL 2 TIMES WEEKLY
Qty: 42.5 G | Refills: 3 | Status: SHIPPED | OUTPATIENT
Start: 2024-10-14

## 2024-10-11 NOTE — PROGRESS NOTES
Assessment   54 y.o.  presenting for annual exam.     Plan   Diagnoses and all orders for this visit:    Encounter for gynecological examination (general) (routine) without abnormal findings  -     Liquid-based pap, screening    Screening for HPV (human papillomavirus)  -     Liquid-based pap, screening    Vaginal dryness, menopausal  -     estradiol (ESTRACE VAGINAL) 0.1 mg/g vaginal cream; Insert 1 g into the vagina 2 (two) times a week        Pap collected today  Mammo scheduled   Colonoscopy up to date   Vaginal dryness and atrophy- trial estrace cream. Rx re-sent    Perineal hygiene reviewed. Weight bearing exercises minium of 150 mins/weekly advised. Kegel exercises recommended.   SBE encouraged, A yearly mammogram is recommended for breast cancer screening starting at age 40. ASCCP guidelines reviewed. Calcium/ Vit D dietary requirements discussed.   Advised to call with any issues, all concerns & questions addressed.   See provided information in your after visit summary     F/U Annually and PRN    Results will be released to [employer], if abnormal will call or message to review and discuss treatment plan.     __________________________________________________________________    Subjective     Yazmin Mooney is a 54 y.o.  presenting for annual exam. She is without complaint     Never started estrace cream for dryness, painful intercourse, and atrophy as prescribed last year. She is still experiencing these symptoms and would like to try estrace cream    SCREENING  Last Pap: 2020 neg/neg  Last Mammo: 2024 birads 2  Last Colonoscopy: 2020 5 year recall (see note from 2023)      GYN  , no VB    Sexually active:  not currently    Hx Abnormal pap: denies  We reviewed ASCCP guidelines for Pap testing today.    Denies vaginal discharge, itching, odor, dyspareunia, pelvic pain and vulvar/vaginal symptoms      OB           Complaints: denies   Denies urgency, frequency,  hematuria, leakage / change in stream, difficulty urinating.       BREAST  Complaints: denies   Denies: breast lump, breast tenderness, nipple discharge, skin color change, and skin lesion(s)  Personal hx: hx of left breast biopsy; hx of right lumpectomy      Pertinent Family Hx:   Family hx of breast cancer: no  Family hx of ovarian cancer: no  Family hx of colon cancer: no      GENERAL  PMH reviewed/updated and is as below.   Patient does follow with a PCP.    SOCIAL  Smokin ppd smoker  Alcohol: no  Drug: no  Occupation: Lead at [employer]       Past Medical History:   Diagnosis Date    Abnormal Pap smear of cervix     HPV and PAP smear 2020 normal     Anxiety     Anxiety and depression     no longer on meds    Basal cell carcinoma 2023    Right Cheek; MOHS    Breast lump     hx of right breast lump in lumpectomy in     Depression     Migraine     Nicotine dependence     currently smoker, 33 pack year hx       Past Surgical History:   Procedure Laterality Date    BREAST EXCISIONAL BIOPSY Right     excisional- done in [place]    BREAST LUMPECTOMY Right     benign     SECTION      x's 4    COLONOSCOPY  2020    DILATION AND CURETTAGE, DIAGNOSTIC / THERAPEUTIC      pre cancerous cells?    MAMMO (HISTORICAL)  2018    MOHS SURGERY Right 2024    BCC Right Cheek; Dr. Muller    MULTIPLE TOOTH EXTRACTIONS      X2    TUBAL LIGATION      US GUIDED BREAST BIOPSY LEFT COMPLETE Left 12/15/2022         Current Outpatient Medications:     cholecalciferol (VITAMIN D3) 400 units tablet, Take 2 tablets (800 Units total) by mouth daily, Disp: 60 tablet, Rfl: 2    Multiple Vitamin (multivitamin) tablet, Take 1 tablet by mouth daily, Disp: , Rfl:     albuterol (Ventolin HFA) 90 mcg/act inhaler, Inhale 2 puffs every 6 (six) hours as needed for wheezing (Patient not taking: Reported on 2024), Disp: 18 g, Rfl: 5    buPROPion (WELLBUTRIN XL) 150 mg 24 hr tablet, Take 1 tablet (150  mg total) by mouth every morning (Patient not taking: Reported on 10/11/2024), Disp: 30 tablet, Rfl: 5    triamcinolone (KENALOG) 0.1 % ointment, Apply topically 2 (two) times a day for 14 days (Patient not taking: Reported on 10/11/2024), Disp: 15 g, Rfl: 0    Current Facility-Administered Medications:     lidocaine (XYLOCAINE) 1 % injection 2 mL, 2 mL, Injection, , Kaleb Muro, BESSYM, 2 mL at 23 1515    triamcinolone acetonide (KENALOG-40) 40 mg/mL injection 20 mg, 20 mg, Intra-articular, , Kaleb Muro, DPM, 20 mg at 23 1515    No Known Allergies    Social History     Socioeconomic History    Marital status: /Civil Union     Spouse name: Not on file    Number of children: Not on file    Years of education: 2 yr college    Highest education level: Not on file   Occupational History    Occupation: supervisor    Tobacco Use    Smoking status: Every Day     Current packs/day: 0.50     Average packs/day: 0.5 packs/day for 33.0 years (16.5 ttl pk-yrs)     Types: Cigarettes    Smokeless tobacco: Never   Vaping Use    Vaping status: Some Days    Substances: Nicotine   Substance and Sexual Activity    Alcohol use: Never    Drug use: Never    Sexual activity: Not Currently     Partners: Male     Birth control/protection: Post-menopausal   Other Topics Concern    Not on file   Social History Narrative    Most recent tobacco use screenin2019    Do you currently or have you served in the [email or web address]: No    Were you activated, into active duty, as a member of the [employer] or as a Reservist: No    Occupation: supervisor    Education: 2 Year College    General stress level: Medium    Exercise level: None    Diet: Regular    Marital status:     Sexual orientation: Heterosexual    Alcohol intake: None    Live alone or with others: with others    Caffeine intake: Moderate    coffee daily    Illicit drugs: Denies    Sexually active: Yes    Seat belts used routinely: Yes    Sunscreen used  "routinely: No     Social Determinants of Health     Financial Resource Strain: Low Risk  (3/25/2022)    Overall Financial Resource Strain (CARDIA)     Difficulty of Paying Living Expenses: Not hard at all   Food Insecurity: No Food Insecurity (3/25/2022)    Hunger Vital Sign     Worried About Running Out of Food in the Last Year: Never true     Ran Out of Food in the Last Year: Never true   Transportation Needs: No Transportation Needs (3/25/2022)    PRAPARE - Transportation     Lack of Transportation (Medical): No     Lack of Transportation (Non-Medical): No   Physical Activity: Sufficiently Active (4/13/2021)    Exercise Vital Sign     Days of Exercise per Week: 4 days     Minutes of Exercise per Session: 150+ min   Stress: Not on file   Social Connections: Not on file   Intimate Partner Violence: Not on file   Housing Stability: Not on file       Review of Systems     ROS:  Constitutional: Negative for fatigue and unexpected weight change.   Respiratory: Negative for cough and shortness of breath.    Cardiovascular: Negative for chest pain and palpitations.   Gastrointestinal: Negative for abdominal pain and change in bowel habits  Breasts:  Negative, other than as noted above.   Genitourinary: Negative, other than as noted above.   Psychiatric: Negative for mood difficulties.      Objective      /64 (BP Location: Left arm, Patient Position: Sitting, Cuff Size: Adult)   Ht 5' 1\" (1.549 m)   Wt 46.4 kg (102 lb 3.2 oz)   LMP  (LMP Unknown)   BMI 19.31 kg/m²     Physical Examination:    Patient appears well and is not in distress  Neck is supple without masses, no cervical or supraclavicular lymphadenopathy  Cardiovascular: regular rate and rhythm; no murmurs  Lungs: clear to auscultation bilaterally; no wheezes  Breasts are symmetrical without mass, tenderness, nipple discharge, skin changes or adenopathy.   Abdomen is soft and nontender without masses.   External genitals are normal without lesions or " rashes.  Urethral meatus and urethra are normal  Bladder is normal to palpation  Vagina is normal without discharge or bleeding. Atrophic changes noted  Cervix is normal without discharge or lesion.   Uterus is normal, mobile, nontender without palpable mass.  Adnexa are normal, nontender, without palpable mass.

## 2024-10-11 NOTE — TELEPHONE ENCOUNTER
Patient had called to see if we had any later appointment time today. She was having trouble setting up a ride on [employer] for her appointment at 2:00. Patient was on the line and said that her car maybe ready and asked me to hold and then the call disconnected.

## 2024-10-18 LAB
LAB AP GYN PRIMARY INTERPRETATION: NORMAL
Lab: NORMAL

## 2024-11-21 ENCOUNTER — OFFICE VISIT (OUTPATIENT)
Dept: INTERNAL MEDICINE CLINIC | Facility: CLINIC | Age: 54
End: 2024-11-21

## 2024-11-21 VITALS
DIASTOLIC BLOOD PRESSURE: 73 MMHG | HEIGHT: 61 IN | TEMPERATURE: 98 F | BODY MASS INDEX: 20.01 KG/M2 | SYSTOLIC BLOOD PRESSURE: 110 MMHG | WEIGHT: 106 LBS | HEART RATE: 71 BPM

## 2024-11-21 DIAGNOSIS — E55.9 VITAMIN D DEFICIENCY: ICD-10-CM

## 2024-11-21 DIAGNOSIS — Z23 NEED FOR COVID-19 VACCINE: ICD-10-CM

## 2024-11-21 DIAGNOSIS — Z13.9 SCREENING DUE: ICD-10-CM

## 2024-11-21 DIAGNOSIS — C44.319 BASAL CELL CARCINOMA (BCC) OF RIGHT CHEEK: ICD-10-CM

## 2024-11-21 DIAGNOSIS — F17.219 CIGARETTE NICOTINE DEPENDENCE WITH NICOTINE-INDUCED DISORDER: ICD-10-CM

## 2024-11-21 DIAGNOSIS — N95.1 HOT FLASHES DUE TO MENOPAUSE: ICD-10-CM

## 2024-11-21 DIAGNOSIS — Z00.00 ANNUAL PHYSICAL EXAM: Primary | ICD-10-CM

## 2024-11-21 DIAGNOSIS — M54.50 ACUTE BILATERAL LOW BACK PAIN, UNSPECIFIED WHETHER SCIATICA PRESENT: ICD-10-CM

## 2024-11-21 DIAGNOSIS — G25.81 RESTLESS LEG SYNDROME: ICD-10-CM

## 2024-11-21 DIAGNOSIS — Z23 ENCOUNTER FOR IMMUNIZATION: ICD-10-CM

## 2024-11-21 DIAGNOSIS — Z13.31 POSITIVE DEPRESSION SCREENING: ICD-10-CM

## 2024-11-21 PROCEDURE — 91320 SARSCV2 VAC 30MCG TRS-SUC IM: CPT | Performed by: STUDENT IN AN ORGANIZED HEALTH CARE EDUCATION/TRAINING PROGRAM

## 2024-11-21 PROCEDURE — 90471 IMMUNIZATION ADMIN: CPT | Performed by: STUDENT IN AN ORGANIZED HEALTH CARE EDUCATION/TRAINING PROGRAM

## 2024-11-21 PROCEDURE — 90472 IMMUNIZATION ADMIN EACH ADD: CPT | Performed by: STUDENT IN AN ORGANIZED HEALTH CARE EDUCATION/TRAINING PROGRAM

## 2024-11-21 PROCEDURE — 90750 HZV VACC RECOMBINANT IM: CPT | Performed by: STUDENT IN AN ORGANIZED HEALTH CARE EDUCATION/TRAINING PROGRAM

## 2024-11-21 PROCEDURE — 90673 RIV3 VACCINE NO PRESERV IM: CPT | Performed by: STUDENT IN AN ORGANIZED HEALTH CARE EDUCATION/TRAINING PROGRAM

## 2024-11-21 PROCEDURE — 90480 ADMN SARSCOV2 VAC 1/ONLY CMP: CPT | Performed by: STUDENT IN AN ORGANIZED HEALTH CARE EDUCATION/TRAINING PROGRAM

## 2024-11-21 PROCEDURE — 99396 PREV VISIT EST AGE 40-64: CPT | Performed by: STUDENT IN AN ORGANIZED HEALTH CARE EDUCATION/TRAINING PROGRAM

## 2024-11-21 RX ORDER — POLYETHYLENE GLYCOL 3350 17 G
2 POWDER IN PACKET (EA) ORAL AS NEEDED
Qty: 100 EACH | Refills: 0 | Status: SHIPPED | OUTPATIENT
Start: 2024-11-21

## 2024-11-21 RX ORDER — ALBUTEROL SULFATE 90 UG/1
2 INHALANT RESPIRATORY (INHALATION) EVERY 6 HOURS PRN
Qty: 18 G | Refills: 5 | Status: SHIPPED | OUTPATIENT
Start: 2024-11-21

## 2024-11-21 RX ORDER — OMEGA-3S/DHA/EPA/FISH OIL/D3 300MG-1000
800 CAPSULE ORAL DAILY
Qty: 60 TABLET | Refills: 2 | Status: SHIPPED | OUTPATIENT
Start: 2024-11-21 | End: 2025-02-19

## 2024-11-21 RX ORDER — BUPROPION HYDROCHLORIDE 150 MG/1
150 TABLET ORAL EVERY MORNING
Qty: 30 TABLET | Refills: 5 | Status: SHIPPED | OUTPATIENT
Start: 2024-11-21 | End: 2025-05-20

## 2024-11-21 NOTE — PROGRESS NOTES
Adult Annual Physical  Name: Yazmin Mooney      : 1970      MRN: [id number]  Encounter Provider: Kelechi Wells MD  Encounter Date: 2024   Encounter department: [facility]    Assessment & Plan  Annual physical exam  Patient is presenting for an annual physical exam  At this time other than acute issue of back pain, patient is well-appearing and in no acute distress    Plan  Routine physical lab work ordered       Cigarette nicotine dependence with nicotine-induced disorder  Patient has extensive history of tobacco use, with patient endorsing 40+ year pack history of tobacco use with cigarettes  Patient has previously tried Chantrix, Wellbutrin (Though briefly for less than 5 days) as well as Nicoderm  Upon counseling today, patient is agreeable to try Wellbutrin again for smoking cessation given overall risk    Plan  Will prescribe Wellbutrin 150 mg Daily for smoking cessation  Patient will additionally trial Nicotine Lozenge given previous denture history  She is agreeable to schedule CT Lung Screening given last was in   Orders:    buPROPion (WELLBUTRIN XL) 150 mg 24 hr tablet; Take 1 tablet (150 mg total) by mouth every morning    albuterol (Ventolin HFA) 90 mcg/act inhaler; Inhale 2 puffs every 6 (six) hours as needed for wheezing    nicotine polacrilex (COMMIT) 2 MG lozenge; Apply 1 lozenge (2 mg total) to the mouth or throat as needed for smoking cessation    Screening due  Patient's routine lab work per annual physical ordered  Patient follows with Gyn and had normal Pap Smear last month with next screening due in 3-5 years per Gyn  Next screening Mammogram scheduled for 2025  Patient's last colonoscopy in  grew a small tubular adenoma polyp s/p cold forceps removal, with next screening due in   Patient is agreeable for CT Lung Screening given previous tobacco use    Plan  Would confirm with patient next visit if colonoscopy for  is scheduled  to see if patient would need gastroenterology referral for scheduling screening colonoscopy  Orders:    CBC and differential; Future    Comprehensive metabolic panel; Future    Hemoglobin A1C; Future    Lipid panel; Future    TSH + Free T4; Future    Vitamin D 25 hydroxy; Future    CT lung screening program; Future    Basal cell carcinoma (BCC) of right cheek  Patient was diagnosed with BCC of Right Cheek on December 2023 on shave biopsy  S/P Mohs Surgery on January 31, 2024  Had regular follow-up with Dermatology for post-surgical wound check and healing  At this time, patient shows appropriate wound healing of the right cheek and denies any recurrence of similar skin lesion or systemic symptoms at this time    Plan  Continue to follow  Patient recommended to follow with Dermatology as needed per previous recommendations for wound check       Acute bilateral low back pain, unspecified whether sciatica present  Patient's back pain is consistent with lumbar strain given acute onset and lack of red flag symptoms  Low suspicion this is inflammatory etiology as well given relief of pain with rest  Physical exam is also benign with no focal deficits or tenderness noted on palpation  Patient was advised to begin symptomatic treatment for pain control and avoid similar strenuous activities at work that likely precipitated this pain    Plan  Patient advised to begin pain control with OTC Tylenol and Voltaren Gel  PT/OT referral  Orders:    Ambulatory Referral to Physical Therapy; Future    Ambulatory Referral to Occupational Therapy; Future    Diclofenac Sodium (VOLTAREN) 1 %; Apply 2 g topically 4 (four) times a day    Restless leg syndrome  Patient has chronic history of restless leg in the right leg that is not worsened from baseline    Plan  Will get iron panel to rule out concomitant CRISTOFER that can be present in Restless Leg Syndrome  Can consider symptomatic treatment with Gabapentin or Pregabalin if symptoms begin to  worsen  Orders:    Iron Panel (Includes Ferritin, Iron Sat%, Iron, and TIBC); Future    Hot flashes due to menopause  Patient has intermittent hot flashes she attributed to menopause  Patient reports these are stable but still persisting 2-years following LMP  She is contraindicated for hormonal therapy at this time given current tobacco use and previous malignancy history of BCC    Plan  Continue to monitor  Can consider SSRI or SNRI for symptomatic relief if patient's flashes continue to persist       Vitamin D deficiency  Patient has previous history of Vitamin D Deficiency, asking for refill for Vitamin D supplementation  Will also check Vitamin D level at this time  Orders:    cholecalciferol (VITAMIN D3) 400 units tablet; Take 2 tablets (800 Units total) by mouth daily    Positive depression screening  Patient presenting to the office with PHQ=4  During visit, patient endorses intermittent depressive symptoms and difficulty sleeping but these appear to be in the setting of underlying medical problems as well as social circumstances since patient has recently picked up a second job that requires working nights sometimes  She denied any issues with appetite, concentration, anhedonia or suicidal/homicidal ideations  Given her underlying issues, will attempt to treat patient's medical conditions before considering additional psychotropic medication  Patient will also benefit from use of Wellbutrin given current tobacco use    Plan  Continue to monitor  Will reassess depressive symptoms after treatment of underlying medical conditions       Encounter for immunization  Patient agreeable to Flu and Zoster vaccination in office today  Orders:    influenza vaccine, recombinant, PF, 0.5 mL IM (Flublok)    Zoster Vaccine Recombinant IM    Need for COVID-19 vaccine  Patient agreeable to COVID-19 vaccination today  Orders:    COVID-19 Pfizer mRNA vaccine 12 yr and older (Comirnaty pre-filled syringe)    Immunizations and  preventive care screenings were discussed with patient today. Appropriate education was printed on patient's after visit summary.    Counseling:  Alcohol/drug use: discussed moderation in alcohol intake, the recommendations for healthy alcohol use, and avoidance of illicit drug use.  Dental Health: discussed importance of regular tooth brushing, flossing, and dental visits.  Injury prevention: discussed safety/seat belts, safety helmets, smoke detectors, carbon monoxide detectors, and smoking near bedding or upholstery.  Sexual health: discussed sexually transmitted diseases, partner selection, use of condoms, avoidance of unintended pregnancy, and contraceptive alternatives.  Exercise: the importance of regular exercise/physical activity was discussed. Recommend exercise 3-5 times per week for at least 30 minutes.       Depression Screening and Follow-up Plan: Patient was screened for depression during today's encounter. They screened negative with a PHQ-9 score of 4.    Tobacco Cessation Counseling: Tobacco cessation counseling was provided. The patient is sincerely urged to quit consumption of tobacco. She is ready to quit tobacco. Medication options and side effects of medication discussed. Patient agreed to medication. Bupropion SR was prescribed.     Lung Cancer Screening Shared Decision Making: I discussed with her that she is a candidate for lung cancer CT screening.     The following Shared Decision-Making points were covered:  Benefits of screening were discussed, including the rates of reduction in death from lung cancer and other causes.  Harms of screening were reviewed, including false positive tests, radiation exposure levels, risks of invasive procedures, risks of complications of screening, and risk of overdiagnosis.  I counseled on the importance of adherence to annual lung cancer LDCT screening, impact of co-morbidities, and ability or willingness to undergo diagnosis and treatment.  I counseled  on the importance of maintaining abstinence as a former smoker or was counseled on the importance of smoking cessation if a current smoker    Review of Eligibility Criteria: She meets all of the criteria for Lung Cancer Screening.   - She is 54 y.o.   - She has 20 pack year tobacco history and is a current smoker or has quit within the past 15 years  - She presents no signs or symptoms of lung cancer    After discussion, the patient decided to elect lung cancer screening.        History of Present Illness     Adult Annual Physical:  Patient presents for annual physical. Patient is a 54-yo female PMHx of Basal Cell Carcinoma of the Right Cheek s/p Mohs surgery and Current Tobacco Use who is presenting to the office today for an annual physical exam. She reports for this visit a 2-day history of acute back pain. 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 She reports having an intermittent history of back pain previously that was mild with a waxing and waning temporality, but states that this current pain is more severe than before. She says it is worsened when she is working the line, bending forward and when walking an excessive amount which she equates to about 20,000 steps or more since she is on her feet a lot at work. She has not tried any medication or remedies at home for the pain since the onset. She denies any fevers, chills, weakness/numbness, unintentional weight loss, saddle anesthesia, urinary or fecal incontinence or retention. She also reports a chronic history of menopausal hot flashes and restless leg in her right leg that have been on-going for several years. She was diagnosed with BCC earlier this year and had Mohs surgery following diagnosis, currently following with dermatology for it. She otherwise reports no other active symptoms or issues at this time..     Diet and Physical Activity:  -  Diet/Nutrition: well balanced diet.  - Exercise: walking.    Depression Screening:    - PHQ-9 Score: 4    General Health:  - Sleep: 4-6 hours of sleep on average and sleeps poorly.  - Hearing: normal hearing bilateral ears.  - Vision: no vision problems.  - Dental: regular dental visits and brushes teeth twice daily.    /GYN Health:  - Follows with GYN: yes.   - Menopause: postmenopausal.   - Last menstrual cycle: 2022.   - History of STDs: no    Review of Systems   Constitutional:  Negative for chills and fever.   HENT:  Negative for ear pain and sore throat.    Eyes:  Negative for pain and visual disturbance.   Respiratory:  Negative for cough and shortness of breath.    Cardiovascular:  Negative for chest pain and palpitations.   Gastrointestinal:  Negative for abdominal pain and vomiting.   Genitourinary:  Negative for dysuria and hematuria.   Musculoskeletal:  Positive for back pain. Negative for arthralgias.   Skin:  Negative for color change and rash.   Neurological:  Negative for seizures and syncope.   All other systems reviewed and are negative.    Past Medical History   Past Medical History:   Diagnosis Date    Abnormal Pap smear of cervix     HPV and PAP smear 2020 normal     Anxiety     Anxiety and depression     no longer on meds    Basal cell carcinoma 2023    Right Cheek; MOHS    Breast lump     hx of right breast lump in lumpectomy in     Depression     Migraine     Nicotine dependence     currently smoker, 33 pack year hx     Past Surgical History:   Procedure Laterality Date    BREAST EXCISIONAL BIOPSY Right     excisional- done in [place]    BREAST LUMPECTOMY Right     benign     SECTION      x's 4    COLONOSCOPY  2020    DILATION AND CURETTAGE, DIAGNOSTIC / THERAPEUTIC      pre cancerous cells?    MAMMO (HISTORICAL)  2018    MOHS SURGERY Right 2024    BCC Right Cheek; Dr. Muller    MULTIPLE TOOTH EXTRACTIONS      X2    TUBAL LIGATION       US GUIDED BREAST BIOPSY LEFT COMPLETE Left 12/15/2022     Family History   Problem Relation Age of Onset    Hypertension Mother     Aneurysm Mother     Migraines Mother     No Known Problems Father     No Known Problems Sister     Stroke Brother     No Known Problems Brother     No Known Problems Maternal Grandmother     No Known Problems Maternal Grandfather     No Known Problems Paternal Grandmother     No Known Problems Paternal Grandfather     No Known Problems Daughter     No Known Problems Son     No Known Problems Son     No Known Problems Son     No Known Problems Maternal Aunt       reports that she has been smoking cigarettes. She has a 16.5 pack-year smoking history. She has never used smokeless tobacco. She reports that she does not drink alcohol and does not use drugs.  Current Outpatient Medications on File Prior to Visit   Medication Sig Dispense Refill    estradiol (ESTRACE VAGINAL) 0.1 mg/g vaginal cream Insert 1 g into the vagina 2 (two) times a week 42.5 g 3    Multiple Vitamin (multivitamin) tablet Take 1 tablet by mouth daily      triamcinolone (KENALOG) 0.1 % ointment Apply topically 2 (two) times a day for 14 days (Patient not taking: Reported on 10/11/2024) 15 g 0    [DISCONTINUED] albuterol (Ventolin HFA) 90 mcg/act inhaler Inhale 2 puffs every 6 (six) hours as needed for wheezing (Patient not taking: Reported on 1/11/2024) 18 g 5    [DISCONTINUED] buPROPion (WELLBUTRIN XL) 150 mg 24 hr tablet Take 1 tablet (150 mg total) by mouth every morning (Patient not taking: Reported on 10/11/2024) 30 tablet 5    [DISCONTINUED] cholecalciferol (VITAMIN D3) 400 units tablet Take 2 tablets (800 Units total) by mouth daily 60 tablet 2     Current Facility-Administered Medications on File Prior to Visit   Medication Dose Route Frequency Provider Last Rate Last Admin    lidocaine (XYLOCAINE) 1 % injection 2 mL  2 mL Injection  Kaleb Muro DPM   2 mL at 11/30/23 1512    triamcinolone acetonide (KENALOG-40)  40 mg/mL injection 20 mg  20 mg Intra-articular  Kaleb Muro DPM   20 mg at 11/30/23 1515   No Known Allergies   Current Outpatient Medications on File Prior to Visit   Medication Sig Dispense Refill    estradiol (ESTRACE VAGINAL) 0.1 mg/g vaginal cream Insert 1 g into the vagina 2 (two) times a week 42.5 g 3    Multiple Vitamin (multivitamin) tablet Take 1 tablet by mouth daily      triamcinolone (KENALOG) 0.1 % ointment Apply topically 2 (two) times a day for 14 days (Patient not taking: Reported on 10/11/2024) 15 g 0    [DISCONTINUED] albuterol (Ventolin HFA) 90 mcg/act inhaler Inhale 2 puffs every 6 (six) hours as needed for wheezing (Patient not taking: Reported on 1/11/2024) 18 g 5    [DISCONTINUED] buPROPion (WELLBUTRIN XL) 150 mg 24 hr tablet Take 1 tablet (150 mg total) by mouth every morning (Patient not taking: Reported on 10/11/2024) 30 tablet 5    [DISCONTINUED] cholecalciferol (VITAMIN D3) 400 units tablet Take 2 tablets (800 Units total) by mouth daily 60 tablet 2     Current Facility-Administered Medications on File Prior to Visit   Medication Dose Route Frequency Provider Last Rate Last Admin    lidocaine (XYLOCAINE) 1 % injection 2 mL  2 mL Injection  Kaleb Muro DPM   2 mL at 11/30/23 1515    triamcinolone acetonide (KENALOG-40) 40 mg/mL injection 20 mg  20 mg Intra-articular  Kaleb Muro DPM   20 mg at 11/30/23 1515      Social History     Tobacco Use    Smoking status: Every Day     Current packs/day: 0.50     Average packs/day: 0.5 packs/day for 33.0 years (16.5 ttl pk-yrs)     Types: Cigarettes    Smokeless tobacco: Never   Vaping Use    Vaping status: Some Days    Substances: Nicotine   Substance and Sexual Activity    Alcohol use: Never    Drug use: Never    Sexual activity: Not Currently     Partners: Male     Birth control/protection: Post-menopausal       Objective   /73 (BP Location: Right arm, Patient Position: Sitting, Cuff Size: Adult)   Pulse 71   Temp 98 °F (36.7 °C) (Temporal)    "Ht 5' 1\" (1.549 m)   Wt 48.1 kg (106 lb)   LMP  (LMP Unknown)   BMI 20.03 kg/m²     Physical Exam  Vitals reviewed.   Constitutional:       General: She is not in acute distress.     Appearance: Normal appearance. She is normal weight.   HENT:      Head: Normocephalic and atraumatic.      Right Ear: Tympanic membrane, ear canal and external ear normal.      Left Ear: Tympanic membrane, ear canal and external ear normal.      Nose: Nose normal. No rhinorrhea.      Mouth/Throat:      Mouth: Mucous membranes are moist.      Pharynx: Oropharynx is clear. No oropharyngeal exudate or posterior oropharyngeal erythema.   Eyes:      Extraocular Movements: Extraocular movements intact.      Conjunctiva/sclera: Conjunctivae normal.      Pupils: Pupils are equal, round, and reactive to light.   Neck:      Vascular: No carotid bruit.   Cardiovascular:      Rate and Rhythm: Normal rate and regular rhythm.      Pulses: Normal pulses.      Heart sounds: Normal heart sounds. No murmur heard.  Pulmonary:      Effort: Pulmonary effort is normal. No respiratory distress.      Breath sounds: Normal breath sounds. No wheezing or rales.   Abdominal:      General: Abdomen is flat. Bowel sounds are normal. There is no distension.      Palpations: Abdomen is soft.      Tenderness: There is no abdominal tenderness.   Musculoskeletal:         General: Normal range of motion.      Cervical back: Normal range of motion and neck supple. No tenderness.      Right lower leg: No edema.      Left lower leg: No edema.      Comments: Negative for Straight Leg   Lymphadenopathy:      Cervical: No cervical adenopathy.   Skin:     General: Skin is warm and dry.      Capillary Refill: Capillary refill takes less than 2 seconds.   Neurological:      General: No focal deficit present.      Mental Status: She is alert and oriented to person, place, and time. Mental status is at baseline.      Motor: No weakness.      Gait: Gait normal.   Psychiatric:       "   Mood and Affect: Mood normal.         Behavior: Behavior normal.       Administrative Statements   I have spent a total time of 60 minutes in caring for this patient on the day of the visit/encounter including Diagnostic results, Prognosis, Risks and benefits of tx options, Instructions for management, Patient and family education, Importance of tx compliance, Risk factor reductions, Impressions, Counseling / Coordination of care, Documenting in the medical record, Reviewing / ordering tests, medicine, procedures  , Obtaining or reviewing history  , and Communicating with other healthcare professionals .   Depression Screening Follow-up Plan: Patient's depression screening was positive with a PHQ-2 score of . Their PHQ-9 score was 4. Patient assessed for underlying major depression. They have no active suicidal ideations. Brief counseling provided and recommend additional follow-up/re-evaluation next office visit. Patient's depressive symptoms likely due to other medical condition. Would recommend treatment of underlying condition. Will continue to monitor at next office visit.

## 2024-11-21 NOTE — ASSESSMENT & PLAN NOTE
Patient presenting to the office with PHQ=4  During visit, patient endorses intermittent depressive symptoms and difficulty sleeping but these appear to be in the setting of underlying medical problems as well as social circumstances since patient has recently picked up a second job that requires working nights sometimes  She denied any issues with appetite, concentration, anhedonia or suicidal/homicidal ideations  Given her underlying issues, will attempt to treat patient's medical conditions before considering additional psychotropic medication  Patient will also benefit from use of Wellbutrin given current tobacco use    Plan  Continue to monitor  Will reassess depressive symptoms after treatment of underlying medical conditions

## 2024-11-21 NOTE — ASSESSMENT & PLAN NOTE
Patient is presenting for an annual physical exam  At this time other than acute issue of back pain, patient is well-appearing and in no acute distress    Plan  Routine physical lab work ordered

## 2024-11-21 NOTE — ASSESSMENT & PLAN NOTE
Patient was diagnosed with BCC of Right Cheek on December 2023 on shave biopsy  S/P Mohs Surgery on January 31, 2024  Had regular follow-up with Dermatology for post-surgical wound check and healing  At this time, patient shows appropriate wound healing of the right cheek and denies any recurrence of similar skin lesion or systemic symptoms at this time    Plan  Continue to follow  Patient recommended to follow with Dermatology as needed per previous recommendations for wound check

## 2024-11-21 NOTE — ASSESSMENT & PLAN NOTE
Patient has previous history of Vitamin D Deficiency, asking for refill for Vitamin D supplementation  Will also check Vitamin D level at this time  Orders:    cholecalciferol (VITAMIN D3) 400 units tablet; Take 2 tablets (800 Units total) by mouth daily

## 2024-11-21 NOTE — ASSESSMENT & PLAN NOTE
Patient has intermittent hot flashes she attributed to menopause  Patient reports these are stable but still persisting 2-years following LMP  She is contraindicated for hormonal therapy at this time given current tobacco use and previous malignancy history of BCC    Plan  Continue to monitor  Can consider SSRI or SNRI for symptomatic relief if patient's flashes continue to persist

## 2024-11-21 NOTE — ASSESSMENT & PLAN NOTE
Patient's back pain is consistent with lumbar strain given acute onset and lack of red flag symptoms  Low suspicion this is inflammatory etiology as well given relief of pain with rest  Physical exam is also benign with no focal deficits or tenderness noted on palpation  Patient was advised to begin symptomatic treatment for pain control and avoid similar strenuous activities at work that likely precipitated this pain    Plan  Patient advised to begin pain control with OTC Tylenol and Voltaren Gel  PT/OT referral  Orders:    Ambulatory Referral to Physical Therapy; Future    Ambulatory Referral to Occupational Therapy; Future    Diclofenac Sodium (VOLTAREN) 1 %; Apply 2 g topically 4 (four) times a day

## 2024-11-21 NOTE — ASSESSMENT & PLAN NOTE
Patient agreeable to COVID-19 vaccination today  Orders:    COVID-19 Pfizer mRNA vaccine 12 yr and older (Comirnaty pre-filled syringe)

## 2024-11-21 NOTE — ASSESSMENT & PLAN NOTE
Patient has chronic history of restless leg in the right leg that is not worsened from baseline    Plan  Will get iron panel to rule out concomitant CRISTOFER that can be present in Restless Leg Syndrome  Can consider symptomatic treatment with Gabapentin or Pregabalin if symptoms begin to worsen  Orders:    Iron Panel (Includes Ferritin, Iron Sat%, Iron, and TIBC); Future

## 2024-11-21 NOTE — ASSESSMENT & PLAN NOTE
Patient's routine lab work per annual physical ordered  Patient follows with Gyn and had normal Pap Smear last month with next screening due in 3-5 years per Gyn  Next screening Mammogram scheduled for June 2025  Patient's last colonoscopy in 2020 grew a small tubular adenoma polyp s/p cold forceps removal, with next screening due in 2025  Patient is agreeable for CT Lung Screening given previous tobacco use    Plan  Would confirm with patient next visit if colonoscopy for 2025 is scheduled to see if patient would need gastroenterology referral for scheduling screening colonoscopy  Orders:    CBC and differential; Future    Comprehensive metabolic panel; Future    Hemoglobin A1C; Future    Lipid panel; Future    TSH + Free T4; Future    Vitamin D 25 hydroxy; Future    CT lung screening program; Future

## 2024-11-21 NOTE — ASSESSMENT & PLAN NOTE
Patient has extensive history of tobacco use, with patient endorsing 40+ year pack history of tobacco use with cigarettes  Patient has previously tried Chantrix, Wellbutrin (Though briefly for less than 5 days) as well as Nicoderm  Upon counseling today, patient is agreeable to try Wellbutrin again for smoking cessation given overall risk    Plan  Will prescribe Wellbutrin 150 mg Daily for smoking cessation  Patient will additionally trial Nicotine Lozenge given previous denture history  She is agreeable to schedule CT Lung Screening given last was in 2021  Orders:    buPROPion (WELLBUTRIN XL) 150 mg 24 hr tablet; Take 1 tablet (150 mg total) by mouth every morning    albuterol (Ventolin HFA) 90 mcg/act inhaler; Inhale 2 puffs every 6 (six) hours as needed for wheezing    nicotine polacrilex (COMMIT) 2 MG lozenge; Apply 1 lozenge (2 mg total) to the mouth or throat as needed for smoking cessation

## 2024-11-21 NOTE — ASSESSMENT & PLAN NOTE
Patient agreeable to Flu and Zoster vaccination in office today  Orders:    influenza vaccine, recombinant, PF, 0.5 mL IM (Flublok)    Zoster Vaccine Recombinant IM

## 2024-11-29 ENCOUNTER — HOSPITAL ENCOUNTER (OUTPATIENT)
Dept: RADIOLOGY | Facility: HOSPITAL | Age: 54
Discharge: HOME | End: 2024-11-29

## 2024-11-29 DIAGNOSIS — Z13.9 SCREENING DUE: ICD-10-CM

## 2024-12-04 ENCOUNTER — RESULTS FOLLOW-UP (OUTPATIENT)
Dept: EMERGENCY DEPT | Facility: HOSPITAL | Age: 54
End: 2024-12-04

## 2024-12-04 DIAGNOSIS — R91.8 PULMONARY NODULES: Primary | ICD-10-CM

## 2024-12-13 DIAGNOSIS — F17.219 CIGARETTE NICOTINE DEPENDENCE WITH NICOTINE-INDUCED DISORDER: ICD-10-CM

## 2024-12-13 DIAGNOSIS — E55.9 VITAMIN D DEFICIENCY: ICD-10-CM

## 2024-12-16 RX ORDER — BUPROPION HYDROCHLORIDE 150 MG/1
150 TABLET ORAL EVERY MORNING
Qty: 90 TABLET | Refills: 2 | Status: SHIPPED | OUTPATIENT
Start: 2024-12-16

## 2024-12-16 RX ORDER — CHOLECALCIFEROL (VITAMIN D3) 10 MCG
800 TABLET ORAL DAILY
Qty: 180 TABLET | Refills: 1 | Status: SHIPPED | OUTPATIENT
Start: 2024-12-16

## 2024-12-21 PROBLEM — Z13.9 SCREENING DUE: Status: RESOLVED | Noted: 2024-11-21 | Resolved: 2024-12-21

## 2025-01-10 ENCOUNTER — APPOINTMENT (OUTPATIENT)
Dept: LAB | Facility: CLINIC | Age: 55
End: 2025-01-10
Payer: COMMERCIAL

## 2025-01-10 DIAGNOSIS — Z13.9 SCREENING DUE: ICD-10-CM

## 2025-01-10 DIAGNOSIS — G25.81 RESTLESS LEG SYNDROME: ICD-10-CM

## 2025-01-10 LAB
25(OH)D3 SERPL-MCNC: 32 NG/ML (ref 30–100)
ALBUMIN SERPL BCG-MCNC: 4.4 G/DL (ref 3.5–5)
ALP SERPL-CCNC: 65 U/L (ref 34–104)
ALT SERPL W P-5'-P-CCNC: 10 U/L (ref 7–52)
ANION GAP SERPL CALCULATED.3IONS-SCNC: 9 MMOL/L (ref 4–13)
AST SERPL W P-5'-P-CCNC: 17 U/L (ref 13–39)
BASOPHILS # BLD AUTO: 0.03 THOUSANDS/ΜL (ref 0–0.1)
BASOPHILS NFR BLD AUTO: 1 % (ref 0–1)
BILIRUB SERPL-MCNC: 0.59 MG/DL (ref 0.2–1)
BUN SERPL-MCNC: 15 MG/DL (ref 5–25)
CALCIUM SERPL-MCNC: 9.7 MG/DL (ref 8.4–10.2)
CHLORIDE SERPL-SCNC: 105 MMOL/L (ref 96–108)
CHOLEST SERPL-MCNC: 243 MG/DL (ref ?–200)
CO2 SERPL-SCNC: 29 MMOL/L (ref 21–32)
CREAT SERPL-MCNC: 0.66 MG/DL (ref 0.6–1.3)
EOSINOPHIL # BLD AUTO: 0.08 THOUSAND/ΜL (ref 0–0.61)
EOSINOPHIL NFR BLD AUTO: 1 % (ref 0–6)
ERYTHROCYTE [DISTWIDTH] IN BLOOD BY AUTOMATED COUNT: 13.2 % (ref 11.6–15.1)
EST. AVERAGE GLUCOSE BLD GHB EST-MCNC: 123 MG/DL
FERRITIN SERPL-MCNC: 46 NG/ML (ref 11–307)
GFR SERPL CREATININE-BSD FRML MDRD: 100 ML/MIN/1.73SQ M
GLUCOSE P FAST SERPL-MCNC: 87 MG/DL (ref 65–99)
HBA1C MFR BLD: 5.9 %
HCT VFR BLD AUTO: 37.6 % (ref 34.8–46.1)
HDLC SERPL-MCNC: 60 MG/DL
HGB BLD-MCNC: 11.9 G/DL (ref 11.5–15.4)
IMM GRANULOCYTES # BLD AUTO: 0.02 THOUSAND/UL (ref 0–0.2)
IMM GRANULOCYTES NFR BLD AUTO: 0 % (ref 0–2)
IRON SATN MFR SERPL: 16 % (ref 15–50)
IRON SERPL-MCNC: 53 UG/DL (ref 50–212)
LDLC SERPL CALC-MCNC: 167 MG/DL (ref 0–100)
LYMPHOCYTES # BLD AUTO: 2.37 THOUSANDS/ΜL (ref 0.6–4.47)
LYMPHOCYTES NFR BLD AUTO: 41 % (ref 14–44)
MCH RBC QN AUTO: 28.1 PG (ref 26.8–34.3)
MCHC RBC AUTO-ENTMCNC: 31.6 G/DL (ref 31.4–37.4)
MCV RBC AUTO: 89 FL (ref 82–98)
MONOCYTES # BLD AUTO: 0.37 THOUSAND/ΜL (ref 0.17–1.22)
MONOCYTES NFR BLD AUTO: 7 % (ref 4–12)
NEUTROPHILS # BLD AUTO: 2.85 THOUSANDS/ΜL (ref 1.85–7.62)
NEUTS SEG NFR BLD AUTO: 50 % (ref 43–75)
NONHDLC SERPL-MCNC: 183 MG/DL
NRBC BLD AUTO-RTO: 0 /100 WBCS
PLATELET # BLD AUTO: 292 THOUSANDS/UL (ref 149–390)
PMV BLD AUTO: 10.2 FL (ref 8.9–12.7)
POTASSIUM SERPL-SCNC: 4 MMOL/L (ref 3.5–5.3)
PROT SERPL-MCNC: 7.1 G/DL (ref 6.4–8.4)
RBC # BLD AUTO: 4.24 MILLION/UL (ref 3.81–5.12)
SODIUM SERPL-SCNC: 143 MMOL/L (ref 135–147)
T4 FREE SERPL-MCNC: 0.84 NG/DL (ref 0.61–1.12)
TIBC SERPL-MCNC: 334.6 UG/DL (ref 250–450)
TRANSFERRIN SERPL-MCNC: 239 MG/DL (ref 203–362)
TRIGL SERPL-MCNC: 79 MG/DL (ref ?–150)
TSH SERPL DL<=0.05 MIU/L-ACNC: 1.16 UIU/ML (ref 0.45–4.5)
UIBC SERPL-MCNC: 282 UG/DL (ref 155–355)
WBC # BLD AUTO: 5.72 THOUSAND/UL (ref 4.31–10.16)

## 2025-01-10 PROCEDURE — 83540 ASSAY OF IRON: CPT

## 2025-01-10 PROCEDURE — 80061 LIPID PANEL: CPT

## 2025-01-10 PROCEDURE — 84439 ASSAY OF FREE THYROXINE: CPT

## 2025-01-10 PROCEDURE — 85025 COMPLETE CBC W/AUTO DIFF WBC: CPT

## 2025-01-10 PROCEDURE — 82728 ASSAY OF FERRITIN: CPT

## 2025-01-10 PROCEDURE — 36415 COLL VENOUS BLD VENIPUNCTURE: CPT

## 2025-01-10 PROCEDURE — 83036 HEMOGLOBIN GLYCOSYLATED A1C: CPT

## 2025-01-10 PROCEDURE — 80053 COMPREHEN METABOLIC PANEL: CPT

## 2025-01-10 PROCEDURE — 84443 ASSAY THYROID STIM HORMONE: CPT

## 2025-01-10 PROCEDURE — 83550 IRON BINDING TEST: CPT

## 2025-01-10 PROCEDURE — 82306 VITAMIN D 25 HYDROXY: CPT

## 2025-03-13 ENCOUNTER — OFFICE VISIT (OUTPATIENT)
Dept: INTERNAL MEDICINE CLINIC | Facility: CLINIC | Age: 55
End: 2025-03-13

## 2025-03-13 VITALS
OXYGEN SATURATION: 98 % | HEART RATE: 71 BPM | TEMPERATURE: 98 F | SYSTOLIC BLOOD PRESSURE: 93 MMHG | WEIGHT: 101.8 LBS | DIASTOLIC BLOOD PRESSURE: 62 MMHG | BODY MASS INDEX: 19.23 KG/M2

## 2025-03-13 DIAGNOSIS — Z23 ENCOUNTER FOR IMMUNIZATION: ICD-10-CM

## 2025-03-13 DIAGNOSIS — M79.675 TOE PAIN, LEFT: Primary | ICD-10-CM

## 2025-03-13 DIAGNOSIS — C44.319 BASAL CELL CARCINOMA (BCC) OF RIGHT CHEEK: ICD-10-CM

## 2025-03-13 DIAGNOSIS — G25.81 RESTLESS LEG SYNDROME: ICD-10-CM

## 2025-03-13 DIAGNOSIS — E78.2 MIXED HYPERLIPIDEMIA: ICD-10-CM

## 2025-03-13 DIAGNOSIS — D12.6 TUBULAR ADENOMA OF COLON: ICD-10-CM

## 2025-03-13 DIAGNOSIS — R73.03 PRE-DIABETES: ICD-10-CM

## 2025-03-13 DIAGNOSIS — F17.219 CIGARETTE NICOTINE DEPENDENCE WITH NICOTINE-INDUCED DISORDER: ICD-10-CM

## 2025-03-13 DIAGNOSIS — R92.8 ABNORMAL MAMMOGRAM: ICD-10-CM

## 2025-03-13 PROCEDURE — 90750 HZV VACC RECOMBINANT IM: CPT

## 2025-03-13 PROCEDURE — 99215 OFFICE O/P EST HI 40 MIN: CPT | Performed by: STUDENT IN AN ORGANIZED HEALTH CARE EDUCATION/TRAINING PROGRAM

## 2025-03-13 PROCEDURE — 90471 IMMUNIZATION ADMIN: CPT

## 2025-03-13 RX ORDER — GABAPENTIN 300 MG/1
300 CAPSULE ORAL
Qty: 30 CAPSULE | Refills: 2 | Status: SHIPPED | OUTPATIENT
Start: 2025-03-13

## 2025-03-13 NOTE — ASSESSMENT & PLAN NOTE
Patient with noted 2 mm Tubular Adenoma on Colonoscopy in 2020  Would recommend 5-10 year follow-up for further screening  Patient agreeable to GI referral for 5-year colonoscopy screening    Plan  GI referral for Colonoscopy screening  Orders:    Ambulatory Referral to Gastroenterology; Future

## 2025-03-13 NOTE — ASSESSMENT & PLAN NOTE
Patient reports symptoms at this time are still persistent with her right leg feeling heavy at night  No obvious deformities or findings noted on physical exam  Given persistent of symptoms, will start medical management with Gabapentin    Plan  Start Gabapentin 300 mg QHS  PT Referral placed given she expressed desire to follow with them  Will monitor Iron panel given results are low-normal and may need further supplementation if symptoms persist  Orders:    gabapentin (Neurontin) 300 mg capsule; Take 1 capsule (300 mg total) by mouth daily at bedtime    Ambulatory Referral to Physical Therapy; Future

## 2025-03-13 NOTE — ASSESSMENT & PLAN NOTE
Patient was diagnosed with BCC of Right Cheek on December 2023 on shave biopsy  S/P Mohs Surgery on January 31, 2024  At this time, patient shows appropriate wound healing of the right cheek and denies any recurrence of similar skin lesion or systemic symptoms at this time    Plan  Will continue to monitor  Patient advised to follow with Dermatology annually

## 2025-03-13 NOTE — ASSESSMENT & PLAN NOTE
Lab Results   Component Value Date    CHOLESTEROL 243 (H) 01/10/2025    CHOLESTEROL 251 (H) 11/17/2023    CHOLESTEROL 207 (H) 07/25/2020     Lab Results   Component Value Date    HDL 60 01/10/2025    HDL 50 11/17/2023    HDL 55 07/25/2020     Lab Results   Component Value Date    TRIG 79 01/10/2025    TRIG 101 11/17/2023    TRIG 105 07/25/2020     Lab Results   Component Value Date    NONHDLC 183 01/10/2025    NONHDLC 201 11/17/2023    NONHDLC 152 07/25/2020    Patient has chronic hypercholesteremia with elevated LDL though ASCVD<5%  Discussed with patient about results and she is agreeable to monitor off medication and try lifestyle management    Plan  Lifestyle management with diet and exercise  Recommend annual check of lipid panel

## 2025-03-13 NOTE — ASSESSMENT & PLAN NOTE
Patient's toe pain at this time is infrequent with its recurence occurring only once in the last year for less than one minute  Differential includes spastic cramp, sciatica, gout, intermittent claudication or progression of Restless Leg Syndrome  Does not meet criteria for further radiograph imaging per [place] Foot Scale at this time  Patient advised to begin over the counter pain control and monitor ambulation    Plan  Will start Gabapentin and Voltaren Gel  Physical Therapy Referral  Orders:    Diclofenac Sodium (VOLTAREN) 1 %; Apply 2 g topically 4 (four) times a day    gabapentin (Neurontin) 300 mg capsule; Take 1 capsule (300 mg total) by mouth daily at bedtime    Ambulatory Referral to Physical Therapy; Future

## 2025-03-13 NOTE — ASSESSMENT & PLAN NOTE
Patient endorses reduced frequency of tobacco use on wellbutrin but reports intermittent use despite compliance with medication  She reports not being compliant with nicotine replacement therapy with lozenges  Discussed with patient that she still has a desire to quit and would likely have greater chance of success with concurrent medical management with nicotine replacement as opposed to monotherapy, which she is agreeable to    Plan  Patient to continue Wellbutrin given tolerating medication well  Advised to utilize Nicotine Lozenge as needed in the morning  Patient advised to follow-up with 6-month CT Lung Screening for previous nodules noted, to be due May 2025

## 2025-03-13 NOTE — PROGRESS NOTES
Name: Yazmin Mooney      : 1970      MRN: [id number]  Encounter Provider: Kelechi Wells MD  Encounter Date: 3/13/2025   Encounter department: [facility] [place]  :  Assessment & Plan  Tubular adenoma of colon  Patient with noted 2 mm Tubular Adenoma on Colonoscopy in   Would recommend 5-10 year follow-up for further screening  Patient agreeable to GI referral for 5-year colonoscopy screening    Plan  GI referral for Colonoscopy screening  Orders:    Ambulatory Referral to Gastroenterology; Future    Cigarette nicotine dependence with nicotine-induced disorder  Patient endorses reduced frequency of tobacco use on wellbutrin but reports intermittent use despite compliance with medication  She reports not being compliant with nicotine replacement therapy with lozenges  Discussed with patient that she still has a desire to quit and would likely have greater chance of success with concurrent medical management with nicotine replacement as opposed to monotherapy, which she is agreeable to    Plan  Patient to continue Wellbutrin given tolerating medication well  Advised to utilize Nicotine Lozenge as needed in the morning  Patient advised to follow-up with 6-month CT Lung Screening for previous nodules noted, to be due May 2025       Basal cell carcinoma (BCC) of right cheek  Patient was diagnosed with BCC of Right Cheek on 2023 on shave biopsy  S/P Mohs Surgery on 2024  At this time, patient shows appropriate wound healing of the right cheek and denies any recurrence of similar skin lesion or systemic symptoms at this time    Plan  Will continue to monitor  Patient advised to follow with Dermatology annually       Abnormal mammogram  Patient has next mammogram scheduled for 2025       Encounter for immunization    Orders:    Zoster Vaccine Recombinant IM    Restless leg syndrome  Patient reports symptoms at this time are still persistent with her right leg  feeling heavy at night  No obvious deformities or findings noted on physical exam  Given persistent of symptoms, will start medical management with Gabapentin    Plan  Start Gabapentin 300 mg QHS  PT Referral placed given she expressed desire to follow with them  Will monitor Iron panel given results are low-normal and may need further supplementation if symptoms persist  Orders:    gabapentin (Neurontin) 300 mg capsule; Take 1 capsule (300 mg total) by mouth daily at bedtime    Ambulatory Referral to Physical Therapy; Future    Toe pain, left  Patient's toe pain at this time is infrequent with its recurence occurring only once in the last year for less than one minute  Differential includes spastic cramp, sciatica, gout, intermittent claudication or progression of Restless Leg Syndrome  Does not meet criteria for further radiograph imaging per [place] Foot Scale at this time  Patient advised to begin over the counter pain control and monitor ambulation    Plan  Will start Gabapentin and Voltaren Gel  Physical Therapy Referral  Orders:    Diclofenac Sodium (VOLTAREN) 1 %; Apply 2 g topically 4 (four) times a day    gabapentin (Neurontin) 300 mg capsule; Take 1 capsule (300 mg total) by mouth daily at bedtime    Ambulatory Referral to Physical Therapy; Future    Mixed hyperlipidemia  Lab Results   Component Value Date    CHOLESTEROL 243 (H) 01/10/2025    CHOLESTEROL 251 (H) 11/17/2023    CHOLESTEROL 207 (H) 07/25/2020     Lab Results   Component Value Date    HDL 60 01/10/2025    HDL 50 11/17/2023    HDL 55 07/25/2020     Lab Results   Component Value Date    TRIG 79 01/10/2025    TRIG 101 11/17/2023    TRIG 105 07/25/2020     Lab Results   Component Value Date    NONHDLC 183 01/10/2025    NONHDLC 201 11/17/2023    NONHDLC 152 07/25/2020    Patient has chronic hypercholesteremia with elevated LDL though ASCVD<5%  Discussed with patient about results and she is agreeable to monitor off medication and try lifestyle  management    Plan  Lifestyle management with diet and exercise  Recommend annual check of lipid panel       Pre-diabetes  Lab Results   Component Value Date    HGBA1C 5.9 (H) 01/10/2025      Patient is pre-diabetic at this time  Has adequate exercise with walking so discussed with patient importance of diet control    Plan  Lifestyle Management discussed  Continue to Monitor Yearly with A1C             Lung Cancer Screening Shared Decision Making: I discussed with her that she is a candidate for lung cancer CT screening.     The following Shared Decision-Making points were covered:  Benefits of screening were discussed, including the rates of reduction in death from lung cancer and other causes.  Harms of screening were reviewed, including false positive tests, radiation exposure levels, risks of invasive procedures, risks of complications of screening, and risk of overdiagnosis.  I counseled on the importance of adherence to annual lung cancer LDCT screening, impact of co-morbidities, and ability or willingness to undergo diagnosis and treatment.  I counseled on the importance of maintaining abstinence as a former smoker or was counseled on the importance of smoking cessation if a current smoker    Review of Eligibility Criteria: She meets all of the criteria for Lung Cancer Screening.   - She is 55 y.o.   - She has 20 pack year tobacco history and is a current smoker or has quit within the past 15 years  - She presents no signs or symptoms of lung cancer    After discussion, the patient decided to elect lung cancer screening.      History of Present Illness   Patient is a 55-year-old female presenting to the office today for evaluation of left toe pain. She reports a 1-year history of left recurrent toe pain. She says that last incident of toe pain was in April 2024 when she had a sudden sensation of toe pain while sleeping that caused her toes to curl. She says the pain was sharp and lasted less than one minute  before spontaneously resolving. Since then she reports no recurrence of this pain but reports she will get intermittent mild pain in her left toes when ambulating that radiates from her foot to her thigh but will resolve on rest. She says this pain does not interfere overall with her ambulation at work. She denies any fevers, chills, chest pain, shortness of breath, abdominal pain, nausea/vomiting, or rashes.      Review of Systems   Constitutional:  Negative for chills and fever.   HENT:  Negative for ear pain and sore throat.    Eyes:  Negative for pain and visual disturbance.   Respiratory:  Negative for cough and shortness of breath.    Cardiovascular:  Negative for chest pain and palpitations.   Gastrointestinal:  Negative for abdominal pain and vomiting.   Genitourinary:  Negative for dysuria and hematuria.   Musculoskeletal:  Negative for arthralgias and back pain.   Skin:  Negative for color change and rash.   Neurological:  Negative for seizures and syncope.   All other systems reviewed and are negative.      Objective   BP 93/62 (BP Location: Left arm, Patient Position: Sitting, Cuff Size: Standard)   Pulse 71   Temp 98 °F (36.7 °C) (Temporal)   Wt 46.2 kg (101 lb 12.8 oz)   LMP  (LMP Unknown)   SpO2 98%   BMI 19.23 kg/m²      Physical Exam  Vitals and nursing note reviewed.   Constitutional:       General: She is not in acute distress.     Appearance: She is well-developed.   HENT:      Head: Normocephalic and atraumatic.   Eyes:      Conjunctiva/sclera: Conjunctivae normal.   Cardiovascular:      Rate and Rhythm: Normal rate and regular rhythm.      Heart sounds: No murmur heard.  Pulmonary:      Effort: Pulmonary effort is normal. No respiratory distress.      Breath sounds: Normal breath sounds.   Abdominal:      Palpations: Abdomen is soft.      Tenderness: There is no abdominal tenderness.   Musculoskeletal:         General: No swelling.      Cervical back: Neck supple.   Skin:     General:  Skin is warm and dry.      Capillary Refill: Capillary refill takes less than 2 seconds.   Neurological:      Mental Status: She is alert.   Psychiatric:         Mood and Affect: Mood normal.

## 2025-04-04 ENCOUNTER — TELEPHONE (OUTPATIENT)
Dept: GASTROENTEROLOGY | Facility: CLINIC | Age: 55
End: 2025-04-04

## 2025-04-04 NOTE — TELEPHONE ENCOUNTER
04/04/25  Screened by: Karyn Jones    Referring Provider Geovani    Pre- Screening:     There is no height or weight on file to calculate BMI.  100 lb  5'1  Has patient been referred for a routine screening Colonoscopy? yes  Is the patient between 45-75 years old? yes      Previous Colonoscopy yes   If yes:    Date: 8/4/2020    Facility: [facility]    Reason: Screening Colon      SCHEDULING STAFF: If the patient is between 45yrs-49yrs, please advise patient to confirm benefits/coverage with their insurance company for a routine screening colonoscopy, some insurance carriers will only cover at 50yrs or older. If the patient is over 75years old, please schedule an office visit.     Does the patient want to see a Gastroenterologist prior to their procedure OR are they having any GI symptoms? no    Has the patient been hospitalized or had abdominal surgery in the past 6 months? no    Does the patient use supplemental oxygen? no    Does the patient take Coumadin, Lovenox, Plavix, Elliquis, Xarelto, or other blood thinning medication? no    Has the patient had a stroke, cardiac event, or stent placed in the past year? no    SCHEDULING STAFF: If patient answers NO to above questions, then schedule procedure. If patient answers YES to above questions, then schedule office appointment.     If patient is between 45yrs - 49yrs, please advise patient that we will have to confirm benefits & coverage with their insurance company for a routine screening colonoscopy.

## 2025-04-04 NOTE — TELEPHONE ENCOUNTER
Scheduled date of colonoscopy (as of today): 5/1/25  Physician performing colonoscopy: Dr. Alberto  Location of colonoscopy: [facility]  Bowel prep reviewed with patient: Val/Jason  Instructions reviewed with patient by: Karyn  Clearances: N/A

## 2025-04-17 ENCOUNTER — ANESTHESIA EVENT (OUTPATIENT)
Dept: ANESTHESIOLOGY | Facility: HOSPITAL | Age: 55
End: 2025-04-17

## 2025-04-17 ENCOUNTER — ANESTHESIA (OUTPATIENT)
Dept: ANESTHESIOLOGY | Facility: HOSPITAL | Age: 55
End: 2025-04-17

## 2025-05-01 ENCOUNTER — HOSPITAL ENCOUNTER (OUTPATIENT)
Dept: GASTROENTEROLOGY | Facility: AMBULARY SURGERY CENTER | Age: 55
Setting detail: OUTPATIENT SURGERY
End: 2025-05-01
Attending: INTERNAL MEDICINE
Payer: COMMERCIAL

## 2025-05-01 ENCOUNTER — ANESTHESIA (OUTPATIENT)
Dept: GASTROENTEROLOGY | Facility: AMBULARY SURGERY CENTER | Age: 55
End: 2025-05-01
Payer: COMMERCIAL

## 2025-05-01 VITALS
HEART RATE: 82 BPM | DIASTOLIC BLOOD PRESSURE: 64 MMHG | SYSTOLIC BLOOD PRESSURE: 120 MMHG | TEMPERATURE: 97 F | OXYGEN SATURATION: 99 % | RESPIRATION RATE: 22 BRPM

## 2025-05-01 DIAGNOSIS — Z86.0100 HISTORY OF COLON POLYPS: ICD-10-CM

## 2025-05-01 PROCEDURE — 45385 COLONOSCOPY W/LESION REMOVAL: CPT | Performed by: INTERNAL MEDICINE

## 2025-05-01 PROCEDURE — 88305 TISSUE EXAM BY PATHOLOGIST: CPT | Performed by: STUDENT IN AN ORGANIZED HEALTH CARE EDUCATION/TRAINING PROGRAM

## 2025-05-01 RX ORDER — PROPOFOL 10 MG/ML
INJECTION, EMULSION INTRAVENOUS AS NEEDED
Status: DISCONTINUED | OUTPATIENT
Start: 2025-05-01 | End: 2025-05-01

## 2025-05-01 RX ORDER — KETOROLAC TROMETHAMINE 30 MG/ML
INJECTION, SOLUTION INTRAMUSCULAR; INTRAVENOUS AS NEEDED
Status: DISCONTINUED | OUTPATIENT
Start: 2025-05-01 | End: 2025-05-01

## 2025-05-01 RX ORDER — SODIUM CHLORIDE, SODIUM LACTATE, POTASSIUM CHLORIDE, CALCIUM CHLORIDE 600; 310; 30; 20 MG/100ML; MG/100ML; MG/100ML; MG/100ML
INJECTION, SOLUTION INTRAVENOUS CONTINUOUS PRN
Status: DISCONTINUED | OUTPATIENT
Start: 2025-05-01 | End: 2025-05-01

## 2025-05-01 RX ORDER — METOCLOPRAMIDE HYDROCHLORIDE 5 MG/ML
INJECTION INTRAMUSCULAR; INTRAVENOUS AS NEEDED
Status: DISCONTINUED | OUTPATIENT
Start: 2025-05-01 | End: 2025-05-01

## 2025-05-01 RX ORDER — PROPOFOL 10 MG/ML
INJECTION, EMULSION INTRAVENOUS CONTINUOUS PRN
Status: DISCONTINUED | OUTPATIENT
Start: 2025-05-01 | End: 2025-05-01

## 2025-05-01 RX ORDER — LIDOCAINE HYDROCHLORIDE 10 MG/ML
INJECTION, SOLUTION EPIDURAL; INFILTRATION; INTRACAUDAL; PERINEURAL AS NEEDED
Status: DISCONTINUED | OUTPATIENT
Start: 2025-05-01 | End: 2025-05-01

## 2025-05-01 RX ADMIN — PROPOFOL 100 MG: 10 INJECTION, EMULSION INTRAVENOUS at 09:52

## 2025-05-01 RX ADMIN — LIDOCAINE HYDROCHLORIDE 50 MG: 10 INJECTION, SOLUTION EPIDURAL; INFILTRATION; INTRACAUDAL at 09:52

## 2025-05-01 RX ADMIN — KETOROLAC TROMETHAMINE 30 MG: 30 INJECTION, SOLUTION INTRAMUSCULAR; INTRAVENOUS at 09:52

## 2025-05-01 RX ADMIN — SODIUM CHLORIDE, SODIUM LACTATE, POTASSIUM CHLORIDE, AND CALCIUM CHLORIDE: .6; .31; .03; .02 INJECTION, SOLUTION INTRAVENOUS at 09:26

## 2025-05-01 RX ADMIN — METOCLOPRAMIDE 5 MG: 5 INJECTION, SOLUTION INTRAMUSCULAR; INTRAVENOUS at 10:20

## 2025-05-01 RX ADMIN — PROPOFOL 125 MCG/KG/MIN: 10 INJECTION, EMULSION INTRAVENOUS at 09:52

## 2025-05-01 NOTE — ANESTHESIA POSTPROCEDURE EVALUATION
Post-Op Assessment Note    CV Status:  Stable  Pain Score: 0    Pain management: adequate       Mental Status:  Sleepy   Hydration Status:  Euvolemic   PONV Controlled:  Controlled   Airway Patency:  Patent     Post Op Vitals Reviewed: Yes    No anethesia notable event occurred.    Staff: CRNA   Comments: vss sv nonobstructed uneventful          Last Filed PACU Vitals:  Vitals Value Taken Time   Temp 97 °F (36.1 °C) 05/01/25 1027   Pulse 79 05/01/25 1027   BP 91/58 05/01/25 1027   Resp 16 05/01/25 1027   SpO2 97 % 05/01/25 1027       Modified Von:     Vitals Value Taken Time   Activity 2 05/01/25 1027   Respiration 2 05/01/25 1027   Circulation 2 05/01/25 1027   Consciousness 1 05/01/25 1027   Oxygen Saturation 2 05/01/25 1027     Modified Von Score: 9

## 2025-05-01 NOTE — H&P
"History and Physical -  [facility]  Yazmin Mooney 55 y.o. female MRN: [id number]    HPI: aYzmin Mooney is a 55 y.o. year old female who presents for screening colonoscopy. Last colonoscopy  w 1 sigmoid polyp.    Last Hg   Lab Results   Component Value Date    HGB 11.9 01/10/2025     Last INR No results found for: \"INR\"  Last Platelets   Lab Results   Component Value Date     01/10/2025       Review of Systems    Historical Information   Past Medical History:   Diagnosis Date    Abnormal Pap smear of cervix     HPV and PAP smear 2020 normal     Anxiety     Anxiety and depression     no longer on meds    Basal cell carcinoma 2023    Right Cheek; MOHS    Breast lump     hx of right breast lump in lumpectomy in     Depression     Migraine     Nicotine dependence     currently smoker, 33 pack year hx     Past Surgical History:   Procedure Laterality Date    BREAST EXCISIONAL BIOPSY Right     excisional- done in [place]    BREAST LUMPECTOMY Right     benign     SECTION      x's 4    COLONOSCOPY  2020    DILATION AND CURETTAGE, DIAGNOSTIC / THERAPEUTIC      pre cancerous cells?    MAMMO (HISTORICAL)  2018    MOHS SURGERY Right 2024    BCC Right Cheek; Dr. Muller    MULTIPLE TOOTH EXTRACTIONS      X2    TUBAL LIGATION      US GUIDED BREAST BIOPSY LEFT COMPLETE Left 12/15/2022     Social History   Social History     Substance and Sexual Activity   Alcohol Use Never     Social History     Substance and Sexual Activity   Drug Use Never     Social History     Tobacco Use   Smoking Status Every Day    Current packs/day: 0.50    Average packs/day: 0.5 packs/day for 33.0 years (16.5 ttl pk-yrs)    Types: Cigarettes   Smokeless Tobacco Never     Family History   Problem Relation Age of Onset    Hypertension Mother     Aneurysm Mother     Migraines Mother     No Known Problems Father     No Known Problems Sister     Stroke Brother     No Known Problems " Brother     No Known Problems Maternal Grandmother     No Known Problems Maternal Grandfather     No Known Problems Paternal Grandmother     No Known Problems Paternal Grandfather     No Known Problems Daughter     No Known Problems Son     No Known Problems Son     No Known Problems Son     No Known Problems Maternal Aunt        Meds/Allergies     Not in a hospital admission.    No Known Allergies    Objective     /71   Pulse 82   Temp (!) 96.7 °F (35.9 °C) (Temporal)   Resp 16   LMP  (LMP Unknown)   SpO2 100%     PRIOR GI PROCEDURES  08/04/20    Colonoscopy    Impression:  Sigmoid polyp less than 5 mm.    RECOMMENDATION:  Await pathology. Preliminary recommendation await pathology report    Solo Olivo MD      PHYSICAL EXAM    Gen: NAD  CV: RRR  CHEST: Clear  ABD: soft, NT/ND  EXT: no edema  Neuro: AAO    ASSESSMENT/PLAN:  This is a 55 y.o. year old female here for colonoscopy    Plan/Procedure: colonoscopy

## 2025-05-01 NOTE — ANESTHESIA PREPROCEDURE EVALUATION
Procedure:  COLONOSCOPY    Relevant Problems   CARDIO   (+) Migraine   (+) Mixed hyperlipidemia      MUSCULOSKELETAL   (+) Acute bilateral low back pain      NEURO/PSYCH   (+) Anxiety   (+) Depression   (+) Migraine        Physical Exam    Airway    Mallampati score: I  TM Distance: >3 FB  Neck ROM: full     Dental       Cardiovascular  Cardiovascular exam normal    Pulmonary  Pulmonary exam normal     Other Findings  post-pubertal.      Anesthesia Plan  ASA Score- 2     Anesthesia Type- IV sedation with anesthesia with ASA Monitors.         Additional Monitors:     Airway Plan:            Plan Factors-Exercise tolerance (METS): >4 METS.    Chart reviewed. EKG reviewed. Imaging results reviewed. Existing labs reviewed. Patient summary reviewed.                  Induction- intravenous.    Postoperative Plan-         Informed Consent- Anesthetic plan and risks discussed with patient.  I personally reviewed this patient with the CRNA. Discussed and agreed on the Anesthesia Plan with the CRNA..      NPO Status:  No vitals data found for the desired time range.

## 2025-05-05 PROCEDURE — 88305 TISSUE EXAM BY PATHOLOGIST: CPT | Performed by: STUDENT IN AN ORGANIZED HEALTH CARE EDUCATION/TRAINING PROGRAM

## 2025-05-14 NOTE — TELEPHONE ENCOUNTER

## 2025-05-28 ENCOUNTER — RESULTS FOLLOW-UP (OUTPATIENT)
Dept: GASTROENTEROLOGY | Facility: CLINIC | Age: 55
End: 2025-05-28

## 2025-06-12 ENCOUNTER — HOSPITAL ENCOUNTER (OUTPATIENT)
Dept: RADIOLOGY | Age: 55
Discharge: HOME | End: 2025-06-12
Payer: COMMERCIAL

## 2025-06-12 DIAGNOSIS — Z12.31 VISIT FOR SCREENING MAMMOGRAM: ICD-10-CM

## 2025-06-12 PROCEDURE — 77067 SCR MAMMO BI INCL CAD: CPT

## 2025-06-12 PROCEDURE — 77063 BREAST TOMOSYNTHESIS BI: CPT

## 2025-06-18 DIAGNOSIS — E55.9 VITAMIN D DEFICIENCY: ICD-10-CM

## 2025-06-18 RX ORDER — OMEGA-3S/DHA/EPA/FISH OIL/D3 300MG-1000
800 CAPSULE ORAL DAILY
Qty: 180 TABLET | Refills: 1 | Status: SHIPPED | OUTPATIENT
Start: 2025-06-18